# Patient Record
Sex: MALE | Race: WHITE | Employment: OTHER | URBAN - METROPOLITAN AREA
[De-identification: names, ages, dates, MRNs, and addresses within clinical notes are randomized per-mention and may not be internally consistent; named-entity substitution may affect disease eponyms.]

---

## 2017-03-09 ENCOUNTER — TELEPHONE (OUTPATIENT)
Dept: FAMILY MEDICINE | Facility: CLINIC | Age: 52
End: 2017-03-09

## 2017-03-09 ENCOUNTER — OFFICE VISIT (OUTPATIENT)
Dept: URGENT CARE | Facility: URGENT CARE | Age: 52
End: 2017-03-09
Payer: COMMERCIAL

## 2017-03-09 VITALS
TEMPERATURE: 99.7 F | WEIGHT: 194 LBS | HEART RATE: 120 BPM | BODY MASS INDEX: 26.87 KG/M2 | OXYGEN SATURATION: 97 % | SYSTOLIC BLOOD PRESSURE: 115 MMHG | DIASTOLIC BLOOD PRESSURE: 78 MMHG

## 2017-03-09 DIAGNOSIS — J11.1 INFLUENZA: Primary | ICD-10-CM

## 2017-03-09 PROCEDURE — 99213 OFFICE O/P EST LOW 20 MIN: CPT | Performed by: FAMILY MEDICINE

## 2017-03-09 NOTE — LETTER
[facility]       [address]  [place] [address]      March 9, 2017      Marlon Mackey  [address]  [facility] [place] [address]          Dear Marlon Mackey,      At [facility] we care about your health and are committed to providing quality patient care, which includes staying current on preventative cancer screenings.  You can increase your chances of finding and treating cancers through regular screenings.      Our records show that you are due for the following screening(s):    Colonoscopy for colon cancer  [facility] [phone]  Recommended every ten years for everyone age 50 and older  We strongly urge our patient's to consider having a colonoscopy done, which is the best screening test available and only needs to be done every 10 years if normal.      Other option is that you can do a FIT and this is once a year.  Any questions or concern, please contact us at [phone].    You may contact the closest location to schedule the screening test(s) at your earliest convenience.    If you have already had one or all of the above screening tests at another facility, please call us so that we may update your chart.      Sincerely,         Frank Childs MD/ ROS  [facility]
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
## 2017-03-09 NOTE — NURSING NOTE
"Chief Complaint   Patient presents with     Cough     Pt c/o Sore throat, cough (productive), headaches, low-grade fever, sweats/chills, loss of appetite, vomiting, abdominal pain, diarrhea.        Initial /78 (BP Location: Left arm, Patient Position: Chair, Cuff Size: Adult Large)  Pulse 120  Temp 99.7  F (37.6  C) (Oral)  Wt 194 lb (88 kg)  SpO2 97%  BMI 26.87 kg/m2 Estimated body mass index is 26.87 kg/(m^2) as calculated from the following:    Height as of 12/20/16: 5' 11.25\" (1.81 m).    Weight as of this encounter: 194 lb (88 kg).  Medication Reconciliation: complete     Cristina Gaming CMA (AAMA)      "

## 2017-03-09 NOTE — MR AVS SNAPSHOT
"              After Visit Summary   3/9/2017    Marlon Mackey    MRN: [id number]           Patient Information     Date Of Birth          1965        Visit Information        Provider Department      3/9/2017 11:15 AM Steve Larsen MD [facility]        Today's Diagnoses     Influenza    -  1       Follow-ups after your visit        Who to contact     If you have questions or need follow up information about today's clinic visit or your schedule please contact [facility] directly at [phone].  Normal or non-critical lab and imaging results will be communicated to you by flux - neutrinityhart, letter or phone within 4 business days after the clinic has received the results. If you do not hear from us within 7 days, please contact the clinic through Upsidet or phone. If you have a critical or abnormal lab result, we will notify you by phone as soon as possible.  Submit refill requests through Imagiin. or call your pharmacy and they will forward the refill request to us. Please allow 3 business days for your refill to be completed.          Additional Information About Your Visit        MyChart Information     [employer] lets you send messages to your doctor, view your test results, renew your prescriptions, schedule appointments and more. To sign up, go to [email or web address] . Click on \"Log in\" on the left side of the screen, which will take you to the Welcome page. Then click on \"Sign up Now\" on the right side of the page.     You will be asked to enter the access code listed below, as well as some personal information. Please follow the directions to create your username and password.     Your access code is: [email or web address]  Expires: 3/20/2017  2:48 PM     Your access code will  in 90 days. If you need help or a new code, please call your [facility] or [phone].        Care EveryWhere ID     This is your Care EveryWhere ID. This could be used by other " organizations to access your [facility] medical records  [id number]        Your Vitals Were     Pulse Temperature Pulse Oximetry BMI (Body Mass Index)          120 99.7  F (37.6  C) (Oral) 97% 26.87 kg/m2         Blood Pressure from Last 3 Encounters:   03/09/17 115/78   12/20/16 135/80   03/30/16 133/87    Weight from Last 3 Encounters:   03/09/17 194 lb (88 kg)   12/20/16 196 lb 2 oz (89 kg)   03/30/16 196 lb (88.9 kg)              Today, you had the following     No orders found for display       Primary Care Provider Office Phone # Fax #    Frank Childs [phone]       [facility] [address]  [facility] [address]        Thank you!     Thank you for choosing [facility]  for your care. Our goal is always to provide you with excellent care. Hearing back from our patients is one way we can continue to improve our services. Please take a few minutes to complete the written survey that you may receive in the mail after your visit with us. Thank you!             Your Updated Medication List - Protect others around you: Learn how to safely use, store and throw away your medicines at www.disposemymeds.org.          This list is accurate as of: 3/9/17  8:53 PM.  Always use your most recent med list.                   Brand Name Dispense Instructions for use    cetirizine 10 MG tablet    zyrTEC    30 tablet    Take 1 tablet (10 mg) by mouth At Bedtime       gabapentin 100 MG capsule    NEURONTIN     Take by mouth daily       methocarbamol 750 MG tablet    methocarbamol    30 tablet    Take 1 tablet (750 mg) by mouth 3 times daily as needed for muscle spasms       prazosin 1 MG capsule    MINIPRESS     Take by mouth daily       QUEtiapine 50 MG tablet    SEROquel     Take by mouth daily       venlafaxine 37.5 MG 24 hr capsule    EFFEXOR-XR     Take by mouth daily

## 2017-03-16 ENCOUNTER — OFFICE VISIT (OUTPATIENT)
Dept: FAMILY MEDICINE | Facility: CLINIC | Age: 52
End: 2017-03-16
Payer: COMMERCIAL

## 2017-03-16 VITALS
BODY MASS INDEX: 27.16 KG/M2 | HEIGHT: 71 IN | SYSTOLIC BLOOD PRESSURE: 115 MMHG | DIASTOLIC BLOOD PRESSURE: 76 MMHG | TEMPERATURE: 98.3 F | HEART RATE: 82 BPM | WEIGHT: 194 LBS | OXYGEN SATURATION: 96 %

## 2017-03-16 DIAGNOSIS — H61.23 BILATERAL IMPACTED CERUMEN: Primary | ICD-10-CM

## 2017-03-16 DIAGNOSIS — R68.89 FLU-LIKE SYMPTOMS: ICD-10-CM

## 2017-03-16 PROCEDURE — 99213 OFFICE O/P EST LOW 20 MIN: CPT | Performed by: PHYSICIAN ASSISTANT

## 2017-03-16 RX ORDER — GUAIFENESIN AND DEXTROMETHORPHAN HYDROBROMIDE 1200; 60 MG/1; MG/1
1 TABLET, EXTENDED RELEASE ORAL 2 TIMES DAILY
Qty: 28 TABLET | Refills: 1 | Status: SHIPPED | OUTPATIENT
Start: 2017-03-16 | End: 2018-03-06

## 2017-03-16 RX ORDER — CODEINE PHOSPHATE AND GUAIFENESIN 10; 100 MG/5ML; MG/5ML
2 SOLUTION ORAL AT BEDTIME
Qty: 236 ML | Refills: 0 | Status: SHIPPED | OUTPATIENT
Start: 2017-03-16 | End: 2018-03-06

## 2017-03-16 RX ORDER — DOXAZOSIN 1 MG/1
TABLET ORAL
COMMUNITY
Start: 2017-03-10 | End: 2018-03-22

## 2017-03-16 NOTE — PATIENT INSTRUCTIONS
Mucinex DM 1 tablet twice a day   Also may take Dayquil for cough and congestion during the day  Prescription cough syrup 10 ml at bedtime, may repeat every 4 hours throughout the night as needed     See ENT to remove ear wax  Influenza  Influenza ( the flu ) is an infection that affects your respiratory tract (the mouth, nose, and lungs, and the passages between them). Unlike a cold, the flu can make you very ill. And it can lead to pneumonia, a serious lung infection. For some people, especially older adults, young children, and people with certain chronic conditions, the flu can have serious complications and even be fatal.  What Are the Risk Factors for the Flu?     Viruses that cause influenza spread through the air in droplets when someone who has the flu coughs, sneezes, laughs, or talks.   Anyone can get the flu. But you re more likely to become infected if you:    Have a weakened immune system.    Work in a health care setting where you may be exposed to flu germs.    Live or work with someone who has the flu.    Haven t received an annual flu shot.  How Does the Flu Spread?  The flu is caused by viruses. The viruses spread through the air in droplets when someone who has the flu coughs, sneezes, laughs, or talks. You can become infected when you inhale these viruses directly. You can also become infected when you touch a surface on which the droplets have landed and then transfer the germs to your eyes, nose, or mouth. Touching used tissues, or sharing utensils, drinking glasses, or a toothbrush with an infected person can expose you to flu viruses, too.  What Are the Symptoms of the Flu?  Flu symptoms tend to come on quickly and may last a few days to a few weeks. They include:    Fever usually higher than 101 F  (38.3 C) and chills    Sore throat and headache    Dry cough    Runny nose    Tiredness and weakness    Muscle aches  Factors That Can Make Flu Worse  For some people, the flu can be very  serious. The risk of complications is greater for:    Children under age 5.    Adults 65 years of age and older.    People with a chronic illness, such as diabetes or heart, kidney, or lung disease.    People who live in a nursing home or long-term care facility.   How Is the Flu Treated?  Influenza usually improves after 7 days or so. In some cases, your health care provider may prescribe an antiviral medication. This may help you get well sooner. For the medication to help, you need to take it as soon as possible (ideally within 48 hours) after your symptoms start. If you develop pneumonia or other serious illness, hospital care may be needed.  Easing Flu Symptoms    Drink lots of fluids such as water, juice, and warm soup. A good rule is to drink enough so that you urinate your normal amount.    Get plenty of rest.    Ask your health care provider what to take for fever and pain.    Call your provider if your fever rises over 101 F (38.3 C) or you become dizzy, lightheaded, or short of breath.  Taking Steps to Protect Others    Wash your hands often, especially after coughing or sneezing. Or, clean your hands with an alcohol-based hand  containing at least 60 percent alcohol.    Cough or sneeze into a tissue. Then throw the tissue away and wash your hands. If you don t have a tissue, cough and sneeze into the crook of your elbow.    Stay home until at least 24 hours after you no longer have a fever or chills. Be sure the fever isn t being hidden by fever-reducing medication.    Don t share food, utensils, drinking glasses, or a toothbrush with others.    Ask your health care provider if others in your household should receive antiviral medication to help them avoid infection.  How Can the Flu Be Prevented?    One of the best ways to avoid the flu is to get a flu vaccination each year. Viruses that cause the flu change from year to year. For that reason, doctors recommend getting the flu vaccine each year,  as soon as it's available in your area. The vaccine may be given as a shot or as a nasal spray. Your health care provider can tell you which vaccine is right for you.    Wash your hands often. Frequent handwashing is a proven way to help prevent infection.    Carry an alcohol-based hand gel containing at least 60 percent alcohol. Use it when you don t have access to soap and water. Then wash your hands as soon as you can.    Avoid touching your eyes, nose, and mouth.    At home and work, clean phones, computer keyboards, and toys often with disinfectant wipes.    If possible, avoid close contact with others who have the flu or symptoms of the flu.  Handwashing Tips  Handwashing is one of the best ways to prevent many common infections. If you re caring for or visiting someone with the flu, wash your hands each time you enter and leave the room. Follow these steps:    Use warm water and plenty of soap. Rub your hands together well.    Clean the whole hand, under your nails, between your fingers, and up the wrists.    Wash for at least 15 seconds.    Rinse, letting the water run down your fingers, not up your wrists.    Dry your hands well. Use a paper towel to turn off the faucet and open the door.  Using Alcohol-Based Hand   Alcohol-based hand  are also a good choice. Use them when you don t have access to soap and water. Follow these steps:    Squeeze about a tablespoon of gel into the palm of one hand.    Rub your hands together briskly, cleaning the backs of your hands, the palms, between your fingers, and up the wrists.    Rub until the gel is gone and your hands are completely dry.  Preventing Influenza in Healthcare Settings  The flu is a special concern for people in hospitals and long-term care facilities. To help prevent the spread of flu, many hospitals and nursing homes take these steps:    Health care providers wash their hands or use an alcohol-based hand  before and after  treating each patient.    People with the flu have private rooms and bathrooms or share a room with someone with the same infection.    High-risk patients who don t have the flu are encouraged to get the flu and pneumonia vaccines.    All health care workers are encouraged or required to get flu shots.        8504-7572 [employer]. [address] [place] [address]. All rights reserved. This information is not intended as a substitute for professional medical care. Always follow your healthcare professional's instructions.

## 2017-03-16 NOTE — NURSING NOTE
"Chief Complaint   Patient presents with     URI       Initial /76  Pulse 82  Temp 98.3  F (36.8  C) (Oral)  Ht 5' 11.25\" (1.81 m)  Wt 194 lb (88 kg)  SpO2 96%  BMI 26.87 kg/m2 Estimated body mass index is 26.87 kg/(m^2) as calculated from the following:    Height as of this encounter: 5' 11.25\" (1.81 m).    Weight as of this encounter: 194 lb (88 kg).  Medication Reconciliation: complete     Lisbeth Lynch CMA      "

## 2017-03-16 NOTE — MR AVS SNAPSHOT
After Visit Summary   3/16/2017    Marlon Mackey    MRN: [id number]           Patient Information     Date Of Birth          1965        Visit Information        Provider Department      3/16/2017 11:20 AM Elvira Dugan PA-C [facility]        Today's Diagnoses     Bilateral impacted cerumen    -  1    Flu-like symptoms          Care Instructions    Mucinex DM 1 tablet twice a day   Also may take Dayquil for cough and congestion during the day  Prescription cough syrup 10 ml at bedtime, may repeat every 4 hours throughout the night as needed     See ENT to remove ear wax  Influenza  Influenza ( the flu ) is an infection that affects your respiratory tract (the mouth, nose, and lungs, and the passages between them). Unlike a cold, the flu can make you very ill. And it can lead to pneumonia, a serious lung infection. For some people, especially older adults, young children, and people with certain chronic conditions, the flu can have serious complications and even be fatal.  What Are the Risk Factors for the Flu?     Viruses that cause influenza spread through the air in droplets when someone who has the flu coughs, sneezes, laughs, or talks.   Anyone can get the flu. But you re more likely to become infected if you:    Have a weakened immune system.    Work in a health care setting where you may be exposed to flu germs.    Live or work with someone who has the flu.    Haven t received an annual flu shot.  How Does the Flu Spread?  The flu is caused by viruses. The viruses spread through the air in droplets when someone who has the flu coughs, sneezes, laughs, or talks. You can become infected when you inhale these viruses directly. You can also become infected when you touch a surface on which the droplets have landed and then transfer the germs to your eyes, nose, or mouth. Touching used tissues, or sharing utensils, drinking glasses, or a toothbrush with an  infected person can expose you to flu viruses, too.  What Are the Symptoms of the Flu?  Flu symptoms tend to come on quickly and may last a few days to a few weeks. They include:    Fever usually higher than 101 F  (38.3 C) and chills    Sore throat and headache    Dry cough    Runny nose    Tiredness and weakness    Muscle aches  Factors That Can Make Flu Worse  For some people, the flu can be very serious. The risk of complications is greater for:    Children under age 5.    Adults 65 years of age and older.    People with a chronic illness, such as diabetes or heart, kidney, or lung disease.    People who live in a nursing home or long-term care facility.   How Is the Flu Treated?  Influenza usually improves after 7 days or so. In some cases, your health care provider may prescribe an antiviral medication. This may help you get well sooner. For the medication to help, you need to take it as soon as possible (ideally within 48 hours) after your symptoms start. If you develop pneumonia or other serious illness, hospital care may be needed.  Easing Flu Symptoms    Drink lots of fluids such as water, juice, and warm soup. A good rule is to drink enough so that you urinate your normal amount.    Get plenty of rest.    Ask your health care provider what to take for fever and pain.    Call your provider if your fever rises over 101 F (38.3 C) or you become dizzy, lightheaded, or short of breath.  Taking Steps to Protect Others    Wash your hands often, especially after coughing or sneezing. Or, clean your hands with an alcohol-based hand  containing at least 60 percent alcohol.    Cough or sneeze into a tissue. Then throw the tissue away and wash your hands. If you don t have a tissue, cough and sneeze into the crook of your elbow.    Stay home until at least 24 hours after you no longer have a fever or chills. Be sure the fever isn t being hidden by fever-reducing medication.    Don t share food, utensils,  drinking glasses, or a toothbrush with others.    Ask your health care provider if others in your household should receive antiviral medication to help them avoid infection.  How Can the Flu Be Prevented?    One of the best ways to avoid the flu is to get a flu vaccination each year. Viruses that cause the flu change from year to year. For that reason, doctors recommend getting the flu vaccine each year, as soon as it's available in your area. The vaccine may be given as a shot or as a nasal spray. Your health care provider can tell you which vaccine is right for you.    Wash your hands often. Frequent handwashing is a proven way to help prevent infection.    Carry an alcohol-based hand gel containing at least 60 percent alcohol. Use it when you don t have access to soap and water. Then wash your hands as soon as you can.    Avoid touching your eyes, nose, and mouth.    At home and work, clean phones, computer keyboards, and toys often with disinfectant wipes.    If possible, avoid close contact with others who have the flu or symptoms of the flu.  Handwashing Tips  Handwashing is one of the best ways to prevent many common infections. If you re caring for or visiting someone with the flu, wash your hands each time you enter and leave the room. Follow these steps:    Use warm water and plenty of soap. Rub your hands together well.    Clean the whole hand, under your nails, between your fingers, and up the wrists.    Wash for at least 15 seconds.    Rinse, letting the water run down your fingers, not up your wrists.    Dry your hands well. Use a paper towel to turn off the faucet and open the door.  Using Alcohol-Based Hand   Alcohol-based hand  are also a good choice. Use them when you don t have access to soap and water. Follow these steps:    Squeeze about a tablespoon of gel into the palm of one hand.    Rub your hands together briskly, cleaning the backs of your hands, the palms, between your  fingers, and up the wrists.    Rub until the gel is gone and your hands are completely dry.  Preventing Influenza in Healthcare Settings  The flu is a special concern for people in hospitals and long-term care facilities. To help prevent the spread of flu, many hospitals and nursing homes take these steps:    Health care providers wash their hands or use an alcohol-based hand  before and after treating each patient.    People with the flu have private rooms and bathrooms or share a room with someone with the same infection.    High-risk patients who don t have the flu are encouraged to get the flu and pneumonia vaccines.    All health care workers are encouraged or required to get flu shots.        7406-7138 [employer]. [address] [place] [address], [place] [address]. All rights reserved. This information is not intended as a substitute for professional medical care. Always follow your healthcare professional's instructions.              Follow-ups after your visit        Additional Services     OTOLARYNGOLOGY REFERRAL       Your provider has referred you to: [facility]: [facility] [phone]   [email or web address]    Please be aware that coverage of these services is subject to the terms and limitations of your health insurance plan.  Call member services at your health plan with any benefit or coverage questions.      Please bring the following with you to your appointment:    (1) Any X-Rays, CTs or MRIs which have been performed.  Contact the facility where they were done to arrange for  prior to your scheduled appointment.   (2) List of current medications  (3) This referral request   (4) Any documents/labs given to you for this referral                  Who to contact     If you have questions or need follow up information about today's clinic visit or your schedule please contact [facility] directly at  "[phone].  Normal or non-critical lab and imaging results will be communicated to you by MyChart, letter or phone within 4 business days after the clinic has received the results. If you do not hear from us within 7 days, please contact the clinic through [facility] [employer] or phone. If you have a critical or abnormal lab result, we will notify you by phone as soon as possible.  Submit refill requests through [employer] or call your pharmacy and they will forward the refill request to us. Please allow 3 business days for your refill to be completed.          Additional Information About Your Visit        [employer] Information     [employer] lets you send messages to your doctor, view your test results, renew your prescriptions, schedule appointments and more. To sign up, go to [email or web address] . Click on \"Log in\" on the left side of the screen, which will take you to the Welcome page. Then click on \"Sign up Now\" on the right side of the page.     You will be asked to enter the access code listed below, as well as some personal information. Please follow the directions to create your username and password.     Your access code is: [email or web address]  Expires: 3/20/2017  3:48 PM     Your access code will  in 90 days. If you need help or a new code, please call your [facility] clinic or [phone].        Care EveryWhere ID     This is your Care EveryWhere ID. This could be used by other organizations to access your [facility] medical records  [id number]        Your Vitals Were     Pulse Temperature Height Pulse Oximetry BMI (Body Mass Index)       82 98.3  F (36.8  C) (Oral) 5' 11.25\" (1.81 m) 96% 26.87 kg/m2        Blood Pressure from Last 3 Encounters:   17 115/76   17 115/78   16 135/80    Weight from Last 3 Encounters:   17 194 lb (88 kg)   17 194 lb (88 kg)   16 196 lb 2 oz (89 kg)              We Performed the Following     OTOLARYNGOLOGY REFERRAL          Today's Medication Changes "          These changes are accurate as of: 3/16/17 12:32 PM.  If you have any questions, ask your nurse or doctor.               Start taking these medicines.        Dose/Directions    Dextromethorphan-Guaifenesin  MG Tb12   Used for:  Flu-like symptoms   Started by:  Elvira Dugan PA-C        Dose:  1 tablet   Take 1 tablet by mouth 2 times daily   Quantity:  28 tablet   Refills:  1       guaiFENesin-codeine 100-10 MG/5ML Soln solution   Commonly known as:  ROBITUSSIN AC   Used for:  Flu-like symptoms   Started by:  Elvira Dugan PA-C        Dose:  2 tsp.   Take 10 mLs by mouth At Bedtime   Quantity:  236 mL   Refills:  0            Where to get your medicines      These medications were sent to [facility] - [place], [place] - [address]  [address], [facility] [address]     Phone:  [phone]     Dextromethorphan-Guaifenesin  MG Tb12         Some of these will need a paper prescription and others can be bought over the counter.  Ask your nurse if you have questions.     Bring a paper prescription for each of these medications     guaiFENesin-codeine 100-10 MG/5ML Soln solution                Primary Care Provider Office Phone # Fax #    Frank Childs [phone]       [facility] [address]  [facility] [address]        Thank you!     Thank you for choosing [facility]  for your care. Our goal is always to provide you with excellent care. Hearing back from our patients is one way we can continue to improve our services. Please take a few minutes to complete the written survey that you may receive in the mail after your visit with us. Thank you!             Your Updated Medication List - Protect others around you: Learn how to safely use, store and throw away your medicines at www.disposemymeds.org.          This list is accurate as of: 3/16/17 12:32 PM.  Always  use your most recent med list.                   Brand Name Dispense Instructions for use    Dextromethorphan-Guaifenesin  MG Tb12     28 tablet    Take 1 tablet by mouth 2 times daily       doxazosin 1 MG tablet    CARDURA     Reported on 3/16/2017       gabapentin 100 MG capsule    NEURONTIN     Take by mouth daily       guaiFENesin-codeine 100-10 MG/5ML Soln solution    ROBITUSSIN AC    236 mL    Take 10 mLs by mouth At Bedtime       prazosin 1 MG capsule    MINIPRESS     Take by mouth daily       QUEtiapine 50 MG tablet    SEROquel     Take by mouth daily
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
## 2017-06-01 ENCOUNTER — TELEPHONE (OUTPATIENT)
Dept: FAMILY MEDICINE | Facility: CLINIC | Age: 52
End: 2017-06-01

## 2017-06-01 NOTE — TELEPHONE ENCOUNTER
Panel Management Review      BP Readings from Last 1 Encounters:   03/16/17 115/76    , No results found for: A1C, 3/16/2017    Fail List measure: Colonoscopy      Patient is due/failing the following:   COLONOSCOPY    Action needed:   Schedule colonoscopy    Type of outreach:    Patient comments documents that patient declines all healthcare maintanence outreaches via phone. Letter will be mailed.    Questions for provider review:    None                                                                                                                                    Gerald King

## 2017-06-01 NOTE — LETTER
[address] [address]  [phone]  Dept: [phone]      June 1, 2017      Marlon Mackey  [address]  [place] [address]        Dear Marlon,     At [facility] we care about your health and are committed to providing quality patient care. Which includes staying current on preventive cancer screenings.  You can increase your chances of finding and treating cancers through regular screenings.      Our records indicate you may be due for the following preventive screening(s):    Colonoscopy  Colonoscopy is recommended every ten years for everyone age 50 and older. Please take a moment to read over the enclosed information packet about colon cancer screening. We strongly urge our patient's to consider having a colonoscopy done, which is the best screening test available and only needs to be done every 10 years if normal. If you are unwilling or unable to have a colonoscopy then we recommend the annual stool testing for blood. This test is called a FIT test and it looks for blood in the stool. You can request a kit from our clinic lab department and return back at your earliest convenience.    To schedule an appointment or discuss this screening further, you may contact us by phone at the [facility] at [phone] or online through the patient portal/[email or web address] @ [email or web address]    If you have had any of the screenings listed above at another facility, please call us so that we may update your chart.      Your partners in health,        Quality Committee at [facility]

## 2017-12-11 ENCOUNTER — TRANSFERRED RECORDS (OUTPATIENT)
Dept: HEALTH INFORMATION MANAGEMENT | Facility: CLINIC | Age: 52
End: 2017-12-11

## 2017-12-11 LAB — EJECTION FRACTION: <10

## 2017-12-28 ENCOUNTER — TELEPHONE (OUTPATIENT)
Dept: FAMILY MEDICINE | Facility: CLINIC | Age: 52
End: 2017-12-28

## 2017-12-28 NOTE — LETTER
December 28, 2017      Marlon Mackey  [address]  [facility] [address]    Dear Marlon Mackey,     At [facility] we care about your health and are committed to providing quality patient care.    Which includes staying current on preventive cancer screenings.  You can increase your chances of finding and treating cancers through regular screenings.      Our records indicate you may be due for the following preventive screening(s):    Colonoscopy    Colonoscopy is recommended every ten years for everyone age 50 and older. Please take a moment to read over the enclosed information packet about colon cancer screening. We strongly urge our patient's to consider having a colonoscopy done, which is the best screening test available and only needs to be done every 10 years if normal. If you are unwilling or unable to have a colonoscopy then we recommend the annual stool testing for blood. This test is called a FIT test and it looks for blood in the stool.     To schedule an appointment for your colonoscopy, please see the attached referral.     To schedule an appointment or discuss this screening further, you may contact us by phone at the [facility] at [phone] or online through the patient portal/[email or web address] @ [email or web address]    If you have had any of the screenings listed above at another facility, please call us so that we may update your chart.      Your partners in health,      Quality Committee at [facility]

## 2017-12-28 NOTE — TELEPHONE ENCOUNTER
Panel Management Review        Last Office Visit with this department:     Fail List measure:       Patient is due/failing the following:   COLONOSCOPY    Action needed:   none    Type of outreach:    Sent letter.    Questions for provider review:    None                                                                                                                                    Regulo Turner MA

## 2018-03-06 ENCOUNTER — RADIANT APPOINTMENT (OUTPATIENT)
Dept: GENERAL RADIOLOGY | Facility: CLINIC | Age: 53
End: 2018-03-06
Attending: NURSE PRACTITIONER
Payer: COMMERCIAL

## 2018-03-06 ENCOUNTER — OFFICE VISIT (OUTPATIENT)
Dept: FAMILY MEDICINE | Facility: CLINIC | Age: 53
End: 2018-03-06
Payer: COMMERCIAL

## 2018-03-06 VITALS
BODY MASS INDEX: 26.22 KG/M2 | TEMPERATURE: 97.7 F | WEIGHT: 189.3 LBS | SYSTOLIC BLOOD PRESSURE: 116 MMHG | HEART RATE: 84 BPM | DIASTOLIC BLOOD PRESSURE: 70 MMHG | OXYGEN SATURATION: 98 %

## 2018-03-06 DIAGNOSIS — H93.8X1 EAR FULLNESS, RIGHT: ICD-10-CM

## 2018-03-06 DIAGNOSIS — M54.12 CERVICAL RADICULOPATHY: ICD-10-CM

## 2018-03-06 DIAGNOSIS — M54.12 CERVICAL RADICULOPATHY: Primary | ICD-10-CM

## 2018-03-06 PROCEDURE — 72040 X-RAY EXAM NECK SPINE 2-3 VW: CPT | Mod: FY

## 2018-03-06 PROCEDURE — 99214 OFFICE O/P EST MOD 30 MIN: CPT | Performed by: NURSE PRACTITIONER

## 2018-03-06 RX ORDER — METHYLPREDNISOLONE 4 MG
TABLET, DOSE PACK ORAL
Qty: 21 TABLET | Refills: 0 | Status: SHIPPED | OUTPATIENT
Start: 2018-03-06 | End: 2018-03-22

## 2018-03-06 RX ORDER — HYDROCODONE BITARTRATE AND ACETAMINOPHEN 5; 325 MG/1; MG/1
1 TABLET ORAL EVERY 6 HOURS PRN
Qty: 10 TABLET | Refills: 0 | Status: SHIPPED | OUTPATIENT
Start: 2018-03-06 | End: 2018-03-20

## 2018-03-06 NOTE — PATIENT INSTRUCTIONS
Start the steroid isabella today to help with inflammation  Continue the gabapentin (400 mg 3 times daily)  Take Acetaminophen/hydrocodone (Norco) 1 tab every 6-8 hours as needed for severe pain. No driving or operating machinery while taking.  No drinking alcohol while taking Acetaminophen/hydrocodone (Norco)      Understanding Cervical Radiculopathy    Cervical radiculopathy is irritation or inflammation of a nerve root in the neck. It causes neck pain and other symptoms that may spread into the chest or down the arm. To understand this condition, it helps to understand the parts of the spine:    Vertebrae. These are bones that stack to form the spine. The cervical spine contains the 7 vertebrae in the neck.    Disks. These are soft pads of tissue between the vertebrae. They act as shock absorbers for the spine.    The spinal canal. This is a tunnel formed within the stacked vertebrae. The spinal cord runs through this canal.    Nerves. These branch off the spinal cord. As they leave the spinal canal, nerves pass through openings between the vertebrae. The nerve root is the part of the nerve that is closest to the spinal cord.   With cervical radiculopathy, nerve roots in the neck become irritated. This leads to pain and symptoms that can travel to the nerves that go from the spinal cord down the arms and into the torso.  What causes cervical radiculopathy?  Aging, injury, poor posture, and other issues can lead to problems in the neck. These problems may then irritate nerve roots. These include:    Damage to a disk in the cervical spine. The damaged disk may then press on nearby nerve roots.    Degeneration from wear and tear, and aging. This can lead to narrowing (stenosis) of the openings between the vertebrae. The narrowed openings press on nerve roots as they leave the spinal canal.    An unstable spine. This is when a vertebra slips forward. It can then press on a nerve root.  There are other, less common causes  of pressure on nerves in the neck. These include infection, cysts, and tumors.  Symptoms of cervical radiculopathy  These include:    Neck pain    Pain, numbness, tingling, or weakness that travels down the arm    Loss of neck movement    Muscle spasms  Treatment for cervical radiculopathy  In most cases, your healthcare provider will first try treatments that help relieve symptoms. These may include:    Prescription or over-the-counter pain medicines. These help relieve pain and swelling.    Cold packs. These help reduce pain.    Resting. This involves avoiding positions and activities that increase pain.    Neck brace (cervical collar). This can help relieve inflammation and pain.    Physical therapy, including exercises and stretches. This can help decrease pain and increase movement and function.    Shots of medicinesaround the nerve roots. This is done to help relieve symptoms for a time.  In some cases, your healthcare provider may advise surgery to fix the underlying problem. This depends on the cause, the symptoms, and how long the pain has lasted.  Possible complications  Over time, an irritated and inflamed nerve may become damaged. This may lead to long-lasting (permanent) numbness or weakness. If symptoms change suddenly or get worse, be sure to let your healthcare provider know.     When to call your healthcare provider  Call your healthcare provider right away if you have any of these:    New pain or pain that gets worse    New or increasing weakness, numbness, or tingling in your arm or hand    Bowel or bladder changes   Date Last Reviewed: 3/10/2016    5359-1247 [employer]. [address] [place] [address], [place] [address]. All rights reserved. This information is not intended as a substitute for professional medical care. Always follow your healthcare professional's instructions.
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
## 2018-03-06 NOTE — MR AVS SNAPSHOT
After Visit Summary   3/6/2018    Marlon Mackey    MRN: [id number]           Patient Information     Date Of Birth          1965        Visit Information        Provider Department      3/6/2018 4:20 PM Allegra Arnold APRN [facility]        Today's Diagnoses     Cervical radiculopathy    -  1    Ear fullness, right          Care Instructions    Start the steroid isabella today to help with inflammation  Continue the gabapentin (400 mg 3 times daily)  Take Acetaminophen/hydrocodone (Norco) 1 tab every 6-8 hours as needed for severe pain. No driving or operating machinery while taking.      Understanding Cervical Radiculopathy    Cervical radiculopathy is irritation or inflammation of a nerve root in the neck. It causes neck pain and other symptoms that may spread into the chest or down the arm. To understand this condition, it helps to understand the parts of the spine:    Vertebrae. These are bones that stack to form the spine. The cervical spine contains the 7 vertebrae in the neck.    Disks. These are soft pads of tissue between the vertebrae. They act as shock absorbers for the spine.    The spinal canal. This is a tunnel formed within the stacked vertebrae. The spinal cord runs through this canal.    Nerves. These branch off the spinal cord. As they leave the spinal canal, nerves pass through openings between the vertebrae. The nerve root is the part of the nerve that is closest to the spinal cord.   With cervical radiculopathy, nerve roots in the neck become irritated. This leads to pain and symptoms that can travel to the nerves that go from the spinal cord down the arms and into the torso.  What causes cervical radiculopathy?  Aging, injury, poor posture, and other issues can lead to problems in the neck. These problems may then irritate nerve roots. These include:    Damage to a disk in the cervical spine. The damaged disk may then press on nearby nerve  roots.    Degeneration from wear and tear, and aging. This can lead to narrowing (stenosis) of the openings between the vertebrae. The narrowed openings press on nerve roots as they leave the spinal canal.    An unstable spine. This is when a vertebra slips forward. It can then press on a nerve root.  There are other, less common causes of pressure on nerves in the neck. These include infection, cysts, and tumors.  Symptoms of cervical radiculopathy  These include:    Neck pain    Pain, numbness, tingling, or weakness that travels down the arm    Loss of neck movement    Muscle spasms  Treatment for cervical radiculopathy  In most cases, your healthcare provider will first try treatments that help relieve symptoms. These may include:    Prescription or over-the-counter pain medicines. These help relieve pain and swelling.    Cold packs. These help reduce pain.    Resting. This involves avoiding positions and activities that increase pain.    Neck brace (cervical collar). This can help relieve inflammation and pain.    Physical therapy, including exercises and stretches. This can help decrease pain and increase movement and function.    Shots of medicinesaround the nerve roots. This is done to help relieve symptoms for a time.  In some cases, your healthcare provider may advise surgery to fix the underlying problem. This depends on the cause, the symptoms, and how long the pain has lasted.  Possible complications  Over time, an irritated and inflamed nerve may become damaged. This may lead to long-lasting (permanent) numbness or weakness. If symptoms change suddenly or get worse, be sure to let your healthcare provider know.     When to call your healthcare provider  Call your healthcare provider right away if you have any of these:    New pain or pain that gets worse    New or increasing weakness, numbness, or tingling in your arm or hand    Bowel or bladder changes   Date Last Reviewed: 3/10/2016    5329-1228 The  [employer]. [address] [place] [address]. All rights reserved. This information is not intended as a substitute for professional medical care. Always follow your healthcare professional's instructions.                Follow-ups after your visit        Who to contact     If you have questions or need follow up information about today's clinic visit or your schedule please contact [facility] directly at [phone].  Normal or non-critical lab and imaging results will be communicated to you by CarbonFlowhart, letter or phone within 4 business days after the clinic has received the results. If you do not hear from us within 7 days, please contact the clinic through ColdWattt or phone. If you have a critical or abnormal lab result, we will notify you by phone as soon as possible.  Submit refill requests through [employer] (Service[employer]) or call your pharmacy and they will forward the refill request to us. Please allow 3 business days for your refill to be completed.          Additional Information About Your Visit        CarbonFlowharWeebly Information     [employer] ([employer]) lets you send messages to your doctor, view your test results, renew your prescriptions, schedule appointments and more. To sign up, go to [email or web address] ([employer]) . Click on \"Log in\" on the left side of the screen, which will take you to the Welcome page. Then click on \"Sign up Now\" on the right side of the page.     You will be asked to enter the access code listed below, as well as some personal information. Please follow the directions to create your username and password.     Your access code is: [email or web address]  Expires: 2018  5:18 PM     Your access code will  in 90 days. If you need help or a new code, please call your [facility] or [phone].        Care EveryWhere ID     This is your Care EveryWhere ID. This could be used by other organizations to access your [place] medical records  [id number]        Your Vitals Were     Pulse " Temperature Pulse Oximetry BMI (Body Mass Index)          84 97.7  F (36.5  C) (Oral) 98% 26.22 kg/m2         Blood Pressure from Last 3 Encounters:   03/06/18 116/70   03/16/17 115/76   03/09/17 115/78    Weight from Last 3 Encounters:   03/06/18 189 lb 4.8 oz (85.9 kg)   03/16/17 194 lb (88 kg)   03/09/17 194 lb (88 kg)              We Performed the Following     REMOVE IMPACTED CERFLAQUITAN          Today's Medication Changes          These changes are accurate as of 3/6/18  5:18 PM.  If you have any questions, ask your nurse or doctor.               Start taking these medicines.        Dose/Directions    HYDROcodone-acetaminophen 5-325 MG per tablet   Commonly known as:  NORCO   Used for:  Cervical radiculopathy   Started by:  Allegra Arnold APRN CNP        Dose:  1 tablet   Take 1 tablet by mouth every 6 hours as needed for pain maximum 4 tablet(s) per day   Quantity:  10 tablet   Refills:  0       methylPREDNISolone 4 MG tablet   Commonly known as:  MEDROL DOSEPAK   Used for:  Cervical radiculopathy   Started by:  Allegra Arnold APRN CNP        Follow package instructions   Quantity:  21 tablet   Refills:  0         Stop taking these medicines if you haven't already. Please contact your care team if you have questions.     Dextromethorphan-Guaifenesin  MG Tb12   Stopped by:  Allegra Arnold APRN CNP           guaiFENesin-codeine 100-10 MG/5ML Soln solution   Commonly known as:  ROBITUSSIN AC   Stopped by:  Allegra Arnold APRN CNP                Where to get your medicines      These medications were sent to [facility] [place], [place] - [address]  [address], [facility] [address]     Phone:  [phone]     methylPREDNISolone 4 MG tablet         Some of these will need a paper prescription and others can be bought over the counter.  Ask your nurse if you have questions.     Bring a paper prescription for each of these medications      HYDROcodone-acetaminophen 5-325 MG per tablet                Primary Care Provider Office Phone # Fax #    Frank Childs [phone]       [address] [place]  [facility] [address]        Equal Access to Services     MARIBEL PARKER : Hadii chepe ku hadlauryno Soomaali, waaxda luqadaha, qaybta kaalmada adeegyada, waxulysses julian haylaynen kingsleyant alvarez toy . So [facility] [phone].    ATENCIÓN: Si habla español, tiene a anderson disposición servicios gratuitos de asistencia lingüística. Llame al [phone].    We comply with applicable federal civil rights laws and [place] laws. We do not discriminate on the basis of race, color, national origin, age, disability, sex, sexual orientation, or gender identity.            Thank you!     Thank you for choosing [facility]  for your care. Our goal is always to provide you with excellent care. Hearing back from our patients is one way we can continue to improve our services. Please take a few minutes to complete the written survey that you may receive in the mail after your visit with us. Thank you!             Your Updated Medication List - Protect others around you: Learn how to safely use, store and throw away your medicines at www.disposemymeds.org.          This list is accurate as of 3/6/18  5:18 PM.  Always use your most recent med list.                   Brand Name Dispense Instructions for use Diagnosis    doxazosin 1 MG tablet    CARDURA     Reported on 3/16/2017        gabapentin 100 MG capsule    NEURONTIN     Take 400 mg by mouth 3 times daily        HYDROcodone-acetaminophen 5-325 MG per tablet    NORCO    10 tablet    Take 1 tablet by mouth every 6 hours as needed for pain maximum 4 tablet(s) per day    Cervical radiculopathy       methylPREDNISolone 4 MG tablet    MEDROL DOSEPAK    21 tablet    Follow package instructions    Cervical radiculopathy       prazosin 1 MG capsule    MINIPRESS     Take by mouth daily        QUEtiapine  50 MG tablet    SEROquel     Take by mouth daily

## 2018-03-13 ENCOUNTER — TELEPHONE (OUTPATIENT)
Dept: FAMILY MEDICINE | Facility: CLINIC | Age: 53
End: 2018-03-13

## 2018-03-13 NOTE — TELEPHONE ENCOUNTER
Reason for Call:  Patient was in 3/6 for neck pain and x-rays.  He is still having neck pain.    Detailed comments: Please call to advise.    Phone Number Patient can be reached at: Home number on file [phone] (home)    Best Time: anytime    Can we leave a detailed message on this number? No    Thank you,    Call taken on 3/13/2018 at 9:12 AM by Venessa Spring

## 2018-03-13 NOTE — TELEPHONE ENCOUNTER
It is day 7 from appointment. Sent to the provider to advise.    Lesly Hayes RN, [facility]

## 2018-03-13 NOTE — TELEPHONE ENCOUNTER
This writer attempted to contact Marlon on 03/13/18      Reason for call inform needs to be seen and unable to leave message. Mailbox full.      If patient calls back:   Patient contacted by 1st floor [facility] Team (MA/TC). Inform patient that someone from the team will contact them, document that pt called and route to care team.         Regulo Turner MA

## 2018-03-14 NOTE — TELEPHONE ENCOUNTER
Marlon returned call    Best number to reach caller: Home number on file [phone] (home)    Is it ok to leave a detailed message: YES

## 2018-03-14 NOTE — TELEPHONE ENCOUNTER
This writer attempted to contact patient on 03/14/18      Reason for call needs to be seen, need to make appointment and unable to leave message.  Mailbox is full.      If patient calls back:   Patient contacted by 1st floor [facility] Team (MA/TC). Inform patient that someone from the team will contact them, document that pt called and route to care team.         Zaki Duarte, CMA

## 2018-03-15 NOTE — TELEPHONE ENCOUNTER
Patient contacted and informed recommends office visit for reevaluation. Patient verbalizes understanding and appointment sched with Dr Childs for next Tuesday, next appointment available.    Gerald King CMA

## 2018-03-20 ENCOUNTER — TELEPHONE (OUTPATIENT)
Dept: FAMILY MEDICINE | Facility: CLINIC | Age: 53
End: 2018-03-20

## 2018-03-20 DIAGNOSIS — M54.12 CERVICAL RADICULOPATHY: ICD-10-CM

## 2018-03-20 RX ORDER — HYDROCODONE BITARTRATE AND ACETAMINOPHEN 5; 325 MG/1; MG/1
1 TABLET ORAL EVERY 6 HOURS PRN
Qty: 10 TABLET | Refills: 0 | Status: SHIPPED | OUTPATIENT
Start: 2018-03-20 | End: 2018-03-22

## 2018-03-20 NOTE — TELEPHONE ENCOUNTER
Requested Prescriptions   Pending Prescriptions Disp Refills     HYDROcodone-acetaminophen (NORCO) 5-325 MG per tablet 10 tablet 0     Sig: Take 1 tablet by mouth every 6 hours as needed for pain maximum 4 tablet(s) per day    There is no refill protocol information for this order        Routing refill request to provider for review/approval because:  Drug not on the [facility] refill protocol     Sridhar Yun RN, BSN

## 2018-03-20 NOTE — TELEPHONE ENCOUNTER
Notify patient that I will approve refill for one time, but he requires a follow-up appointment for additional refills and to discuss definitive treatment for hs condition (recent clinic notes reviewed).

## 2018-03-20 NOTE — TELEPHONE ENCOUNTER
Reason for Call:  prescription refill for HYDROcodone-acetaminophen (NORCO) 5-325 MG per tablet    Detailed comments: Please send to  Pharmacy.    Phone Number Patient can be reached at: Home number on file [phone] (home)    Best Time: anytime    Can we leave a detailed message on this number? YES     Thank you,    Call taken on 3/20/2018 at 11:34 AM by Venessa Spring

## 2018-03-22 ENCOUNTER — RADIANT APPOINTMENT (OUTPATIENT)
Dept: GENERAL RADIOLOGY | Facility: CLINIC | Age: 53
End: 2018-03-22
Attending: INTERNAL MEDICINE
Payer: COMMERCIAL

## 2018-03-22 ENCOUNTER — OFFICE VISIT (OUTPATIENT)
Dept: FAMILY MEDICINE | Facility: CLINIC | Age: 53
End: 2018-03-22
Payer: COMMERCIAL

## 2018-03-22 VITALS
BODY MASS INDEX: 26.46 KG/M2 | TEMPERATURE: 98.1 F | SYSTOLIC BLOOD PRESSURE: 107 MMHG | OXYGEN SATURATION: 97 % | DIASTOLIC BLOOD PRESSURE: 63 MMHG | WEIGHT: 189 LBS | HEIGHT: 71 IN | HEART RATE: 100 BPM

## 2018-03-22 DIAGNOSIS — M47.812 FACET ARTHROPATHY, CERVICAL: ICD-10-CM

## 2018-03-22 DIAGNOSIS — F33.1 MODERATE RECURRENT MAJOR DEPRESSION (H): ICD-10-CM

## 2018-03-22 DIAGNOSIS — G47.01 INSOMNIA DUE TO MEDICAL CONDITION: ICD-10-CM

## 2018-03-22 DIAGNOSIS — F41.1 GAD (GENERALIZED ANXIETY DISORDER): ICD-10-CM

## 2018-03-22 DIAGNOSIS — M50.123 CERVICAL DISC DISORDER AT C6-C7 LEVEL WITH RADICULOPATHY: Primary | ICD-10-CM

## 2018-03-22 DIAGNOSIS — M54.9 ACUTE UPPER BACK PAIN: ICD-10-CM

## 2018-03-22 PROCEDURE — 72072 X-RAY EXAM THORAC SPINE 3VWS: CPT | Mod: FY

## 2018-03-22 PROCEDURE — 96372 THER/PROPH/DIAG INJ SC/IM: CPT | Performed by: INTERNAL MEDICINE

## 2018-03-22 PROCEDURE — 99214 OFFICE O/P EST MOD 30 MIN: CPT | Mod: 25 | Performed by: INTERNAL MEDICINE

## 2018-03-22 RX ORDER — HYDROCODONE BITARTRATE AND IBUPROFEN 7.5; 2 MG/1; MG/1
1 TABLET, FILM COATED ORAL 2 TIMES DAILY PRN
Qty: 30 TABLET | Refills: 0 | Status: SHIPPED | OUTPATIENT
Start: 2018-03-22 | End: 2018-07-30

## 2018-03-22 RX ORDER — METHYLPREDNISOLONE 4 MG
TABLET, DOSE PACK ORAL
Qty: 21 TABLET | Refills: 3 | Status: SHIPPED | OUTPATIENT
Start: 2018-03-22 | End: 2018-07-30

## 2018-03-22 RX ORDER — AMITRIPTYLINE HYDROCHLORIDE 50 MG/1
50-100 TABLET ORAL AT BEDTIME
Qty: 60 TABLET | Refills: 5 | Status: SHIPPED | OUTPATIENT
Start: 2018-03-22 | End: 2018-07-30

## 2018-03-22 RX ORDER — FUROSEMIDE 10 MG/ML
40 SOLUTION ORAL DAILY
COMMUNITY
End: 2018-04-24

## 2018-03-22 ASSESSMENT — ANXIETY QUESTIONNAIRES
2. NOT BEING ABLE TO STOP OR CONTROL WORRYING: MORE THAN HALF THE DAYS
GAD7 TOTAL SCORE: 16
3. WORRYING TOO MUCH ABOUT DIFFERENT THINGS: MORE THAN HALF THE DAYS
1. FEELING NERVOUS, ANXIOUS, OR ON EDGE: NEARLY EVERY DAY
IF YOU CHECKED OFF ANY PROBLEMS ON THIS QUESTIONNAIRE, HOW DIFFICULT HAVE THESE PROBLEMS MADE IT FOR YOU TO DO YOUR WORK, TAKE CARE OF THINGS AT HOME, OR GET ALONG WITH OTHER PEOPLE: EXTREMELY DIFFICULT
7. FEELING AFRAID AS IF SOMETHING AWFUL MIGHT HAPPEN: SEVERAL DAYS
6. BECOMING EASILY ANNOYED OR IRRITABLE: NEARLY EVERY DAY
5. BEING SO RESTLESS THAT IT IS HARD TO SIT STILL: MORE THAN HALF THE DAYS

## 2018-03-22 ASSESSMENT — PAIN SCALES - GENERAL: PAINLEVEL: NO PAIN (0)

## 2018-03-22 ASSESSMENT — PATIENT HEALTH QUESTIONNAIRE - PHQ9: 5. POOR APPETITE OR OVEREATING: NEARLY EVERY DAY

## 2018-03-22 NOTE — MR AVS SNAPSHOT
After Visit Summary   3/22/2018    Marlon Mackey    MRN: [id number]           Patient Information     Date Of Birth          1965        Visit Information        Provider Department      3/22/2018 2:40 PM Frank Childs MD [facility]        Today's Diagnoses     Cervical disc disorder at C6-C7 level with radiculopathy    -  1    Facet arthropathy, cervical        Acute upper back pain        Insomnia due to medical condition          Care Instructions    At [facility], we strive to deliver an exceptional experience to you, every time we see you.  If you receive a survey in the mail, please send us back your thoughts. We really do value your feedback.    Based on your medical history, these are the current health maintenance/preventive care services that you are due for (some may have been done at this visit.)  Health Maintenance Due   Topic Date Due     HEPATITIS C SCREENING  09/05/1982     COLON CANCER SCREEN (SYSTEM ASSIGNED)  09/05/2014     TETANUS IMMUNIZATION (SYSTEM ASSIGNED)  02/14/2016         Suggested websites for health information:  Www.[facility].org : Up to date and easily searchable information on multiple topics.  Www.medlineplus.gov : medication info, interactive tutorials, watch real surgeries online  Www.familydoctor.org : good info from the Academy of Family Physicians  Www.cdc.gov : public health info, travel advisories, epidemics (H1N1)  Www.aap.org : children's health info, normal development, vaccinations  Www.health[place] : [place] dept of health, public health issues in [place], N1N1    Your care team:                            Family Medicine Internal Medicine   MD Frank Barkley MD Shantel Branch-Fleming, MD Katya Georgiev PA-C Nam Ho, MD Pediatrics   CHRISTY Aranda, JEFF Combs APRN CNP   MD Hyacinth Pepper MD Deborah Mielke, MD Kim Thein, APRN CNP       Clinic hours: Monday - Thursday 7 am-7 pm; Fridays 7 am-5 pm.   Urgent care: Monday - Friday 11 am-9 pm; Saturday and Sunday 9 am-5 pm.  Pharmacy : Monday -Thursday 8 am-8 pm; Friday 8 am-6 pm; Saturday and Sunday 9 am-5 pm.     Clinic: [phone]   Pharmacy: [phone]            Follow-ups after your visit        Additional Services     ORTHOPEDICS ADULT REFERRAL       Your provider has referred you to: [facility]: [facility] [phone]   [email or web address]    Please be aware that coverage of these services is subject to the terms and limitations of your health insurance plan.  Call member services at your health plan with any benefit or coverage questions.      Please bring the following to your appointment:    >>   Any x-rays, CTs or MRIs which have been performed.  Contact the facility where they were done to arrange for  prior to your scheduled appointment.    >>   List of current medications   >>   This referral request   >>   Any documents/labs given to you for this referral            PHYSICAL THERAPY REFERRAL       *This therapy referral will be filtered to a centralized scheduling office at [facility] and the patient will receive a call to schedule an appointment at a [facility] [place] location most convenient for them. *     [facility] provides Physical Therapy evaluation and treatment and many specialty services across the [place] system.  If requesting a specialty program, please choose from the list below.    If you have not heard from the scheduling office within 2 business days, please call [phone] for all locations, with the exception of [place], please call [phone] and [facility], please call [phone]  Treatment: Evaluation & Treatment  Special Instructions/Modalities: Alleviate cervical disc  "disorder with radiculopathy and myofascial pain of both trapezius muscles.  Special Programs: None    Please be aware that coverage of these services is subject to the terms and limitations of your health insurance plan.  Call member services at your health plan with any benefit or coverage questions.      **Note to Provider:  If you are referring outside of [place] for the therapy appointment, please list the name of the location in the \"special instructions\" above, print the referral and give to the patient to schedule the appointment.                  Future tests that were ordered for you today     Open Future Orders        Priority Expected Expires Ordered    MR Cervical Spine w/o Contrast Routine  3/22/2019 3/22/2018            Who to contact     If you have questions or need follow up information about today's clinic visit or your schedule please contact [facility] directly at [phone].  Normal or non-critical lab and imaging results will be communicated to you by MyChart, letter or phone within 4 business days after the clinic has received the results. If you do not hear from us within 7 days, please contact the clinic through [employer] or phone. If you have a critical or abnormal lab result, we will notify you by phone as soon as possible.  Submit refill requests through [employer] NOW or call your pharmacy and they will forward the refill request to us. Please allow 3 business days for your refill to be completed.          Additional Information About Your Visit        S[employer] My [employer] NOW Information     [employer] lets you send messages to your doctor, view your test results, renew your prescriptions, schedule appointments and more. To sign up, go to [email or web address] . Click on \"Log in\" on the left side of the screen, which will take you to the Welcome page. Then click on \"Sign up Now\" on the right side of the page.     You will be asked to enter the access code listed below, as well as some " "personal information. Please follow the directions to create your username and password.     Your access code is: [email or web address]  Expires: 2018  6:18 PM     Your access code will  in 90 days. If you need help or a new code, please call your [place] clinic or [phone].        Care EveryWhere ID     This is your Care EveryWhere ID. This could be used by other organizations to access your [place] medical records  [id number]        Your Vitals Were     Pulse Temperature Height Pulse Oximetry BMI (Body Mass Index)       100 98.1  F (36.7  C) (Oral) 5' 10.5\" (1.791 m) 97% 26.74 kg/m2        Blood Pressure from Last 3 Encounters:   18 107/63   18 116/70   17 115/76    Weight from Last 3 Encounters:   18 189 lb (85.7 kg)   18 189 lb 4.8 oz (85.9 kg)   17 194 lb (88 kg)              We Performed the Following     METHYLPREDNISOLONE 80 MG INJ     ORTHOPEDICS ADULT REFERRAL     PHYSICAL THERAPY REFERRAL     XR Thoracic Spine 3 Views          Today's Medication Changes          These changes are accurate as of 3/22/18  3:35 PM.  If you have any questions, ask your nurse or doctor.               Start taking these medicines.        Dose/Directions    amitriptyline 50 MG tablet   Commonly known as:  ELAVIL   Used for:  Cervical disc disorder at C6-C7 level with radiculopathy, Insomnia due to medical condition   Started by:  Frank Childs MD        Dose:   mg   Take 1-2 tablets ( mg) by mouth At Bedtime   Quantity:  60 tablet   Refills:  5       HYDROcodone-ibuprofen 7.5-200 MG per tablet   Commonly known as:  VICOPROFEN   Used for:  Cervical disc disorder at C6-C7 level with radiculopathy, Facet arthropathy, cervical   Started by:  Frank Childs MD        Dose:  1 tablet   Take 1 tablet by mouth 2 times daily as needed for moderate to severe pain   Quantity:  30 tablet   Refills:  0       methylPREDNISolone 4 MG tablet   Commonly known as:  MEDROL " DOSEPAK   Used for:  Cervical disc disorder at C6-C7 level with radiculopathy, Facet arthropathy, cervical   Started by:  Frank Childs MD        Follow package instructions   Quantity:  21 tablet   Refills:  3         Stop taking these medicines if you haven't already. Please contact your care team if you have questions.     gabapentin 100 MG capsule   Commonly known as:  NEURONTIN   Stopped by:  Frank Childs MD                Where to get your medicines      These medications were sent to [facility] - [place], [place] - [address]  [address], [facility] [address]     Phone:  [phone]     amitriptyline 50 MG tablet    methylPREDNISolone 4 MG tablet         Some of these will need a paper prescription and others can be bought over the counter.  Ask your nurse if you have questions.     Bring a paper prescription for each of these medications     HYDROcodone-ibuprofen 7.5-200 MG per tablet                Primary Care Provider Office Phone # Fax #    Frank Childs [phone]       [address]  [facility] [address]        Equal Access to Services     [facility]: Hadii aad ku hadasho Soomaali, waaxda luqadaha, qaybta kaalmada adeegyada, moshe yeager . So [place] -[phone].    ATENCIÓN: Si habla español, tiene a anderson disposición servicios gratuitos de asistencia lingüística. Mary al [phone].    We comply with applicable federal civil rights laws and [place] laws. We do not discriminate on the basis of race, color, national origin, age, disability, sex, sexual orientation, or gender identity.            Thank you!     Thank you for choosing [facility]  for your care. Our goal is always to provide you with excellent care. Hearing back from our patients is one way we can continue to improve our services. Please take a few minutes to complete the written survey that you may  receive in the mail after your visit with us. Thank you!             Your Updated Medication List - Protect others around you: Learn how to safely use, store and throw away your medicines at www.disposemymeds.org.          This list is accurate as of 3/22/18  3:35 PM.  Always use your most recent med list.                   Brand Name Dispense Instructions for use Diagnosis    amitriptyline 50 MG tablet    ELAVIL    60 tablet    Take 1-2 tablets ( mg) by mouth At Bedtime    Cervical disc disorder at C6-C7 level with radiculopathy, Insomnia due to medical condition       ASPIRIN EC PO      Take 81 mg by mouth daily        ATORVASTATIN CALCIUM PO      Take 40 mg by mouth daily        furosemide 10 MG/ML solution    LASIX     Take 40 mg by mouth daily        HYDROcodone-ibuprofen 7.5-200 MG per tablet    VICOPROFEN    30 tablet    Take 1 tablet by mouth 2 times daily as needed for moderate to severe pain    Cervical disc disorder at C6-C7 level with radiculopathy, Facet arthropathy, cervical       methylPREDNISolone 4 MG tablet    MEDROL DOSEPAK    21 tablet    Follow package instructions    Cervical disc disorder at C6-C7 level with radiculopathy, Facet arthropathy, cervical       PANTOPRAZOLE SODIUM PO      Take 40 mg by mouth every morning (before breakfast)        QUEtiapine 50 MG tablet    SEROquel     Take by mouth daily        SPIRONOLACTONE PO      Take 25 mg by mouth daily        TOPROL XL PO      Take 25 mg by mouth daily

## 2018-03-22 NOTE — NURSING NOTE
Patient informed due for colonoscopy.  Patient refused colonoscopy but will like to try FIT.  Order pended.    Zaki Duarte MA
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
## 2018-03-22 NOTE — PATIENT INSTRUCTIONS
At [facility], we strive to deliver an exceptional experience to you, every time we see you.  If you receive a survey in the mail, please send us back your thoughts. We really do value your feedback.    Based on your medical history, these are the current health maintenance/preventive care services that you are due for (some may have been done at this visit.)  Health Maintenance Due   Topic Date Due     HEPATITIS C SCREENING  09/05/1982     COLON CANCER SCREEN (SYSTEM ASSIGNED)  09/05/2014     TETANUS IMMUNIZATION (SYSTEM ASSIGNED)  02/14/2016         Suggested websites for health information:  Www.CIBDO.Prelert : Up to date and easily searchable information on multiple topics.  Www.[employer]gov : medication info, interactive tutorials, watch real surgeries online  Www.familydoctor.org : good info from the Academy of Family Physicians  Www.cdc.gov : public health info, travel advisories, epidemics (H1N1)  Www.aap.org : children's health info, normal development, vaccinations  Www.health.[facility].[place].us : [place] dept of health, public health issues in [place], N1N1    Your care team:                            Family Medicine Internal Medicine   MD Frank Barkley MD Shantel Branch-Fleming, MD Katya Georgiev PA-C Nam Ho, MD Pediatrics   CHRISTY Aranda, JEFF Combs APRN CNP   MD Hyacinth Pepper MD Deborah Mielke, MD Kim Thein, APRN [facility]      Clinic hours: Monday - Thursday 7 am-7 pm; Fridays 7 am-5 pm.   Urgent care: Monday - Friday 11 am-9 pm; Saturday and Sunday 9 am-5 pm.  Pharmacy : Monday -Thursday 8 am-8 pm; Friday 8 am-6 pm; Saturday and Sunday 9 am-5 pm.     Clinic: [phone]   Pharmacy: [phone]

## 2018-03-22 NOTE — NURSING NOTE
The following medication was given:     MEDICATION: Depo Medrol 80mg  ROUTE: IM  SITE: Deltoid - Left  DOSE: 1ML  LOT #: [id number]  :  Fibersparjhon  EXPIRATION DATE:  06/2020  NDC#: 0142-6281-73    Zaki Duarte MA

## 2018-03-22 NOTE — TELEPHONE ENCOUNTER
Called, patient mailbox is full and unable to leave a msg, the care team will try again later.  Papito Stahl,  For Teams Comfort and [facility]    Our pharmacy has the script already and will start the refill process for patient. Patient is to schedule an appointment for a follow up for any further refills per Dr. Childs's note below.  Papito Stahl,  For Teams [facility]

## 2018-03-23 ASSESSMENT — ANXIETY QUESTIONNAIRES: GAD7 TOTAL SCORE: 16

## 2018-03-23 ASSESSMENT — PATIENT HEALTH QUESTIONNAIRE - PHQ9: SUM OF ALL RESPONSES TO PHQ QUESTIONS 1-9: 15

## 2018-03-23 NOTE — TELEPHONE ENCOUNTER
Called, again the mailbox is full and cannot leave a VM. Our pharmacy will contact patient when his medication is ready for .  Papito Stahl,  For Teams Comfort and Heart

## 2018-04-17 NOTE — TELEPHONE ENCOUNTER
Reason for Call:  Medication or medication refill:    Do you use a [facility]?  Name of the pharmacy and phone number for the current request:  [facility] - [phone]    Name of the medication requested: Pt calling to request to have all of his medication be sent to the [facility] in [place]    Can we leave a detailed message on this number? YES    Phone number patient can be reached at: Home number on file [phone] (home)    Best Time: anytime    Call taken on 4/17/2018 at 10:27 AM by Jose Rafael Browne

## 2018-04-17 NOTE — TELEPHONE ENCOUNTER
"Requested Prescriptions   Pending Prescriptions Disp Refills     spironolactone (ALDACTONE) 25 MG tablet    Last Written Prescription Date:  n/a  Last Fill Quantity: 0,  # refills: 0   Last Office Visit with [facility] or [facility] prescribing provider:  3/22/18   Future Office Visit:      30 tablet      Sig: Take 1 tablet (25 mg) by mouth daily    Diuretics (Including Combos) Protocol Failed    4/17/2018 10:28 AM       Failed - Normal serum creatinine on file in past 12 months    No lab results found.          Failed - Normal serum potassium on file in past 12 months    No lab results found.                Failed - Normal serum sodium on file in past 12 months    No lab results found.          Passed - Blood pressure under 140/90 in past 12 months    BP Readings from Last 3 Encounters:   03/22/18 107/63   03/06/18 116/70   03/16/17 115/76                Passed - Recent (12 mo) or future (30 days) visit within the authorizing provider's specialty    Patient had office visit in the last 12 months or has a visit in the next 30 days with authorizing provider or within the authorizing provider's specialty.  See \"Patient Info\" tab in inbasket, or \"Choose Columns\" in Meds & Orders section of the refill encounter.           Passed - Patient is age 18 or older        QUEtiapine (SEROQUEL) 50 MG tablet    Last Written Prescription Date:  n/a  Last Fill Quantity: 0,  # refills: 0   Last Office Visit with [facility], [facility] or [facility] prescribing provider:  3/22/18   Future Office Visit:      120 tablet 5     Sig: Take by mouth daily    Antipsychotic Medications Failed    4/17/2018 10:28 AM       Failed - Lipid panel on file within the past 12 months    No lab results found.           Failed - CBC on file in past 12 months    No lab results found.         Failed - A1c or Glucose on file in past 12 months    No lab results found.    Please review patients last 3 weights. If a weight gain of >10 lbs exists, you may refill the prescription " "once after instructing the patient to schedule an appointment within the next 30 days.    Wt Readings from Last 3 Encounters:   03/22/18 189 lb (85.7 kg)   03/06/18 189 lb 4.8 oz (85.9 kg)   03/16/17 194 lb (88 kg)            Passed - Blood pressure under 140/90 in past 12 months    BP Readings from Last 3 Encounters:   03/22/18 107/63   03/06/18 116/70   03/16/17 115/76                Passed - Patient is 12 years of age or older       Passed - Heart Rate on file within past 12 months    Pulse Readings from Last 3 Encounters:   03/22/18 100   03/06/18 84   03/16/17 82              Passed - Recent (6 mo) or future (30 days) visit within the authorizing provider's specialty    Patient had office visit in the last 6 months or has a visit in the next 30 days with authorizing provider or within the authorizing provider's specialty.  See \"Patient Info\" tab in inbasket, or \"Choose Columns\" in Meds & Orders section of the refill encounter.            pantoprazole (PROTONIX) 20 MG EC tablet    Last Written Prescription Date:  n/a  Last Fill Quantity: 0,  # refills: 0   Last Office Visit with [facility], [facility] or [facility] prescribing provider:  3/22/18   Future Office Visit:      30 tablet      Sig: Take 2 tablets (40 mg) by mouth every morning (before breakfast)    PPI Protocol Passed    4/17/2018 10:28 AM       Passed - Not on Clopidogrel (unless Pantoprazole ordered)       Passed - No diagnosis of osteoporosis on record       Passed - Recent (12 mo) or future (30 days) visit within the authorizing provider's specialty    Patient had office visit in the last 12 months or has a visit in the next 30 days with authorizing provider or within the authorizing provider's specialty.  See \"Patient Info\" tab in inbasket, or \"Choose Columns\" in Meds & Orders section of the refill encounter.           Passed - Patient is age 18 or older        metoprolol succinate (TOPROL XL) 25 MG 24 hr tablet    Last Written Prescription Date:  n/a  Last Fill " "Quantity: 0,  # refills: 0   Last Office Visit with [facility], [facility] or [facility] prescribing provider:  3/22/18   Future Office Visit:      30 tablet      Sig: Take 1 tablet (25 mg) by mouth daily    Beta-Blockers Protocol Passed    4/17/2018 10:28 AM       Passed - Blood pressure under 140/90 in past 12 months    BP Readings from Last 3 Encounters:   03/22/18 107/63   03/06/18 116/70   03/16/17 115/76                Passed - Patient is age 6 or older       Passed - Recent (12 mo) or future (30 days) visit within the authorizing provider's specialty    Patient had office visit in the last 12 months or has a visit in the next 30 days with authorizing provider or within the authorizing provider's specialty.  See \"Patient Info\" tab in inbasket, or \"Choose Columns\" in Meds & Orders section of the refill encounter.            methylPREDNISolone (MEDROL DOSEPAK) 4 MG tablet    Last Written Prescription Date:  3/22/18  Last Fill Quantity: 21,  # refills: 3   Last Office Visit with [facility], [facility] or [facility] prescribing provider:  3/22/18   Future Office Visit:      21 tablet 3     Sig: Follow package instructions    There is no refill protocol information for this order        furosemide (LASIX) 10 MG/ML solution 60 mL      Sig: Take 4 mLs (40 mg) by mouth daily    Diuretics (Including Combos) Protocol Failed    4/17/2018 10:28 AM       Failed - Normal serum creatinine on file in past 12 months    No lab results found.          Failed - Normal serum potassium on file in past 12 months    No lab results found.                Failed - Normal serum sodium on file in past 12 months    No lab results found.          Passed - Blood pressure under 140/90 in past 12 months    BP Readings from Last 3 Encounters:   03/22/18 107/63   03/06/18 116/70   03/16/17 115/76                Passed - Recent (12 mo) or future (30 days) visit within the authorizing provider's specialty    Patient had office visit in the last 12 months or has a visit in the " "next 30 days with authorizing provider or within the authorizing provider's specialty.  See \"Patient Info\" tab in [employer], or \"Choose Columns\" in Meds & Orders section of the refill encounter.           Passed - Patient is age 18 or older        atorvastatin (LIPITOR) 10 MG tablet    Last Written Prescription Date:  n/a  Last Fill Quantity: 0,  # refills: 0   Last Office Visit with [facility], [facility] or [facility] prescribing provider:  3/22/18   Future Office Visit:      30 tablet      Sig: Take 4 tablets (40 mg) by mouth daily    Statins Protocol Failed    4/17/2018 10:28 AM       Failed - LDL on file in past 12 months    No lab results found.         Passed - No abnormal creatine kinase in past 12 months    No lab results found.            Passed - Recent (12 mo) or future (30 days) visit within the authorizing provider's specialty    Patient had office visit in the last 12 months or has a visit in the next 30 days with authorizing provider or within the authorizing provider's specialty.  See \"Patient Info\" tab in [employer], or \"Choose Columns\" in Meds & Orders section of the refill encounter.           Passed - Patient is age 18 or older        aspirin EC 81 MG EC tablet    Last Written Prescription Date:  n/a  Last Fill Quantity: 0,  # refills: 0   Last Office Visit with [facility] or [facility] prescribing provider:  3/22/18   Future Office Visit:            Sig: Take 1 tablet (81 mg) by mouth daily    Analgesics (Non-Narcotic Tylenol and ASA Only) Passed    4/17/2018 10:28 AM       Passed - Recent (12 mo) or future (30 days) visit within the authorizing provider's specialty    Patient had office visit in the last 12 months or has a visit in the next 30 days with authorizing provider or within the authorizing provider's specialty.  See \"Patient Info\" tab in [employer], or \"Choose Columns\" in Meds & Orders section of the refill encounter.           Passed - Patient is age 20 years or older    If ASA is flagged for ages under 20 " "years old. Forward to provider for confirmation Ryes Syndrome is not a concern.              amitriptyline (ELAVIL) 50 MG tablet    Last Written Prescription Date:  3/22/18  Last Fill Quantity: 60,  # refills: 5   Last Office Visit with [facility] or [facility] prescribing provider:  3/22/18   Future Office Visit:    60 tablet 5     Sig: Take 1-2 tablets ( mg) by mouth At Bedtime    Tricyclic Agents ( Annual appt and no PHQ9) Passed    4/17/2018 10:28 AM       Passed - Blood Pressure under 140/90 in past 12 mos    BP Readings from Last 3 Encounters:   03/22/18 107/63   03/06/18 116/70   03/16/17 115/76                Passed - Recent (12 mo) or future (30 days) visit within authorizing provider's specialty    Patient had office visit in the last 12 months or has a visit in the next 30 days with authorizing provider or within the authorizing provider's specialty.  See \"Patient Info\" tab in inbasket, or \"Choose Columns\" in Meds & Orders section of the refill encounter.           Passed - Patient is age 18 or older              Satish Faarax  [facility]  "

## 2018-04-18 RX ORDER — SPIRONOLACTONE 25 MG/1
25 TABLET ORAL DAILY
Qty: 30 TABLET | Status: CANCELLED | OUTPATIENT
Start: 2018-04-18

## 2018-04-18 RX ORDER — METOPROLOL SUCCINATE 25 MG/1
25 TABLET, EXTENDED RELEASE ORAL DAILY
Qty: 30 TABLET | Status: CANCELLED | OUTPATIENT
Start: 2018-04-18

## 2018-04-18 RX ORDER — METHYLPREDNISOLONE 4 MG
TABLET, DOSE PACK ORAL
Qty: 21 TABLET | Refills: 3 | Status: CANCELLED | OUTPATIENT
Start: 2018-04-18

## 2018-04-18 RX ORDER — ASPIRIN 81 MG/1
81 TABLET ORAL DAILY
Status: CANCELLED | OUTPATIENT
Start: 2018-04-18

## 2018-04-18 RX ORDER — QUETIAPINE FUMARATE 50 MG/1
TABLET, FILM COATED ORAL DAILY
Qty: 120 TABLET | Refills: 5 | Status: CANCELLED | OUTPATIENT
Start: 2018-04-18

## 2018-04-18 RX ORDER — PANTOPRAZOLE SODIUM 20 MG/1
40 TABLET, DELAYED RELEASE ORAL
Qty: 30 TABLET | Status: CANCELLED | OUTPATIENT
Start: 2018-04-18

## 2018-04-18 RX ORDER — ATORVASTATIN CALCIUM 10 MG/1
40 TABLET, FILM COATED ORAL DAILY
Qty: 30 TABLET | Status: CANCELLED | OUTPATIENT
Start: 2018-04-18

## 2018-04-18 RX ORDER — AMITRIPTYLINE HYDROCHLORIDE 50 MG/1
50-100 TABLET ORAL AT BEDTIME
Qty: 60 TABLET | Refills: 5 | Status: CANCELLED | OUTPATIENT
Start: 2018-04-18

## 2018-04-18 RX ORDER — FUROSEMIDE 10 MG/ML
40 SOLUTION ORAL DAILY
Qty: 60 ML | Status: CANCELLED | OUTPATIENT
Start: 2018-04-18

## 2018-04-19 NOTE — TELEPHONE ENCOUNTER
methylPREDNISolone (MEDROL DOSEPAK) and amitriptyline (ELAVIL) were sent to the [place] pharmacy with refills on 3/22/18 with refills. MA to please call the patient and instruct him on the process of pharmacy to pharmacy transfer of medication.    Provider to review the rest of the medications.  Routing refill request to provider for review/approval because:  Medication is reported/historical.    Lesly Hayes RN, [facility] Triage

## 2018-04-20 NOTE — TELEPHONE ENCOUNTER
Called informed patient pharmacy transfer. Appointment scheduled for patient 4/25/18 10:40 with PCP.    MARYANN Turner MA

## 2018-04-24 DIAGNOSIS — M50.123 CERVICAL DISC DISORDER AT C6-C7 LEVEL WITH RADICULOPATHY: ICD-10-CM

## 2018-04-24 DIAGNOSIS — M47.812 FACET ARTHROPATHY, CERVICAL: ICD-10-CM

## 2018-04-24 DIAGNOSIS — I10 ESSENTIAL HYPERTENSION: ICD-10-CM

## 2018-04-24 DIAGNOSIS — E78.5 HYPERLIPIDEMIA LDL GOAL <100: Primary | ICD-10-CM

## 2018-04-24 NOTE — TELEPHONE ENCOUNTER
"Requested Prescriptions   Pending Prescriptions Disp Refills     spironolactone (ALDACTONE) 25 MG tablet    Last Written Prescription Date:  n/a  Last Fill Quantity: 0,  # refills: 0   [facility] or [facility] prescribing provider:  3/22/18   Future Office Visit:    Next 5 appointments (look out 90 days)     Apr 25, 2018 10:40 AM CDT   [facility]   [facility]    [address] [facility] [facility] [address] [phone]                  30 tablet      Sig: Take 1 tablet (25 mg) by mouth daily    Diuretics (Including Combos) Protocol Failed    4/24/2018  9:26 AM       Failed - Normal serum creatinine on file in past 12 months    No lab results found.          Failed - Normal serum potassium on file in past 12 months    No lab results found.                Failed - Normal serum sodium on file in past 12 months    No lab results found.          Passed - Blood pressure under 140/90 in past 12 months    BP Readings from Last 3 Encounters:   03/22/18 107/63   03/06/18 116/70   03/16/17 115/76                Passed - Recent (12 mo) or future (30 days) visit within the authorizing provider's specialty    Patient had office visit in the last 12 months or has a visit in the next 30 days with authorizing provider or within the authorizing provider's specialty.  See \"Patient Info\" tab in inbasket, or \"Choose Columns\" in Meds & Orders section of the refill encounter.           Passed - Patient is age 18 or older        furosemide (LASIX) 10 MG/ML solution    Last Written Prescription Date:  n/a  Last Fill Quantity: 0,  # refills: 0   Last Office Visit with [facility], [facility] or [facility] prescribing provider:  3/22/18   Future Office Visit:    Next 5 appointments (look out 90 days)     Apr 25, 2018 10:40 AM CDT   Office Visit with Frank Childs MD   [facility]    [address] [address]   [phone]                  60 mL      Sig: Take 4 mLs (40 mg) by mouth daily    Diuretics (Including Combos) Protocol Failed    4/24/2018  9:26 AM       Failed - Normal serum creatinine on file in past 12 months    No lab results found.          Failed - Normal serum potassium on file in past 12 months    No lab results found.                Failed - Normal serum sodium on file in past 12 months    No lab results found.          Passed - Blood pressure under 140/90 in past 12 months    BP Readings from Last 3 Encounters:   03/22/18 107/63   03/06/18 116/70   03/16/17 115/76                Passed - Recent (12 mo) or future (30 days) visit within the authorizing provider's specialty    Patient had office visit in the last 12 months or has a visit in the next 30 days with authorizing provider or within the authorizing provider's specialty.  See \"Patient Info\" tab in inbasket, or \"Choose Columns\" in Meds & Orders section of the refill encounter.           Passed - Patient is age 18 or older        metoprolol succinate (TOPROL XL) 25 MG 24 hr tablet    Last Written Prescription Date:  n/a  Last Fill Quantity: 0,  # refills: 0   Last [facility] Visit with [facility] or [facility] prescribing provider:  3/22/18   Future Office Visit:    Next 5 appointments (look out 90 days)     Apr 25, 2018 10:40 AM CDT   Office Visit with Frank Childs MD   [facility]    [address] [facility] [address]   [phone]                  30 tablet      Sig: Take 1 tablet (25 mg) by mouth daily    Beta-Blockers Protocol Passed    4/24/2018  9:26 AM       Passed - Blood pressure under 140/90 in past 12 months    BP Readings from Last 3 Encounters:   03/22/18 107/63   03/06/18 116/70   03/16/17 115/76                Passed - Patient is age 6 or older       Passed - Recent (12 mo) or future (30 days) visit within the " "authorizing provider's specialty    Patient had office visit in the last 12 months or has a visit in the next 30 days with authorizing provider or within the authorizing provider's specialty.  See \"Patient Info\" tab in inbasket, or \"Choose Columns\" in Meds & Orders section of the refill encounter.            atorvastatin (LIPITOR) 40 MG tablet    Last Written Prescription Date:  n/a  Last Fill Quantity: 0,  # refills: 0   Last Office Visit with [facility], [facility] or  [facility] prescribing provider:  3/22/18   Future Office Visit:    Next 5 appointments (look out 90 days)     Apr 25, 2018 10:40 AM CDT   Office Visit with [facility]   [facility]    [address] [place] [address] [phone]                  30 tablet      Sig: Take 1 tablet (40 mg) by mouth daily    Statins Protocol Failed    4/24/2018  9:26 AM       Failed - LDL on file in past 12 months    No lab results found.         Passed - No abnormal creatine kinase in past 12 months    No lab results found.            Passed - Recent (12 mo) or future (30 days) visit within the authorizing provider's specialty    Patient had office visit in the last 12 months or has a visit in the next 30 days with authorizing provider or within the authorizing provider's specialty.  See \"Patient Info\" tab in inbasket, or \"Choose Columns\" in Meds & Orders section of the refill encounter.           Passed - Patient is age 18 or older        HYDROcodone-ibuprofen (VICOPROFEN) 7.5-200 MG per tablet 30 tablet 0     Sig: Take 1 tablet by mouth 2 times daily as needed for moderate to severe pain    There is no refill protocol information for this order        aspirin EC 81 MG EC tablet    Last Written Prescription Date:  n/a  Last Fill Quantity: 0,  # refills: 0   Last Office Visit with [facility], [facility] or [facility] prescribing provider:  3/22/18   Future Office Visit:    Next 5 appointments (look out 90 days)  " "   Apr 25, 2018 10:40 AM CDT   [facility]   [facility]    [address] [place] [address] [phone]                        Sig: Take 1 tablet (81 mg) by mouth daily    Analgesics (Non-Narcotic Tylenol and ASA Only) Passed    4/24/2018  9:26 AM       Passed - Recent (12 mo) or future (30 days) visit within the authorizing provider's specialty    Patient had office visit in the last 12 months or has a visit in the next 30 days with authorizing provider or within the authorizing provider's specialty.  See \"Patient Info\" tab in inbasket, or \"Choose Columns\" in Meds & Orders section of the refill encounter.           Passed - Patient is age 20 years or older    If ASA is flagged for ages under 20 years old. Forward to provider for confirmation Ryes Syndrome is not a concern.                  Satish Faarax  [facility]    "

## 2018-04-25 RX ORDER — SPIRONOLACTONE 25 MG/1
25 TABLET ORAL DAILY
Qty: 30 TABLET | Refills: 0 | Status: SHIPPED | OUTPATIENT
Start: 2018-04-25 | End: 2018-07-30

## 2018-04-25 RX ORDER — FUROSEMIDE 10 MG/ML
40 SOLUTION ORAL DAILY
Qty: 60 ML | Refills: 0 | Status: SHIPPED | OUTPATIENT
Start: 2018-04-25 | End: 2018-07-30

## 2018-04-25 RX ORDER — ASPIRIN 81 MG/1
81 TABLET ORAL DAILY
Qty: 30 TABLET | Refills: 0 | Status: SHIPPED | OUTPATIENT
Start: 2018-04-25 | End: 2018-12-26

## 2018-04-25 RX ORDER — ATORVASTATIN CALCIUM 40 MG/1
40 TABLET, FILM COATED ORAL DAILY
Qty: 30 TABLET | Refills: 0 | Status: SHIPPED | OUTPATIENT
Start: 2018-04-25 | End: 2018-12-26

## 2018-04-25 RX ORDER — HYDROCODONE BITARTRATE AND IBUPROFEN 7.5; 2 MG/1; MG/1
1 TABLET, FILM COATED ORAL 2 TIMES DAILY PRN
Qty: 30 TABLET | Refills: 0 | OUTPATIENT
Start: 2018-04-25

## 2018-04-25 RX ORDER — METOPROLOL SUCCINATE 25 MG/1
25 TABLET, EXTENDED RELEASE ORAL DAILY
Qty: 30 TABLET | Refills: 0 | Status: SHIPPED | OUTPATIENT
Start: 2018-04-25 | End: 2018-07-30

## 2018-04-25 NOTE — TELEPHONE ENCOUNTER
Routing refill request to provider for review/approval because:  Labs not current:  Creatinine, potassium, sodium, LDL    Sridhar Yun RN, BSN

## 2018-06-07 ENCOUNTER — OFFICE VISIT (OUTPATIENT)
Dept: FAMILY MEDICINE | Facility: CLINIC | Age: 53
End: 2018-06-07
Payer: COMMERCIAL

## 2018-06-07 VITALS
OXYGEN SATURATION: 99 % | TEMPERATURE: 98.2 F | DIASTOLIC BLOOD PRESSURE: 79 MMHG | BODY MASS INDEX: 24.75 KG/M2 | HEART RATE: 101 BPM | WEIGHT: 175 LBS | SYSTOLIC BLOOD PRESSURE: 113 MMHG

## 2018-06-07 DIAGNOSIS — E78.5 HYPERLIPIDEMIA LDL GOAL <70: ICD-10-CM

## 2018-06-07 DIAGNOSIS — Z01.818 PREOP GENERAL PHYSICAL EXAM: Primary | ICD-10-CM

## 2018-06-07 DIAGNOSIS — Z23 NEED FOR PROPHYLACTIC VACCINATION WITH TETANUS-DIPHTHERIA (TD): ICD-10-CM

## 2018-06-07 LAB
ANION GAP SERPL CALCULATED.3IONS-SCNC: 10 MMOL/L (ref 3–14)
BASOPHILS # BLD AUTO: 0.1 10E9/L (ref 0–0.2)
BASOPHILS NFR BLD AUTO: 1.6 %
BUN SERPL-MCNC: 10 MG/DL (ref 7–30)
CALCIUM SERPL-MCNC: 8.6 MG/DL (ref 8.5–10.1)
CHLORIDE SERPL-SCNC: 98 MMOL/L (ref 94–109)
CHOLEST SERPL-MCNC: 149 MG/DL
CHOLEST SERPL-MCNC: 151 MG/DL
CO2 SERPL-SCNC: 28 MMOL/L (ref 20–32)
CREAT SERPL-MCNC: 0.77 MG/DL (ref 0.66–1.25)
DIFFERENTIAL METHOD BLD: ABNORMAL
EOSINOPHIL # BLD AUTO: 0 10E9/L (ref 0–0.7)
EOSINOPHIL NFR BLD AUTO: 0.6 %
ERYTHROCYTE [DISTWIDTH] IN BLOOD BY AUTOMATED COUNT: 12 % (ref 10–15)
GFR SERPL CREATININE-BSD FRML MDRD: >90 ML/MIN/1.7M2
GLUCOSE SERPL-MCNC: 82 MG/DL (ref 70–99)
HCT VFR BLD AUTO: 40.1 % (ref 40–53)
HDLC SERPL-MCNC: 51 MG/DL
HDLC SERPL-MCNC: 53 MG/DL
HGB BLD-MCNC: 13.6 G/DL (ref 13.3–17.7)
INR PPP: 0.9 (ref 0.86–1.14)
LDLC SERPL CALC-MCNC: 69 MG/DL
LDLC SERPL CALC-MCNC: 73 MG/DL
LYMPHOCYTES # BLD AUTO: 1.3 10E9/L (ref 0.8–5.3)
LYMPHOCYTES NFR BLD AUTO: 21.3 %
MCH RBC QN AUTO: 36.2 PG (ref 26.5–33)
MCHC RBC AUTO-ENTMCNC: 33.9 G/DL (ref 31.5–36.5)
MCV RBC AUTO: 107 FL (ref 78–100)
MONOCYTES # BLD AUTO: 1 10E9/L (ref 0–1.3)
MONOCYTES NFR BLD AUTO: 15.1 %
NEUTROPHILS # BLD AUTO: 3.9 10E9/L (ref 1.6–8.3)
NEUTROPHILS NFR BLD AUTO: 61.4 %
NONHDLC SERPL-MCNC: 100 MG/DL
NONHDLC SERPL-MCNC: 96 MG/DL
PLATELET # BLD AUTO: 272 10E9/L (ref 150–450)
POTASSIUM SERPL-SCNC: 4.2 MMOL/L (ref 3.4–5.3)
RBC # BLD AUTO: 3.76 10E12/L (ref 4.4–5.9)
SODIUM SERPL-SCNC: 136 MMOL/L (ref 133–144)
TRIGL SERPL-MCNC: 135 MG/DL
TRIGL SERPL-MCNC: 136 MG/DL
WBC # BLD AUTO: 6.3 10E9/L (ref 4–11)

## 2018-06-07 PROCEDURE — 99215 OFFICE O/P EST HI 40 MIN: CPT | Mod: 25 | Performed by: INTERNAL MEDICINE

## 2018-06-07 PROCEDURE — 93000 ELECTROCARDIOGRAM COMPLETE: CPT | Performed by: INTERNAL MEDICINE

## 2018-06-07 PROCEDURE — 85610 PROTHROMBIN TIME: CPT | Performed by: INTERNAL MEDICINE

## 2018-06-07 PROCEDURE — 80048 BASIC METABOLIC PNL TOTAL CA: CPT | Performed by: INTERNAL MEDICINE

## 2018-06-07 PROCEDURE — 90715 TDAP VACCINE 7 YRS/> IM: CPT | Performed by: INTERNAL MEDICINE

## 2018-06-07 PROCEDURE — 80061 LIPID PANEL: CPT | Performed by: INTERNAL MEDICINE

## 2018-06-07 PROCEDURE — 90471 IMMUNIZATION ADMIN: CPT | Performed by: INTERNAL MEDICINE

## 2018-06-07 PROCEDURE — 85025 COMPLETE CBC W/AUTO DIFF WBC: CPT | Performed by: INTERNAL MEDICINE

## 2018-06-07 PROCEDURE — 36415 COLL VENOUS BLD VENIPUNCTURE: CPT | Performed by: INTERNAL MEDICINE

## 2018-06-07 ASSESSMENT — PAIN SCALES - GENERAL: PAINLEVEL: NO PAIN (0)
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
## 2018-06-07 NOTE — PATIENT INSTRUCTIONS
At [facility], we strive to deliver an exceptional experience to you, every time we see you.  If you receive a survey in the mail, please send us back your thoughts. We really do value your feedback.    Based on your medical history, these are the current health maintenance/preventive care services that you are due for (some may have been done at this visit.)  Health Maintenance Due   Topic Date Due     BMP Q1 YR  09/05/1965     LIPID MONITORING Q1 YEAR  09/05/1965     DEPRESSION ACTION PLAN Q1 YR  09/05/1982     HIV SCREEN (SYSTEM ASSIGNED)  09/05/1982     HEPATITIS C SCREENING  09/05/1982     COLON CANCER SCREEN (SYSTEM ASSIGNED)  09/05/2014     TETANUS IMMUNIZATION (SYSTEM ASSIGNED)  02/14/2016       Suggested websites for health information:  Www.[employer] : Up to date and easily searchable information on multiple topics.  Www.[employer].gov : medication info, interactive tutorials, watch real surgeries online  Www.familydoctor.org : good info from the Academy of Family Physicians  Www.cdc.gov : public health info, travel advisories, epidemics (H1N1)  Www.aap.org : children's health info, normal development, vaccinations  Www.health.[facility].us : [place] dept of health, public health issues in [place], N1N1    Your care team:                            Family Medicine Internal Medicine   MD Frank Barkley MD Shantel Branch-Fleming, MD Katya Georgiev PA-C Megan Hill, APRN JEFF Quintanilla MD Pediatrics   Trino Coy, PANESSA Barnes, MD Gypsy Ulloa APRN CNP   MD Hyacinth Pepper MD Deborah Mielke, MD Kim Thein, APRN [facility]      Clinic hours: Monday - Thursday 7 am-7 pm; Fridays 7 am-5 pm.   Urgent care: Monday - Friday 11 am-9 pm; Saturday and Sunday 9 am-5 pm.  Pharmacy : Monday -Thursday 8 am-8 pm; Friday 8 am-6 pm; Saturday and Sunday 9 am-5 pm.     Clinic: [phone]   Pharmacy: [phone]        Before Your  Surgery      Call your surgeon if there is any change in your health. This includes signs of a cold or flu (such as a sore throat, runny nose, cough, rash or fever).    Do not smoke, drink alcohol or take over the counter medicine (unless your surgeon or primary care doctor tells you to) for the 24 hours before and after surgery.    If you take prescribed drugs: Follow your doctor s orders about which medicines to take and which to stop until after surgery.    Eating and drinking prior to surgery: follow the instructions from your surgeon    Take a shower or bath the night before surgery. Use the soap your surgeon gave you to gently clean your skin. If you do not have soap from your surgeon, use your regular soap. Do not shave or scrub the surgery site.  Wear clean pajamas and have clean sheets on your bed.
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
## 2018-07-10 ENCOUNTER — TRANSFERRED RECORDS (OUTPATIENT)
Dept: HEALTH INFORMATION MANAGEMENT | Facility: CLINIC | Age: 53
End: 2018-07-10

## 2018-07-16 ENCOUNTER — TELEPHONE (OUTPATIENT)
Dept: FAMILY MEDICINE | Facility: CLINIC | Age: 53
End: 2018-07-16

## 2018-07-16 NOTE — TELEPHONE ENCOUNTER
...Reason for Call:   prescription    Detailed comments: Patient said he would like a script for ringing in his head.    Phone Number Patient can be reached at: Home number on file [phone] (home)    Best Time: anytime    Can we leave a detailed message on this number? YES    Call taken on 7/16/2018 at 9:34 AM by Dane Perez

## 2018-07-16 NOTE — TELEPHONE ENCOUNTER
Spoke to the patient and scheduled a office visit with Dr Childs on 7/24/18.  Samira Johnson MA/  For [employer] [place]

## 2018-07-16 NOTE — TELEPHONE ENCOUNTER
Patient was only seen for preoperative evaluation prior to coronary angiography last 6/7/2018.    He requires a clinic appointment for this request.

## 2018-07-30 ENCOUNTER — OFFICE VISIT (OUTPATIENT)
Dept: FAMILY MEDICINE | Facility: CLINIC | Age: 53
End: 2018-07-30
Payer: COMMERCIAL

## 2018-07-30 VITALS
RESPIRATION RATE: 16 BRPM | TEMPERATURE: 97.6 F | DIASTOLIC BLOOD PRESSURE: 77 MMHG | BODY MASS INDEX: 26.74 KG/M2 | SYSTOLIC BLOOD PRESSURE: 106 MMHG | WEIGHT: 189 LBS | OXYGEN SATURATION: 97 % | HEART RATE: 111 BPM

## 2018-07-30 DIAGNOSIS — K64.9 HEMORRHOIDS, UNSPECIFIED HEMORRHOID TYPE: ICD-10-CM

## 2018-07-30 DIAGNOSIS — H93.13 TINNITUS, BILATERAL: ICD-10-CM

## 2018-07-30 DIAGNOSIS — H61.23 BILATERAL IMPACTED CERUMEN: ICD-10-CM

## 2018-07-30 DIAGNOSIS — I50.22 CHRONIC SYSTOLIC HEART FAILURE (H): ICD-10-CM

## 2018-07-30 DIAGNOSIS — Z78.9 IMPAIRED MOBILITY AND ADLS: ICD-10-CM

## 2018-07-30 DIAGNOSIS — I42.6 CARDIOMYOPATHY, ALCOHOLIC (H): Primary | ICD-10-CM

## 2018-07-30 DIAGNOSIS — Z74.09 IMPAIRED MOBILITY AND ADLS: ICD-10-CM

## 2018-07-30 PROCEDURE — 99214 OFFICE O/P EST MOD 30 MIN: CPT | Performed by: INTERNAL MEDICINE

## 2018-07-30 RX ORDER — BUMETANIDE 1 MG/1
1 TABLET ORAL 2 TIMES DAILY
Qty: 60 TABLET | Refills: 5 | Status: SHIPPED | OUTPATIENT
Start: 2018-07-30 | End: 2018-12-04

## 2018-07-30 RX ORDER — CARVEDILOL 3.12 MG/1
3.12 TABLET ORAL 2 TIMES DAILY WITH MEALS
Qty: 60 TABLET | Refills: 5 | Status: SHIPPED | OUTPATIENT
Start: 2018-07-30 | End: 2018-07-30

## 2018-07-30 RX ORDER — RAMIPRIL 2.5 MG/1
2.5 CAPSULE ORAL
Qty: 60 CAPSULE | Refills: 5 | Status: SHIPPED | OUTPATIENT
Start: 2018-07-30 | End: 2018-12-04

## 2018-07-30 RX ORDER — CARVEDILOL 6.25 MG/1
6.25 TABLET ORAL 2 TIMES DAILY WITH MEALS
Qty: 60 TABLET | Refills: 5 | Status: SHIPPED | OUTPATIENT
Start: 2018-07-30 | End: 2018-07-30

## 2018-07-30 RX ORDER — CARVEDILOL 3.12 MG/1
3.12 TABLET ORAL 2 TIMES DAILY WITH MEALS
Qty: 60 TABLET | Refills: 5 | Status: SHIPPED | OUTPATIENT
Start: 2018-07-30 | End: 2018-12-04

## 2018-07-30 NOTE — MR AVS SNAPSHOT
After Visit Summary   7/30/2018    Marlon Mackey    MRN: [id number]           Patient Information     Date Of Birth          1965        Visit Information        Provider Department      7/30/2018 12:40 PM Frank Childs MD [facility]        Today's Diagnoses     Cardiomyopathy, alcoholic (H)    -  1    Bilateral impacted cerumen        Tinnitus, bilateral        Impaired mobility and ADLs        Hemorrhoids, unspecified hemorrhoid type           Follow-ups after your visit        Additional Services     GENERAL SURG ADULT REFERRAL       Your provider has referred you to: [facility]: [facility] [phone]   [email or web address]    Please be aware that coverage of these services is subject to the terms and limitations of your health insurance plan.  Call member services at your health plan with any benefit or coverage questions.      Please bring the following with you to your appointment:    (1) Any X-Rays, CTs or MRIs which have been performed.  Contact the facility where they were done to arrange for  prior to your scheduled appointment.   (2) List of current medications   (3) This referral request   (4) Any documents/labs given to you for this referral                  Follow-up notes from your care team     Return in about 7 days (around 8/6/2018).      Who to contact     If you have questions or need follow up information about today's clinic visit or your schedule please contact [facility] directly at [phone].  Normal or non-critical lab and imaging results will be communicated to you by MyChart, letter or phone within 4 business days after the clinic has received the results. If you do not hear from us within 7 days, please contact the clinic through MyChart or phone. If you have a critical or abnormal lab result, we will notify you by phone as soon as possible.  Submit  refill requests through [facility] [employer] or call your pharmacy and they will forward the refill request to us. Please allow 3 business days for your refill to be completed.          Additional Information About Your Visit        Care EveryWhere ID     This is your Care EveryWhere ID. This could be used by other organizations to access your [facility] medical records  [id number]        Your Vitals Were     Pulse Temperature Respirations Pulse Oximetry BMI (Body Mass Index)       111 97.6  F (36.4  C) (Oral) 16 97% 26.74 kg/m2        Blood Pressure from Last 3 Encounters:   07/30/18 106/77   06/07/18 113/79   03/22/18 107/63    Weight from Last 3 Encounters:   07/30/18 189 lb (85.7 kg)   06/07/18 175 lb (79.4 kg)   03/22/18 189 lb (85.7 kg)              We Performed the Following     GENERAL SURG ADULT REFERRAL          Today's Medication Changes          These changes are accurate as of 7/30/18  1:36 PM.  If you have any questions, ask your nurse or doctor.               Start taking these medicines.        Dose/Directions    bumetanide 1 MG tablet   Commonly known as:  BUMEX   Used for:  Cardiomyopathy, alcoholic (H)   Started by:  Frank Childs MD        Dose:  1 mg   Take 1 tablet (1 mg) by mouth 2 times daily Take with Carvedilol and Ramipril.   Quantity:  60 tablet   Refills:  5       carvedilol 3.125 MG tablet   Commonly known as:  COREG   Used for:  Cardiomyopathy, alcoholic (H)   Started by:  Frank Childs MD        Dose:  3.125 mg   Take 1 tablet (3.125 mg) by mouth 2 times daily (with meals)   Quantity:  60 tablet   Refills:  5       order for DME   Used for:  Cardiomyopathy, alcoholic (H), Impaired mobility and ADLs   Started by:  Frank Childs MD        Equipment being ordered: Scooter   Quantity:  1 each   Refills:  0       ramipril 2.5 MG capsule   Commonly known as:  ALTACE   Used for:  Cardiomyopathy, alcoholic (H)   Started by:  Frank Childs MD        Dose:  2.5 mg   Take 1  capsule (2.5 mg) by mouth 2 times daily Take with Carvedilol and Bumex.   Quantity:  60 capsule   Refills:  5         Stop taking these medicines if you haven't already. Please contact your care team if you have questions.     LASIX PO   Stopped by:  Frank Childs MD           metoprolol succinate 25 MG 24 hr tablet   Commonly known as:  TOPROL XL   Stopped by:  Frank Childs MD                Where to get your medicines      These medications were sent to [id number] [address] AT [facility]  [address] [address]    Hours:  24-hours Phone:  [phone]     bumetanide 1 MG tablet    carvedilol 3.125 MG tablet    ramipril 2.5 MG capsule         Some of these will need a paper prescription and others can be bought over the counter.  Ask your nurse if you have questions.     Bring a paper prescription for each of these medications     order for DME                Primary Care Provider Office Phone # Fax #    Frank Childs [phone]       [address] [facility] [place] [facility] [address]        Equal Access to Services     MARIBEL PARKER : Hadii chepe belcher hadasho Soomaali, waaxda luqadaha, qaybta kaalmada adeegyada, moshe stephens. So [facility] [phone].    ATENCIÓN: Si habla español, tiene a anderson disposición servicios gratuitos de asistencia lingüística. Llame al [phone].    We comply with applicable federal civil rights laws and [place] laws. We do not discriminate on the basis of race, color, national origin, age, disability, sex, sexual orientation, or gender identity.            Thank you!     Thank you for choosing [facility]  for your care. Our goal is always to provide you with excellent care. Hearing back from our patients is one way we can continue to improve our services. Please take a few minutes to complete the written survey that you may  receive in the mail after your visit with us. Thank you!             Your Updated Medication List - Protect others around you: Learn how to safely use, store and throw away your medicines at www.disposemymeds.org.          This list is accurate as of 7/30/18  1:36 PM.  Always use your most recent med list.                   Brand Name Dispense Instructions for use Diagnosis    aspirin 81 MG EC tablet     30 tablet    Take 1 tablet (81 mg) by mouth daily    Hyperlipidemia LDL goal <100       atorvastatin 40 MG tablet    LIPITOR    30 tablet    Take 1 tablet (40 mg) by mouth daily    Hyperlipidemia LDL goal <100       bumetanide 1 MG tablet    BUMEX    60 tablet    Take 1 tablet (1 mg) by mouth 2 times daily Take with Carvedilol and Ramipril.    Cardiomyopathy, alcoholic (H)       carvedilol 3.125 MG tablet    COREG    60 tablet    Take 1 tablet (3.125 mg) by mouth 2 times daily (with meals)    Cardiomyopathy, alcoholic (H)       order for DME     1 each    Equipment being ordered: Scooter    Cardiomyopathy, alcoholic (H), Impaired mobility and ADLs       PANTOPRAZOLE SODIUM PO      Take 40 mg by mouth every morning (before breakfast)        QUEtiapine 50 MG tablet    SEROquel     Take by mouth daily        ramipril 2.5 MG capsule    ALTACE    60 capsule    Take 1 capsule (2.5 mg) by mouth 2 times daily Take with Carvedilol and Bumex.    Cardiomyopathy, alcoholic (H)
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
## 2018-07-31 NOTE — NURSING NOTE
Marlon Mackey is a 52 year old male who presents today for Ear Wash. with the complaint of tinnitus.    Ear exam showing wax occlusion in the bilateral ear.  hydrogen peroxide/warm water mixture were used to wash both ear(s).  The patients ear(s) were irrigated using the elephant spray with copious amount of wax extracted.  Patient tolerated procedure with mild difficulty.    Patient instructed to irrigate ears with warm water daily.    Outcome:Complete removal of ear wax in both ears.  DARÍO Saeed

## 2018-08-03 ENCOUNTER — OFFICE VISIT (OUTPATIENT)
Dept: FAMILY MEDICINE | Facility: CLINIC | Age: 53
End: 2018-08-03
Payer: COMMERCIAL

## 2018-08-03 VITALS
HEIGHT: 71 IN | DIASTOLIC BLOOD PRESSURE: 76 MMHG | HEART RATE: 103 BPM | RESPIRATION RATE: 20 BRPM | TEMPERATURE: 97.8 F | BODY MASS INDEX: 26.23 KG/M2 | WEIGHT: 187.4 LBS | OXYGEN SATURATION: 98 % | SYSTOLIC BLOOD PRESSURE: 98 MMHG

## 2018-08-03 DIAGNOSIS — I50.22 CHRONIC SYSTOLIC CONGESTIVE HEART FAILURE (H): Primary | ICD-10-CM

## 2018-08-03 DIAGNOSIS — I42.8 NONISCHEMIC CARDIOMYOPATHY (H): ICD-10-CM

## 2018-08-03 PROCEDURE — 99214 OFFICE O/P EST MOD 30 MIN: CPT | Performed by: INTERNAL MEDICINE

## 2018-08-03 ASSESSMENT — PAIN SCALES - GENERAL: PAINLEVEL: NO PAIN (0)

## 2018-08-03 NOTE — LETTER
"My Heart Failure Action Plan   Name: Marlon Mackey    YOB: 1965   Date: 8/3/2018    My doctor: Frank Childs     [address] [address] [phone]  My Diagnosis: {HF TYPE:362126::\"Systolic Heart Failure\"}   My Ejection Fraction: {EF%:446426}    My Exercise Goal: 30 minutes daily  .     My Weight Goal: ***  Wt Readings from Last 2 Encounters:   08/03/18 187 lb 6.4 oz (85 kg)   07/30/18 189 lb (85.7 kg)     Weigh yourself daily using the same scale. If you gain more than 2 pounds in 24 hours or 5 pounds in a week {HF WEIGHT GOAL:629530}    My Diet Goal: {HF DIET GOAL:106025::\"No added salt\"}    Emergency Room Visits:    Our goal is to improve your quality of life and help you avoid a visit to the emergency room or hospital.  If we work together, we can achieve this goal. But, if you feel you need to call 911 or go to the emergency room, please do so.  If you go to the emergency room, please bring your list of medicines and your daily weight chart with you.       GREEN ZONE     Doing well today    Weight gained is no more than 2 pounds a day or 5 pounds a week.    No swelling in feet, ankles, legs or stomach.    No more swelling than usual.    No more trouble breathing than usual.    No change in my sleep.    No other problems. Actions:    I am doing fine.  I will take my medicine, follow my diet, see my doctor, exercise, and watch for symptoms.           YELLOW ZONE         Having a bad day or flare up    Weight gain of more than 2 pounds in one day or 5 pounds in one week.    New swelling in ankle, leg, knee or thigh.    Bloating in belly, pants feel tighter.    Swelling in hands or face.    Coughing or trouble breathing while walking or talking.    Harder to breathe last night.    Have trouble sleeping, wake up short of breath.    Much more tired than usual.    Not eating.    Pain in my chest or bad leg cramps.    Feel " weak or dizzy. Actions:    I need to take action and call my doctor or nurse today.                 RED ZONE         Need medical care now    Weight gain of 5 pounds overnight.    Chest pain or pressure that does not go away.    Feel less alert.    Wheezing or have trouble breathing when at rest.    Cannot sleep lying down.    Cannot take my water pill.    Pass out or faint. Actions:    I need to call my doctor or nurse now!    Call 911 if I have chest pain or cannot breathe.

## 2018-08-03 NOTE — MR AVS SNAPSHOT
After Visit Summary   8/3/2018    Marlon Mackey    MRN: [id number]           Patient Information     Date Of Birth          1965        Visit Information        Provider Department      8/3/2018 11:20 AM Frank Childs MD [facility]        Today's Diagnoses     Chronic systolic congestive heart failure (H)    -  1    Nonischemic cardiomyopathy (H)          Care Instructions    At [facility], we strive to deliver an exceptional experience to you, every time we see you.  If you receive a survey in the mail, please send us back your thoughts. We really do value your feedback.    Based on your medical history, these are the current health maintenance/preventive care services that you are due for (some may have been done at this visit.)  Health Maintenance Due   Topic Date Due     HF ACTION PLAN Q3 YR  1965     ALT Q1 YR  09/05/1965     PNEUMOVAX 1X HI RISK PATIENT < 65 (NO IB MSG)  09/05/1966     ADVANCE DIRECTIVE PLANNING Q5 YRS  09/05/1982     DEPRESSION ACTION PLAN Q1 YR  09/05/1982     HIV SCREEN (SYSTEM ASSIGNED)  09/05/1982     HEPATITIS C SCREENING  09/05/1982     COLON CANCER SCREEN (SYSTEM ASSIGNED)  09/05/2014       Suggested websites for health information:  Www.[employer] : Up to date and easily searchable information on multiple topics.  Www.medlineplus.gov : medication info, interactive tutorials, watch real surgeries online  Www.familydoctor.org : good info from the Academy of Family Physicians  Www.cdc.gov : public health info, travel advisories, epidemics (H1N1)  Www.aap.org : children's health info, normal development, vaccinations  Www.health.state.[place].us : [place] dept of health, public health issues in [place], N1N1    Your care team:                            Family Medicine Internal Medicine   MD Frank Barkley MD Shantel Branch-Fleming, MD Katya Georgiev PA-C Megan Hill, APRN CNP Nam Ho, MD Pediatrics  "  CHRISTY Aranda CNP Paula Brito, MD Amelia Massimini APRN CNP Shaista Malik, MD Bethany Templen, MD Deborah Mielke, MD Kim Thein, APRN CNP      Clinic hours: Monday - Thursday 7 am-7 pm; Fridays 7 am-5 pm.   Urgent care: Monday - Friday 11 am-9 pm; Saturday and Sunday 9 am-5 pm.  Pharmacy : Monday -Thursday 8 am-8 pm; Friday 8 am-6 pm; Saturday and Sunday 9 am-5 pm.     Clinic: [phone]   Pharmacy: [phone]              Follow-ups after your visit        Additional Services     CARDIAC REHAB REFERRAL       *This therapy referral will be filtered to a centralized scheduling office at [facility] and the patient will receive a call to schedule an appointment at a [place] location most convenient for them.*     If you have not heard from the scheduling office within 2 business days, please call [phone] for all locations, with the exception of [place], please call [phone].    Please be aware that coverage of these services is subject to the terms and limitations of your health insurance plan.  Call member services at your health plan with any benefit or coverage questions.      **Note to Provider:  If you are referring outside of [place] for the therapy appointment, please list the name of the location in the \"special instructions\" above, print the referral and give to the patient to schedule the appointment.                   CARDIOLOGY EVAL ADULT REFERRAL       Preferred location:  [facility] [phone]   [email or web address]    Please be aware that coverage of these services is subject to the terms and limitations of your health insurance plan.  Call member services at your health plan with any benefit or coverage questions.      Please bring the following to your appointment:  Any x-rays, CTs or MRIs which have been performed. Contact the " facility where they were done to arrange for  prior to your scheduled appointment.    List of current medications  This referral request   Any documents/labs given to you for this referral            NUTRITION REFERRAL       Your provider has referred you to: [facility]: [facility] [phone]   [email or web address]    Please be aware that coverage of these services is subject to the terms and limitations of your health insurance plan.  Call member services at your health plan with any benefit or coverage questions.      Please bring the following with you to your appointment:    (1) This referral request  (2) Any documents given to you regarding this referral  (3) Any specific questions you have about diet and/or food choices            OCCUPATIONAL THERAPY REFERRAL       Treatment: Evaluation & Treatment: Scooter evaluation.  Special Instructions/Modalities: None  Special Programs:  Wheeled Mobility   Send orders to [facility].  Fax: [phone]                  Who to contact     If you have questions or need follow up information about today's clinic visit or your schedule please contact [facility] directly at [phone].  Normal or non-critical lab and imaging results will be communicated to you by MyChart, letter or phone within 4 business days after the clinic has received the results. If you do not hear from us within 7 days, please contact the clinic through MyChart or phone. If you have a critical or abnormal lab result, we will notify you by phone as soon as possible.  Submit refill requests through [employer] or call your pharmacy and they will forward the refill request to us. Please allow 3 business days for your refill to be completed.          Additional Information About Your Visit        Care EveryWhere ID     This is your Care EveryWhere ID. This could be used by other organizations to access your  "[facility] medical records  [id number]        Your Vitals Were     Pulse Temperature Respirations Height Pulse Oximetry BMI (Body Mass Index)    103 97.8  F (36.6  C) (Oral) 20 5' 10.5\" (1.791 m) 98% 26.51 kg/m2       Blood Pressure from Last 3 Encounters:   08/03/18 98/76   07/30/18 106/77   06/07/18 113/79    Weight from Last 3 Encounters:   08/03/18 187 lb 6.4 oz (85 kg)   07/30/18 189 lb (85.7 kg)   06/07/18 175 lb (79.4 kg)              We Performed the Following     CARDIAC REHAB REFERRAL     CARDIOLOGY EVAL ADULT REFERRAL     NUTRITION REFERRAL     OCCUPATIONAL THERAPY REFERRAL        Primary Care Provider Office Phone # Fax #    Frank Childs [phone]       [address]  [facility] [address]        Equal Access to Services     [facility]: Hadii aad ku hadasho Soomaali, waaxda luqadaha, qaybta kaalmada adeegyada, waxay idiin hayaan adeeg kharash la'aan . So [facility] [phone].    ATENCIÓN: Si habla español, tiene a anderson disposición servicios gratuitos de asistencia lingüística. Mary al [phone].    We comply with applicable federal civil rights laws and [place] laws. We do not discriminate on the basis of race, color, national origin, age, disability, sex, sexual orientation, or gender identity.            Thank you!     Thank you for choosing [facility]  for your care. Our goal is always to provide you with excellent care. Hearing back from our patients is one way we can continue to improve our services. Please take a few minutes to complete the written survey that you may receive in the mail after your visit with us. Thank you!             Your Updated Medication List - Protect others around you: Learn how to safely use, store and throw away your medicines at www.disposemymeds.org.          This list is accurate as of 8/3/18 11:45 AM.  Always use your most recent med list.                   Brand Name Dispense Instructions for use Diagnosis    " aspirin 81 MG EC tablet     30 tablet    Take 1 tablet (81 mg) by mouth daily    Hyperlipidemia LDL goal <100       atorvastatin 40 MG tablet    LIPITOR    30 tablet    Take 1 tablet (40 mg) by mouth daily    Hyperlipidemia LDL goal <100       bumetanide 1 MG tablet    BUMEX    60 tablet    Take 1 tablet (1 mg) by mouth 2 times daily Take with Carvedilol and Ramipril.    Cardiomyopathy, alcoholic (H)       carvedilol 3.125 MG tablet    COREG    60 tablet    Take 1 tablet (3.125 mg) by mouth 2 times daily (with meals)    Cardiomyopathy, alcoholic (H)       order for DME     1 each    Equipment being ordered: Scooter    Cardiomyopathy, alcoholic (H), Impaired mobility and ADLs       PANTOPRAZOLE SODIUM PO      Take 40 mg by mouth every morning (before breakfast)        QUEtiapine 50 MG tablet    SEROquel     Take by mouth daily        ramipril 2.5 MG capsule    ALTACE    60 capsule    Take 1 capsule (2.5 mg) by mouth 2 times daily Take with Carvedilol and Bumex.    Cardiomyopathy, alcoholic (H)
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
## 2018-08-03 NOTE — LETTER
My Depression Action Plan  Name: Marlon Mackey   Date of Birth 1965  Date: 8/3/2018    My doctor: Frakn Childs   My clinic: [address] [id number] [phone]          GREEN    ZONE   Good Control    What it looks like:     Things are going generally well. You have normal up s and down s. You may even feel depressed from time to time, but bad moods usually last less than a day.   What you need to do:  1. Continue to care for yourself (see self care plan)  2. Check your depression survival kit and update it as needed  3. Follow your physician s recommendations including any medication.  4. Do not stop taking medication unless you consult with your physician first.           YELLOW         ZONE Getting Worse    What it looks like:     Depression is starting to interfere with your life.     It may be hard to get out of bed; you may be starting to isolate yourself from others.    Symptoms of depression are starting to last most all day and this has happened for several days.     You may have suicidal thoughts but they are not constant.   What you need to do:     1. Call your care team, your response to treatment will improve if you keep your care team informed of your progress. Yellow periods are signs an adjustment may need to be made.     2. Continue your self-care, even if you have to fake it!    3. Talk to someone in your support network    4. Open up your depression survival kit           RED    ZONE Medical Alert - Get Help    What it looks like:     Depression is seriously interfering with your life.     You may experience these or other symptoms: You can t get out of bed most days, can t work or engage in other necessary activities, you have trouble taking care of basic hygiene, or basic responsibilities, thoughts of suicide or death that will not go away, self-injurious behavior.     What you need to do:  1. Call your  care team and request a same-day appointment. If they are not available (weekends or after hours) call your local crisis line, emergency room or 911.            Depression Self Care Plan / Survival Kit    Self-Care for Depression  Here s the deal. Your body and mind are really not as separate as most people think.  What you do and think affects how you feel and how you feel influences what you do and think. This means if you do things that people who feel good do, it will help you feel better.  Sometimes this is all it takes.  There is also a place for medication and therapy depending on how severe your depression is, so be sure to consult with your medical provider and/ or Behavioral Health Consultant if your symptoms are worsening or not improving.     In order to better manage my stress, I will:    Exercise  Get some form of exercise, every day. This will help reduce pain and release endorphins, the  feel good  chemicals in your brain. This is almost as good as taking antidepressants!  This is not the same as joining a gym and then never going! (they count on that by the way ) It can be as simple as just going for a walk or doing some gardening, anything that will get you moving.      Hygiene   Maintain good hygiene (Get out of bed in the morning, Make your bed, Brush your teeth, Take a shower, and Get dressed like you were going to work, even if you are unemployed).  If your clothes don't fit try to get ones that do.    Diet  I will strive to eat foods that are good for me, drink plenty of water, and avoid excessive sugar, caffeine, alcohol, and other mood-altering substances.  Some foods that are helpful in depression are: complex carbohydrates, B vitamins, flaxseed, fish or fish oil, fresh fruits and vegetables.    Psychotherapy  I agree to participate in Individual Therapy (if recommended).    Medication  If prescribed medications, I agree to take them.  Missing doses can result in serious side effects.  I  understand that drinking alcohol, or other illicit drug use, may cause potential side effects.  I will not stop my medication abruptly without first discussing it with my provider.    Staying Connected With Others  I will stay in touch with my friends, family members, and my primary care provider/team.    Use your imagination  Be creative.  We all have a creative side; it doesn t matter if it s oil painting, sand castles, or mud pies! This will also kick up the endorphins.    Witness Beauty  (AKA stop and smell the roses) Take a look outside, even in mid-winter. Notice colors, textures. Watch the squirrels and birds.     Service to others  Be of service to others.  There is always someone else in need.  By helping others we can  get out of ourselves  and remember the really important things.  This also provides opportunities for practicing all the other parts of the program.    Humor  Laugh and be silly!  Adjust your TV habits for less news and crime-drama and more comedy.    Control your stress  Try breathing deep, massage therapy, biofeedback, and meditation. Find time to relax each day.     My support system    Clinic Contact:  Phone number:    Contact 1:  Phone number:    Contact 2:  Phone number:    Orthodox/:  Phone number:    Therapist:  Phone number:    Local crisis center:    Phone number:    Other community support:  Phone number:

## 2018-08-03 NOTE — PATIENT INSTRUCTIONS
At [facility], we strive to deliver an exceptional experience to you, every time we see you.  If you receive a survey in the mail, please send us back your thoughts. We really do value your feedback.    Based on your medical history, these are the current health maintenance/preventive care services that you are due for (some may have been done at this visit.)  Health Maintenance Due   Topic Date Due     HF ACTION PLAN Q3 YR  1965     ALT Q1 YR  09/05/1965     PNEUMOVAX 1X HI RISK PATIENT < 65 (NO IB MSG)  09/05/1966     ADVANCE DIRECTIVE PLANNING Q5 YRS  09/05/1982     DEPRESSION ACTION PLAN Q1 YR  09/05/1982     HIV SCREEN (SYSTEM ASSIGNED)  09/05/1982     HEPATITIS C SCREENING  09/05/1982     COLON CANCER SCREEN (SYSTEM ASSIGNED)  09/05/2014       Suggested websites for health information:  Www.Izzy Money : Up to date and easily searchable information on multiple topics.  Www.Spoke.gov : medication info, interactive tutorials, watch real surgeries online  Www.familydoctor.org : good info from the Academy of Family Physicians  Www.cdc.gov : public health info, travel advisories, epidemics (H1N1)  Www.aap.org : children's health info, normal development, vaccinations  Www.health.[facility].[place].us : [place] dept of health, public health issues in [place], N1N1    Your care team:                            Family Medicine Internal Medicine   MD Frank Barkley MD Shantel Branch-Fleming, MD Katya Georgiev PA-C Megan Hill, SAMY Quintanilla MD Pediatrics   CHRISTY Aranda, MD Gypsy Ulloa APRN CNP   MD Hyacinth Pepper MD Deborah Mielke, MD Kim Thein, APRN CNP      Clinic hours: Monday - Thursday 7 am-7 pm; Fridays 7 am-5 pm.   Urgent care: Monday - Friday 11 am-9 pm; Saturday and Sunday 9 am-5 pm.  Pharmacy : Monday -Thursday 8 am-8 pm; Friday 8 am-6 pm; Saturday and Sunday 9 am-5 pm.     Clinic: [phone]    Pharmacy: [phone]

## 2018-08-06 ENCOUNTER — TELEPHONE (OUTPATIENT)
Dept: FAMILY MEDICINE | Facility: CLINIC | Age: 53
End: 2018-08-06

## 2018-08-06 NOTE — TELEPHONE ENCOUNTER
Dr. Childs is patient just needing the referral for occupational therapy faxed or was there a form? Please advise.  Papito Stahl,  [facility]    Patient's referral is faxed to [facility] and rehab at fax # [phone].  Papito Stahl,  [employer] [facility]

## 2018-08-06 NOTE — TELEPHONE ENCOUNTER
There is no such forms.  Re-fax occupational therapy orders to [facility] (if not yet done).

## 2018-08-06 NOTE — TELEPHONE ENCOUNTER
Reason for Call:  Other Forms    Detailed comments: Pt was following up on three faxes that Dr. Childs was suppose to fax to [facility] and [facility] but when he went to contact [place] they said they had not received the forms.  He would like for Dr. Childs to fill out and fax forms as soon as possible and to contact Pt to confirm they were sent.    Phone Number Patient can be reached at: Home number on file [phone] (home)    Best Time: anytime    Can we leave a detailed message on this number? YES    Call taken on 8/6/2018 at 8:07 AM by Jose Rafael Browne

## 2018-08-06 NOTE — TELEPHONE ENCOUNTER
Called, unable to leave a VM, patient's mail box is full.  Papito Stahl,  [facility]    If patient calls back to check the status of this message, please let patient know Dr. Childs does not have any forms to fill out for patient, the referral for [facility] has been faxed this morning, if patient has any more question please document in this message and route the question back to the care team. If no other questions you may close this encounter.  Papito Stahl,  [facility]

## 2018-08-06 NOTE — TELEPHONE ENCOUNTER
Referral has been faxed this morning.  Papito Stahl,  [facility]    Please call patient to notify patient, if patient call back to check the status of this message please let patient know the referral has been faxed and per Dr. Childs there was no forms for Dr. Childs to fill our.  Papito Stahl,  [facility]

## 2018-08-07 ENCOUNTER — TELEPHONE (OUTPATIENT)
Dept: FAMILY MEDICINE | Facility: CLINIC | Age: 53
End: 2018-08-07

## 2018-08-07 NOTE — TELEPHONE ENCOUNTER
Called, unable to leave a VM, patient's mail box is full.  Papito Stahl,  [employer] [facility] [facility]     If patient calls back to check the status of this message, please let patient know Dr. Childs does not have any forms to fill out for patient, the referral for [facility] has been faxed this morning, if patient has any more question please document in this message and route the question back to the care team. If no other questions you may close this encounter.  Papito Stahl,  [facility]

## 2018-08-07 NOTE — TELEPHONE ENCOUNTER
Panel Management Review        Last Office Visit with this department:     Fail List measure:     Depression / Dysthymia review    Measure:  Needs PHQ-9 score of 4 or less during index window.  Administer PHQ-9 and if score is 5 or more, send encounter to provider for next steps.    5 - 7 month window range:     INDEX 3/22/18  FU START 7/22/18  FU END 11/22/18    PHQ-9 SCORE 3/22/2018   Total Score 15       If PHQ-9 recheck is 5 or more, route to provider for next steps.    Patient is due for:  PHQ9      Patient is due/failing the following:   PHQ9    Action needed:   Patient needs to do PHQ9.    Type of outreach:    Phone, left message for patient to call back.     Questions for provider review:    None                                                                                                                                    Regulo Turner MA

## 2018-08-09 NOTE — TELEPHONE ENCOUNTER
Patient has not called the care team. Nor check to see the status of patient's message. Closing encounter.  Papito Stahl,  [facility]

## 2018-08-30 PROBLEM — I50.22 CHRONIC SYSTOLIC HEART FAILURE (H): Status: ACTIVE | Noted: 2018-08-30

## 2018-10-08 ENCOUNTER — TELEPHONE (OUTPATIENT)
Dept: FAMILY MEDICINE | Facility: CLINIC | Age: 53
End: 2018-10-08

## 2018-10-08 NOTE — TELEPHONE ENCOUNTER
..Reason for Call:  power wheelchair    Detailed comments: power wheelchair; [employer] [facility] supply is waiting for your response//has been waiting 6 weeks;     Patient also mentioned he has had chest pain upon lying down, when upright seems ok; has been happening for at least three occasions. Triaged    Phone Number Patient can be reached at: Home number on file [phone] (home)    Best Time: anytime    Can we leave a detailed message on this number? NO    Call taken on 10/8/2018 at 1:28 PM by Mary Herman

## 2018-10-08 NOTE — TELEPHONE ENCOUNTER
Marlon Mackey is a 53 year old male who calls with chest pain.    NURSING ASSESSMENT:  Description:  Chest pain when laying down; ok when he is upright  Onset/duration:  2-3 days ago  Precip. factors:  Smoker, history of systolic heart failure  Associated symptoms:  Chest pain at rest and awakens patient at night. Denies dizziness, nausea or vomiting,   Improves/worsens symptoms:  Worsens with laying down  Pain scale (0-10)   6/10  LMP/preg/breast feeding:  N/A  Last exam/Treatment:  8/3/18  Allergies: No Known Allergies    MEDICATIONS:   Taking medication(s) as prescribed? Yes  Taking over the counter medication(s?) No  Any medication side effects? Not Applicable    Any barriers to taking medication(s) as prescribed?  No  Medication(s) improving/managing symptoms?  N/A  Medication reconciliation completed: No      NURSING PLAN: Nursing advice to patient go to ED ASAP    RECOMMENDED DISPOSITION:  To ED, another person to drive - Patient is going to have his son drive him to [facility]    Will comply with recommendation: Yes  If further questions/concerns or if symptoms do not improve, worsen or new symptoms develop, call your PCP or [facility] as soon as possible.      Guideline used:  Telephone Triage Protocols for Nurses, Fifth Edition, Socorro Pulido  Chest Pain  Nursing Judgement    Sridhar Yun RN, BSN

## 2018-10-12 NOTE — TELEPHONE ENCOUNTER
Reason for Call:  Other medical equipment    Detailed comments: Pt calling to follow up on request for power wheel chair for Pt has had difficulty with mobility in moving around.  He said [facility] has been trying to send forms out to Dr. Childs to send but they have not heard back from him.  Pt would like to see if another covering provider can send in order for chair .  He would like a call back for an update.    Phone Number Patient can be reached at: Home number on file [phone] (home)    Best Time: anytime    Can we leave a detailed message on this number? YES    Call taken on 10/12/2018 at 1:06 PM by Jose Rafael Browne

## 2018-10-14 NOTE — TELEPHONE ENCOUNTER
Forms completed. Placed in TC basket.  Print and attach July 30, 2018 clinic visit with this provider.

## 2018-10-15 ENCOUNTER — TELEPHONE (OUTPATIENT)
Dept: FAMILY MEDICINE | Facility: CLINIC | Age: 53
End: 2018-10-15

## 2018-10-15 NOTE — TELEPHONE ENCOUNTER
Reason for Call:  Other letter    Detailed comments: patient needs a letter to be able to get a power scooter. Needs to be sent to the [employer] supply in [place], did not have fax or etc.  The letter just needs to say he is disabled.     Phone Number Patient can be reached at: Home number on file [phone] (home)    Best Time: any    Can we leave a detailed message on this number? YES    Call taken on 10/15/2018 at 11:47 AM by Saloni Posey

## 2018-10-15 NOTE — TELEPHONE ENCOUNTER
Printed 7/30/18 office visit notes and faxed this with the signed forms to [facility], Atten: Carlton Cao, [phone], right fax confirmed at 11:33 am today and [phone], right fax confirmed at 11:34 am today. Copy of form to TC and abstracting.  Samira Johnson MA/  [facility] [employer] [facility] [place]

## 2018-10-30 ENCOUNTER — OFFICE VISIT (OUTPATIENT)
Dept: URGENT CARE | Facility: URGENT CARE | Age: 53
End: 2018-10-30
Payer: COMMERCIAL

## 2018-10-30 VITALS
TEMPERATURE: 96.3 F | BODY MASS INDEX: 24.9 KG/M2 | SYSTOLIC BLOOD PRESSURE: 86 MMHG | RESPIRATION RATE: 16 BRPM | WEIGHT: 176 LBS | HEART RATE: 79 BPM | DIASTOLIC BLOOD PRESSURE: 60 MMHG | OXYGEN SATURATION: 99 %

## 2018-10-30 DIAGNOSIS — I50.22 CHRONIC SYSTOLIC CONGESTIVE HEART FAILURE (H): ICD-10-CM

## 2018-10-30 DIAGNOSIS — R22.0 SWELLING OF FACE: Primary | ICD-10-CM

## 2018-10-30 PROCEDURE — 99214 OFFICE O/P EST MOD 30 MIN: CPT | Performed by: NURSE PRACTITIONER

## 2018-10-30 ASSESSMENT — ENCOUNTER SYMPTOMS
SHORTNESS OF BREATH: 0
RHINORRHEA: 0
SORE THROAT: 0
CHILLS: 0
VOMITING: 0
NAUSEA: 0
FEVER: 0
DIARRHEA: 0
DIAPHORESIS: 0
COUGH: 0
FATIGUE: 1

## 2018-10-30 ASSESSMENT — PAIN SCALES - GENERAL: PAINLEVEL: NO PAIN (0)

## 2018-10-30 NOTE — MR AVS SNAPSHOT
After Visit Summary   10/30/2018    Marlon Mackey    MRN: [id number]           Patient Information     Date Of Birth          1965        Visit Information        Provider Department      10/30/2018 11:35 AM Marybeth Scott NP [facility]        Today's Diagnoses     Swelling of face    -  1    Chronic systolic congestive heart failure (H)           Follow-ups after your visit        Your next 10 appointments already scheduled     Oct 31, 2018 12:20 PM CDT   Office Visit with Demetra Weinstein MD   [facility] ([facility])    [address] [place] [id number] [phone]           Bring a current list of meds and any records pertaining to this visit. For Physicals, please bring immunization records and any forms needing to be filled out. Please arrive 10 minutes early to complete paperwork.              Who to contact     If you have questions or need follow up information about today's clinic visit or your schedule please contact [facility] directly at [phone].  Normal or non-critical lab and imaging results will be communicated to you by MyChart, letter or phone within 4 business days after the clinic has received the results. If you do not hear from us within 7 days, please contact the clinic through MyChart or phone. If you have a critical or abnormal lab result, we will notify you by phone as soon as possible.  Submit refill requests through [employer] or call your pharmacy and they will forward the refill request to us. Please allow 3 business days for your refill to be completed.          Additional Information About Your Visit        Care EveryWhere ID     This is your Care EveryWhere ID. This could be used by other organizations to access your [place] medical records  [id number]        Your Vitals Were     Pulse Temperature Respirations Pulse Oximetry BMI (Body Mass Index)       79 96.3   F (35.7  C) (Tympanic) 16 99% 24.9 kg/m2        Blood Pressure from Last 3 Encounters:   10/30/18 (!) 86/60   08/03/18 98/76   07/30/18 106/77    Weight from Last 3 Encounters:   10/30/18 176 lb (79.8 kg)   08/03/18 187 lb 6.4 oz (85 kg)   07/30/18 189 lb (85.7 kg)              Today, you had the following     No orders found for display       Primary Care Provider Office Phone # Fax #    Frank Childs [phone]       [address]  [facility] [address]        Equal Access to Services     MARIBEL PARKER : Jani choudharyo Somari, waaxda luqadaha, qaybta kaalmada adeegyada, moshe yeager . So [facility] [phone].    ATENCIÓN: Si habla español, tiene a anderson disposición servicios gratuitos de asistencia lingüística. Llame al [phone].    We comply with applicable federal civil rights laws and [place] laws. We do not discriminate on the basis of race, color, national origin, age, disability, sex, sexual orientation, or gender identity.            Thank you!     Thank you for choosing [facility]  for your care. Our goal is always to provide you with excellent care. Hearing back from our patients is one way we can continue to improve our services. Please take a few minutes to complete the written survey that you may receive in the mail after your visit with us. Thank you!             Your Updated Medication List - Protect others around you: Learn how to safely use, store and throw away your medicines at www.disposemymeds.org.          This list is accurate as of 10/30/18 12:32 PM.  Always use your most recent med list.                   Brand Name Dispense Instructions for use Diagnosis    aspirin 81 MG EC tablet     30 tablet    Take 1 tablet (81 mg) by mouth daily    Hyperlipidemia LDL goal <100       atorvastatin 40 MG tablet    LIPITOR    30 tablet    Take 1 tablet (40 mg) by mouth daily    Hyperlipidemia LDL goal <100        bumetanide 1 MG tablet    BUMEX    60 tablet    Take 1 tablet (1 mg) by mouth 2 times daily Take with Carvedilol and Ramipril.    Cardiomyopathy, alcoholic (H)       carvedilol 3.125 MG tablet    COREG    60 tablet    Take 1 tablet (3.125 mg) by mouth 2 times daily (with meals)    Cardiomyopathy, alcoholic (H)       order for DME     1 each    Equipment being ordered: Scooter    Cardiomyopathy, alcoholic (H), Impaired mobility and ADLs       PANTOPRAZOLE SODIUM PO      Take 40 mg by mouth every morning (before breakfast)        QUEtiapine 50 MG tablet    SEROquel     Take by mouth daily        ramipril 2.5 MG capsule    ALTACE    60 capsule    Take 1 capsule (2.5 mg) by mouth 2 times daily Take with Carvedilol and Bumex.    Cardiomyopathy, alcoholic (H)
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
## 2018-10-31 ENCOUNTER — OFFICE VISIT (OUTPATIENT)
Dept: FAMILY MEDICINE | Facility: CLINIC | Age: 53
End: 2018-10-31
Payer: COMMERCIAL

## 2018-10-31 VITALS
SYSTOLIC BLOOD PRESSURE: 78 MMHG | WEIGHT: 187 LBS | OXYGEN SATURATION: 99 % | BODY MASS INDEX: 26.18 KG/M2 | HEIGHT: 71 IN | DIASTOLIC BLOOD PRESSURE: 58 MMHG | TEMPERATURE: 97.5 F | HEART RATE: 91 BPM

## 2018-10-31 DIAGNOSIS — K04.7 DENTAL INFECTION: ICD-10-CM

## 2018-10-31 DIAGNOSIS — I42.6 CARDIOMYOPATHY, ALCOHOLIC (H): Chronic | ICD-10-CM

## 2018-10-31 DIAGNOSIS — I50.22 CHRONIC SYSTOLIC CONGESTIVE HEART FAILURE (H): Primary | ICD-10-CM

## 2018-10-31 DIAGNOSIS — R22.0 FACIAL SWELLING: ICD-10-CM

## 2018-10-31 PROCEDURE — 99214 OFFICE O/P EST MOD 30 MIN: CPT | Performed by: PREVENTIVE MEDICINE

## 2018-10-31 RX ORDER — AMOXICILLIN AND CLAVULANATE POTASSIUM 500; 125 MG/1; MG/1
1 TABLET, FILM COATED ORAL 3 TIMES DAILY
Qty: 21 TABLET | Refills: 0 | Status: SHIPPED | OUTPATIENT
Start: 2018-10-31 | End: 2019-01-31

## 2018-10-31 ASSESSMENT — PAIN SCALES - GENERAL: PAINLEVEL: NO PAIN (0)

## 2018-10-31 NOTE — MR AVS SNAPSHOT
After Visit Summary   10/31/2018    Marlon Mackey    MRN: [id number]           Patient Information     Date Of Birth          1965        Visit Information        Provider Department      10/31/2018 12:20 PM Demetra Weinstein MD [facility]        Today's Diagnoses     Chronic systolic congestive heart failure (H)    -  1    Cardiomyopathy, alcoholic (H)        Dental infection        Facial swelling           Follow-ups after your visit        Additional Services     CARE COORDINATION REFERRAL       Services are provided by a Care Coordinator for people with complex needs such as: medical, social, or financial troubles.  The Care Coordinator works with the patient and their Primary Care Provider to determine health goals, obtain resources, achieve outcomes, and develop care plans that help coordinate the patient's care.     Reason for Referral: Complex Medical Concerns/Education: Compliance with medical treatment plan and Mental Wellness (Health) (Mental Illness/Chemical Depedency): Chemical Health Assessments    Additional pertinent details:  Patient continues to drink, history of likely alcoholic cardiomyopathy.   Severe CHF, does not monitor his weight at home, and has not followed up with Cardiology per their recommendations.     Clinical Staff have discussed the Care Coordination Referral with the patient and/or caregiver: yes                  Follow-up notes from your care team     Return in about 1 week (around 11/7/2018) for Routine Visit.      Your next 10 appointments already scheduled     Nov 08, 2018 11:20 AM CST   Office Visit with Frank Childs MD   [facility] ([facility])    [address] [place] [address] [phone]           Bring a current list of meds and any records pertaining to this visit. For Physicals, please bring immunization records and any forms needing to be filled out. Please  "arrive 10 minutes early to complete paperwork.              Who to contact     If you have questions or need follow up information about today's clinic visit or your schedule please contact [facility] LULY SOLANO directly at [phone].  Normal or non-critical lab and imaging results will be communicated to you by MyChart, letter or phone within 4 business days after the clinic has received the results. If you do not hear from us within 7 days, please contact the clinic through MyChart or phone. If you have a critical or abnormal lab result, we will notify you by phone as soon as possible.  Submit refill requests through [facility] or call your pharmacy and they will forward the refill request to us. Please allow 3 business days for your refill to be completed.          Additional Information About Your Visit        Care EveryWhere ID     This is your Care EveryWhere ID. This could be used by other organizations to access your [facility] medical records  [id number]        Your Vitals Were     Pulse Temperature Height Pulse Oximetry BMI (Body Mass Index)       91 97.5  F (36.4  C) (Tympanic) 5' 10.5\" (1.791 m) 99% 26.45 kg/m2        Blood Pressure from Last 3 Encounters:   10/31/18 (!) 78/58   10/30/18 (!) 86/60   08/03/18 98/76    Weight from Last 3 Encounters:   10/31/18 187 lb (84.8 kg)   10/30/18 176 lb (79.8 kg)   08/03/18 187 lb 6.4 oz (85 kg)              We Performed the Following     CARE COORDINATION REFERRAL          Today's Medication Changes          These changes are accurate as of 10/31/18  1:23 PM.  If you have any questions, ask your nurse or doctor.               Start taking these medicines.        Dose/Directions    amoxicillin-clavulanate 500-125 MG per tablet   Commonly known as:  AUGMENTIN   Used for:  Dental infection, Facial swelling   Started by:  Demetra Weinstein MD        Dose:  1 tablet   Take 1 tablet by mouth 3 times daily for 7 days   Quantity:  21 tablet   Refills:  0          "   Where to get your medicines      These medications were [place] [facility] [place] [address]  [address], [facility] [address]     Phone:  [phone]     amoxicillin-clavulanate 500-125 MG per tablet                Primary Care Provider Office Phone # Fax #    Frank Childs [phone]       [address]  [facility] [address]        Equal Access to Services     [facility]in[facility]ELASQUEZ : Hadii aad ku hadasho Soomaali, waaxda luqadaha, qaybta kaalmada adeegyada, waxay idiin hayaan adeeg kharash laty . So [facility] [phone].    ATENCIÓN: Si habla español, tiene a anderson disposición servicios gratuitos de asistencia lingüística. [facility] [phone].    We comply with applicable federal civil rights laws and [place] laws. We do not discriminate on the basis of race, color, national origin, age, disability, sex, sexual orientation, or gender identity.            Thank you!     Thank you for choosing [facility]  for your care. Our goal is always to provide you with excellent care. Hearing back from our patients is one way we can continue to improve our services. Please take a few minutes to complete the written survey that you may receive in the mail after your visit with us. Thank you!             Your Updated Medication List - Protect others around you: Learn how to safely use, store and throw away your medicines at www.disposemymeds.org.          This list is accurate as of 10/31/18  1:23 PM.  Always use your most recent med list.                   Brand Name Dispense Instructions for use Diagnosis    amoxicillin-clavulanate 500-125 MG per tablet    AUGMENTIN    21 tablet    Take 1 tablet by mouth 3 times daily for 7 days    Dental infection, Facial swelling       aspirin 81 MG EC tablet     30 tablet    Take 1 tablet (81 mg) by mouth daily    Hyperlipidemia LDL goal <100       atorvastatin 40 MG tablet    LIPITOR    30 tablet     Take 1 tablet (40 mg) by mouth daily    Hyperlipidemia LDL goal <100       bumetanide 1 MG tablet    BUMEX    60 tablet    Take 1 tablet (1 mg) by mouth 2 times daily Take with Carvedilol and Ramipril.    Cardiomyopathy, alcoholic (H)       carvedilol 3.125 MG tablet    COREG    60 tablet    Take 1 tablet (3.125 mg) by mouth 2 times daily (with meals)    Cardiomyopathy, alcoholic (H)       order for DME     1 each    Equipment being ordered: Scooter    Cardiomyopathy, alcoholic (H), Impaired mobility and ADLs       PANTOPRAZOLE SODIUM PO      Take 40 mg by mouth every morning (before breakfast)        QUEtiapine 50 MG tablet    SEROquel     Take by mouth daily        ramipril 2.5 MG capsule    ALTACE    60 capsule    Take 1 capsule (2.5 mg) by mouth 2 times daily Take with Carvedilol and Bumex.    Cardiomyopathy, alcoholic (H)
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
## 2018-11-01 ENCOUNTER — PATIENT OUTREACH (OUTPATIENT)
Dept: CARE COORDINATION | Facility: CLINIC | Age: 53
End: 2018-11-01

## 2018-11-01 NOTE — PROGRESS NOTES
Name: ELSY FOSTER Date: 10/31/2018       Home: [phone]  Work: [phone]          Payor:              CRICKET     Plan:               CRICKET CHANEL     Sponsor Code:       [id number]     Subscriber ID:      [id number]     Subscriber Name:    ELSY FOSTER     Subscriber Address: [address] [place], [place] [address]                         [place] [address]          Effective From:     02/01/18     Effective To:            Group Number:       [id number]     Group Name  : Not Available               Date       Provider                   Department   Center            10/31/2018 [id number] RUST       BK[facility]         [facility]          Order Date:10/31/2018     Ordering User:DEMETRA RUST [[email or web address]]     Encounter Provider:Demetra Rust MD [[id number]]     Authorizing Provider: Demetra Rust MD [[id number]]     Department:BIBI FP/IM/PEDS[[id number]]          Ordering Provider NPI: [id number]  Demetra Rust     [facility] [facility]      [address]     Phone: [phone]                    Procedure Requested       9022.110 CARE COORDINATION REFERRAL            [#[id number]]         Priority: Routine  Class: Local Print         Comment:Services are provided by a Care Coordinator for people with complex                  needs such as: medical, social, or financial troubles.  The Care                  Coordinator works with the patient and their Primary Care Provider                  to determine health goals, obtain resources, achieve outcomes, and                  develop care plans that help coordinate the patient's care.                                     Reason for Referral: Complex Medical Concerns/Education:                   Compliance with medical treatment plan and Mental Wellness                   (Health) (Mental Illness/Chemical Depedency): Chemical Health                   Assessments                                    Additional pertinent details:  Patient  continues to drink, history                   of likely alcoholic cardiomyopathy.                   Severe CHF, does not monitor his weight at home, and has not                   followed up with Cardiology per their recommendations.                                     Clinical Staff have discussed the Care Coordination Referral with                   the patient and/or caregiver: yes       Associated Diagnoses         I50.22 Chronic systolic congestive heart failure (H)         I42.6 Cardiomyopathy, alcoholic (H)

## 2018-11-01 NOTE — LETTER
[place] CARE COORDINATION  [address] [address]    November 2, 2018    Marlon Mackey  [address]  [facility] [address]      Dear Marlon,    I am a clinic care coordinator who works with Frank Childs MD at [facility].  I wanted to introduce myself and provide you with my contact information so that you can call me with questions or concerns about your health care. Below is a description of clinic care coordination and how I can further assist you.     The clinic care coordinator is a registered nurse and/or  who understand the health care system. The goal of clinic care coordination is to help you manage your health and improve access to the [facility] in the most efficient manner. The registered nurse can assist you in meeting your health care goals by providing education, coordinating services, and strengthening the communication among your providers. The  can assist you with financial, behavioral, psychosocial, chemical dependency, counseling, and/or psychiatric resources.    Please feel free to contact me at [phone], with any questions or concerns. We at [facility] are focused on providing you with the highest-quality healthcare experience possible and that all starts with you.     Sincerely,     Melissa Behl BSN, RN, PHN  [facility] Care Coordinator  [phone]     Enclosed: I have enclosed a copy of the Complex Care Plan. This has helpful information and goals that we have talked about. Please keep this in an easy to access place to use as needed.

## 2018-11-01 NOTE — PROGRESS NOTES
Clinic Care Coordination Contact  [facility]/Voicemail    Referral Source: PCP  Clinical Data: Care Coordinator Outreach  Outreach attempted x 1.  No answer and unable to leave message as the voicemail is full and unable to take a message.  Plan: Care Coordinator will hold on sending the letter until the subsequent calls are made. Care Coordinator will try to reach patient again in 1-2 business days.      Clark Jacob, MSN, RN, PHN I Clinic Care Coordinator  [facility]  Phone: [phone]  [email or web address]
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
## 2018-11-24 ENCOUNTER — NURSE TRIAGE (OUTPATIENT)
Dept: NURSING | Facility: CLINIC | Age: 53
End: 2018-11-24
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
## 2018-12-03 ENCOUNTER — PATIENT OUTREACH (OUTPATIENT)
Dept: CARE COORDINATION | Facility: CLINIC | Age: 53
End: 2018-12-03

## 2018-12-03 ASSESSMENT — ACTIVITIES OF DAILY LIVING (ADL): DEPENDENT_IADLS:: TRANSPORTATION;CLEANING;LAUNDRY
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
## 2018-12-04 ENCOUNTER — OFFICE VISIT (OUTPATIENT)
Dept: FAMILY MEDICINE | Facility: CLINIC | Age: 53
End: 2018-12-04
Payer: COMMERCIAL

## 2018-12-04 ENCOUNTER — RADIANT APPOINTMENT (OUTPATIENT)
Dept: GENERAL RADIOLOGY | Facility: CLINIC | Age: 53
End: 2018-12-04
Attending: INTERNAL MEDICINE
Payer: COMMERCIAL

## 2018-12-04 VITALS
HEIGHT: 71 IN | HEART RATE: 79 BPM | WEIGHT: 180 LBS | RESPIRATION RATE: 16 BRPM | BODY MASS INDEX: 25.2 KG/M2 | OXYGEN SATURATION: 97 % | DIASTOLIC BLOOD PRESSURE: 40 MMHG | TEMPERATURE: 94.9 F | SYSTOLIC BLOOD PRESSURE: 60 MMHG

## 2018-12-04 DIAGNOSIS — I42.6 ALCOHOLIC CARDIOMYOPATHY (H): ICD-10-CM

## 2018-12-04 DIAGNOSIS — R07.89 OTHER CHEST PAIN: Primary | ICD-10-CM

## 2018-12-04 DIAGNOSIS — I87.1 SUPERIOR VENA CAVA SYNDROME: ICD-10-CM

## 2018-12-04 DIAGNOSIS — F10.10 ALCOHOL ABUSE: ICD-10-CM

## 2018-12-04 LAB
ALBUMIN SERPL-MCNC: 2.7 G/DL (ref 3.4–5)
ALP SERPL-CCNC: 207 U/L (ref 40–150)
ALT SERPL W P-5'-P-CCNC: 67 U/L (ref 0–70)
ANION GAP SERPL CALCULATED.3IONS-SCNC: 9 MMOL/L (ref 3–14)
ANISOCYTOSIS BLD QL SMEAR: SLIGHT
AST SERPL W P-5'-P-CCNC: 103 U/L (ref 0–45)
BASOPHILS NFR BLD AUTO: 1 %
BILIRUB SERPL-MCNC: 2.4 MG/DL (ref 0.2–1.3)
BUN SERPL-MCNC: 24 MG/DL (ref 7–30)
CALCIUM SERPL-MCNC: 7.5 MG/DL (ref 8.5–10.1)
CHLORIDE SERPL-SCNC: 94 MMOL/L (ref 94–109)
CO2 SERPL-SCNC: 27 MMOL/L (ref 20–32)
CREAT SERPL-MCNC: 1.43 MG/DL (ref 0.66–1.25)
DIFFERENTIAL METHOD BLD: ABNORMAL
EOSINOPHIL NFR BLD AUTO: 1 %
ERYTHROCYTE [DISTWIDTH] IN BLOOD BY AUTOMATED COUNT: 15.4 % (ref 10–15)
ETHANOL SERPL-MCNC: 0.29 G/DL
GFR SERPL CREATININE-BSD FRML MDRD: 51 ML/MIN/1.7M2
GLUCOSE SERPL-MCNC: 95 MG/DL (ref 70–99)
HCT VFR BLD AUTO: 32.2 % (ref 40–53)
HGB BLD-MCNC: 12.1 G/DL (ref 13.3–17.7)
HYPOCHROMIA BLD QL: PRESENT
LYMPHOCYTES NFR BLD AUTO: 23 %
MCH RBC QN AUTO: 37.7 PG (ref 26.5–33)
MCHC RBC AUTO-ENTMCNC: 37.6 G/DL (ref 31.5–36.5)
MCV RBC AUTO: 100 FL (ref 78–100)
MONOCYTES NFR BLD AUTO: 11 %
NEUTROPHILS NFR BLD AUTO: 64 %
NT-PROBNP SERPL-MCNC: 2443 PG/ML (ref 0–125)
PLATELET # BLD AUTO: 136 10E9/L (ref 150–450)
PLATELET # BLD EST: ABNORMAL 10*3/UL
POIKILOCYTOSIS BLD QL SMEAR: SLIGHT
POTASSIUM SERPL-SCNC: 4.1 MMOL/L (ref 3.4–5.3)
PROT SERPL-MCNC: 6.2 G/DL (ref 6.8–8.8)
RBC # BLD AUTO: 3.21 10E12/L (ref 4.4–5.9)
SODIUM SERPL-SCNC: 130 MMOL/L (ref 133–144)
SPHEROCYTES BLD QL SMEAR: SLIGHT
TARGETS BLD QL SMEAR: ABNORMAL
TROPONIN I SERPL-MCNC: 0.02 UG/L (ref 0–0.04)
WBC # BLD AUTO: 5.8 10E9/L (ref 4–11)

## 2018-12-04 PROCEDURE — 71046 X-RAY EXAM CHEST 2 VIEWS: CPT | Mod: FY

## 2018-12-04 PROCEDURE — 99214 OFFICE O/P EST MOD 30 MIN: CPT | Performed by: INTERNAL MEDICINE

## 2018-12-04 PROCEDURE — 80053 COMPREHEN METABOLIC PANEL: CPT | Performed by: INTERNAL MEDICINE

## 2018-12-04 PROCEDURE — 80320 DRUG SCREEN QUANTALCOHOLS: CPT | Performed by: INTERNAL MEDICINE

## 2018-12-04 PROCEDURE — 36415 COLL VENOUS BLD VENIPUNCTURE: CPT | Performed by: INTERNAL MEDICINE

## 2018-12-04 PROCEDURE — 85025 COMPLETE CBC W/AUTO DIFF WBC: CPT | Performed by: INTERNAL MEDICINE

## 2018-12-04 PROCEDURE — 84484 ASSAY OF TROPONIN QUANT: CPT | Performed by: INTERNAL MEDICINE

## 2018-12-04 PROCEDURE — 83880 ASSAY OF NATRIURETIC PEPTIDE: CPT | Performed by: INTERNAL MEDICINE

## 2018-12-04 PROCEDURE — 93000 ELECTROCARDIOGRAM COMPLETE: CPT | Performed by: INTERNAL MEDICINE

## 2018-12-04 ASSESSMENT — PAIN SCALES - GENERAL: PAINLEVEL: MODERATE PAIN (5)

## 2018-12-04 ASSESSMENT — PATIENT HEALTH QUESTIONNAIRE - PHQ9: SUM OF ALL RESPONSES TO PHQ QUESTIONS 1-9: 10

## 2018-12-04 NOTE — MR AVS SNAPSHOT
After Visit Summary   12/4/2018    Marlon Mackey    MRN: [id number]           Patient Information     Date Of Birth          1965        Visit Information        Provider Department      12/4/2018 1:20 PM Frank Childs MD [facility]        Today's Diagnoses     Other chest pain    -  1    Superior vena cava syndrome, probable        Alcohol abuse        Alcoholic cardiomyopathy (H)        Need for prophylactic vaccination and inoculation against influenza        Need for prophylactic vaccination against Streptococcus pneumoniae (pneumococcus)           Follow-ups after your visit        Additional Services     CARDIOLOGY EVAL ADULT REFERRAL       Preferred location:  [facility] [phone]   [email or web address]    Please be aware that coverage of these services is subject to the terms and limitations of your health insurance plan.  Call member services at your health plan with any benefit or coverage questions.      Please bring the following to your appointment:  Any x-rays, CTs or MRIs which have been performed. Contact the facility where they were done to arrange for  prior to your scheduled appointment.    List of current medications  This referral request   Any documents/labs given to you for this referral                  Follow-up notes from your care team     Return in about 2 weeks (around 12/18/2018).      Future tests that were ordered for you today     Open Future Orders        Priority Expected Expires Ordered    CT Chest w Contrast Routine  12/4/2019 12/4/2018            Who to contact     If you have questions or need follow up information about today's clinic visit or your schedule please contact [facility] directly at [phone].  Normal or non-critical lab and imaging results will be communicated to you by [place]  "letter or phone within 4 business days after the clinic has received the results. If you do not hear from us within 7 days, please contact the clinic through [employer] or phone. If you have a critical or abnormal lab result, we will notify you by phone as soon as possible.  Submit refill requests through [employer] or call your pharmacy and they will forward the refill request to us. Please allow 3 business days for your refill to be completed.          Additional Information About Your Visit        Care EveryWhere ID     This is your Care EveryWhere ID. This could be used by other organizations to access your [facility] medical records  [id number]        Your Vitals Were     Pulse Temperature Respirations Height Pulse Oximetry BMI (Body Mass Index)    79 94.9  F (34.9  C) (Oral) 16 5' 10.5\" (1.791 m) 97% 25.46 kg/m2       Blood Pressure from Last 3 Encounters:   12/04/18 (!) 60/40   10/31/18 (!) 78/58   10/30/18 (!) 86/60    Weight from Last 3 Encounters:   12/04/18 180 lb (81.6 kg)   10/31/18 187 lb (84.8 kg)   10/30/18 176 lb (79.8 kg)              We Performed the Following     Alcohol ethyl     CARDIOLOGY EVAL ADULT REFERRAL     CBC with platelets and differential     Comprehensive metabolic panel (BMP + Alb, Alk Phos, ALT, AST, Total. Bili, TP)     EKG 12-lead complete w/read - Clinics     Troponin I     XR Chest 2 Views          Today's Medication Changes          These changes are accurate as of 12/4/18  2:07 PM.  If you have any questions, ask your nurse or doctor.               These medicines have changed or have updated prescriptions.        Dose/Directions    CARVEDILOL PO   This may have changed:  Another medication with the same name was removed. Continue taking this medication, and follow the directions you see here.   Changed by:  Frank Childs MD        Dose:  3.125 mg   Take 3.125 mg by mouth daily   Refills:  0       RAMIPRIL PO   This may have changed:  Another medication with the same name " was removed. Continue taking this medication, and follow the directions you see here.   Changed by:  Frank Childs MD        Dose:  2.5 mg   Take 2.5 mg by mouth daily   Refills:  0         Stop taking these medicines if you haven't already. Please contact your care team if you have questions.     bumetanide 1 MG tablet   Commonly known as:  BUMEX   Stopped by:  Frank Childs MD                    Primary Care Provider Office Phone # Fax #    Frank Childs [phone]       [id number]  [facility] [id number]        Goals        General    #1  Reducing Risks (pt-stated)     Notes - Note edited  11/1/2018  9:21 PM by Clark Guthrie RN    Goal Statement: I will decrease my smoking from 10 cigarettes to 8 cigarettes per day.  Measure of Success: patient statement   Supportive Steps to Achieve: explanation of rationale and the effects on his health.  Barriers: addiction to nicotine  Strengths: engaged in care coordination  Date to Achieve By: ongoing  Patient expressed understanding of goal: yes          Equal Access to Services     [facility] AH: Hadii chepe belcher hadasho Soomaali, waaxda luqadaha, qaybta kaalmada adeegyada, waxay julian yeager . So [facility] [phone].    ATENCIÓN: Si habla español, tiene a anderson disposición servicios gratuitos de asistencia lingüística. Llame al [phone].    We comply with applicable federal civil rights laws and [place] laws. We do not discriminate on the basis of race, color, national origin, age, disability, sex, sexual orientation, or gender identity.            Thank you!     Thank you for choosing [facility]  for your care. Our goal is always to provide you with excellent care. Hearing back from our patients is one way we can continue to improve our services. Please take a few minutes to complete the written survey that you may receive in the mail after your visit with us. Thank you!              Your Updated Medication List - Protect others around you: Learn how to safely use, store and throw away your medicines at www.disposemymeds.org.          This list is accurate as of 12/4/18  2:07 PM.  Always use your most recent med list.                   Brand Name Dispense Instructions for use Diagnosis    aspirin 81 MG EC tablet     30 tablet    Take 1 tablet (81 mg) by mouth daily    Hyperlipidemia LDL goal <100       atorvastatin 40 MG tablet    LIPITOR    30 tablet    Take 1 tablet (40 mg) by mouth daily    Hyperlipidemia LDL goal <100       CARVEDILOL PO      Take 3.125 mg by mouth daily        order for DME     1 each    Equipment being ordered: Scooter    Cardiomyopathy, alcoholic (H), Impaired mobility and ADLs       PANTOPRAZOLE SODIUM PO      Take 40 mg by mouth every morning (before breakfast)        QUEtiapine 50 MG tablet    SEROquel     Take by mouth daily        RAMIPRIL PO      Take 2.5 mg by mouth daily
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
## 2018-12-05 ENCOUNTER — TELEPHONE (OUTPATIENT)
Dept: FAMILY MEDICINE | Facility: CLINIC | Age: 53
End: 2018-12-05

## 2018-12-05 NOTE — LETTER
[address] [address]  Phone: [phone]      12/06/18        Marlon Key  [address]  [facility] [address]        Dear Bren Key,    Here are the instructions for you regarding your recent lab results and Dr. Childs's recommendations from your visit on 12/4/18:    1) Continue Carvediolol 3.125 mg tabs twice daily  2) Hold Furosemide, your blood pressure is too low  3) Hold Ramipril, again, your blood pressure is too low  4) Schedule CT scan of the chest for possible Superior Vena Cava Syndrome (suspicious because of your facial swelling). Please call [phone] to schedule this. Please complete this test within the next week.     Follow up with Dr. Childs in clinic in 2 weeks (around 12/18/18). Please call the clinic at [phone] to schedule an appointment.    If you have any questions about these instructions, please call the care team at [phone].    Sincerely,      Helen WARREN RN  [facility] Triage/ RN for Frank Childs MD

## 2018-12-05 NOTE — TELEPHONE ENCOUNTER
This writer attempted to contact Marlon on 12/05/18      Reason for call results and unable to leave message.      If patient calls back:   Patient contacted by a Registered Nurse. Inform patient that someone from the RN group will contact them, document that pt called and route to P DYAD 3 RN POOL [[id number]]    Notes Recorded by Frank Childs MD on 12/4/2018 at 8:19 PM  Despite complaints of chest pain, both Troponin I and white blood cell count are normal despite an abnormal EKG during his clinic visit.  Remind patient that his coronary angiography 6 months ago [facility] is normal.  Alcohol level is high (patient is intoxicated during his visit) due to continued alcohol abuse.  High proBNP level is due to alcoholic cardiomyopathy.    Plan:  1) Continue Carvedilol 3.125 mg BID.  2) Hold Furosemide to low BP.  3) Hold Ramipril to low BP.  4) Schedule CT of chest, as discussed during visit (patient has facial swelling that is suspicious for superior vena cava syndrome).    (Call patient)        Lesly Hayes RN

## 2018-12-05 NOTE — Clinical Note
TC, can you please print out the letter and mail it to patient at home address in chart. He requested written instructions so he wouldn't forget. Thank you!

## 2018-12-06 NOTE — TELEPHONE ENCOUNTER
Called and spoke with patient. Lab results reviewed and discussed with patient. Instructions per Dr. Childs given. Writer notes provider documentation below of patient being intoxicated during OV. Writer got impression that patient was intoxicated during phone conversation as well, didn't seem to be paying much attention to instructions. Patient asked if letter could be sent to home address with recommendations and phone number to schedule CT scan, as patient was laying down at home and did not want to get up to write instructions down, plus he thought he would forget. Letter created by  and sent to  to print out and mail. Patient instructed to call clinic with any further questions. Patient agreed.    Helen Kahn RN  [facility] Triage

## 2018-12-11 ENCOUNTER — TELEPHONE (OUTPATIENT)
Dept: INTERNAL MEDICINE | Facility: CLINIC | Age: 53
End: 2018-12-11

## 2018-12-11 DIAGNOSIS — I42.8 NONISCHEMIC CARDIOMYOPATHY (H): Primary | ICD-10-CM

## 2018-12-11 RX ORDER — RAMIPRIL 2.5 MG/1
2.5 CAPSULE ORAL DAILY
Qty: 90 CAPSULE | Refills: 3 | Status: SHIPPED | OUTPATIENT
Start: 2018-12-11 | End: 2019-11-15

## 2018-12-11 RX ORDER — CARVEDILOL 3.12 MG/1
3.12 TABLET ORAL DAILY
Qty: 90 TABLET | Refills: 3 | Status: SHIPPED | OUTPATIENT
Start: 2018-12-11 | End: 2019-01-31

## 2018-12-11 NOTE — TELEPHONE ENCOUNTER
Unsure of the patient understood anything that was discussed on phone call. Reviewed the letter that was sent to the patient multiple times. Patient notes that Selam states he has received multiple Rx's of carvedilol. Patient states that he can not find it.    He wants all medications to go through the [place] mail order pharmacy.    Provider to address the carvedilol. Unable to silke up medication.    Lesly Hayes RN, [facility] Triage

## 2018-12-11 NOTE — TELEPHONE ENCOUNTER
Reason for Call:  Other     Detailed comments: Patient received a letter dated 12/6/2018,  and he is stuck trying to figure things out and would like someone to call him to go over things.    Phone Number Patient can be reached at: Home number on file [phone] (home)    Best Time: any    Can we leave a detailed message on this number? YES    Call taken on 12/11/2018 at 2:33 PM by Saloni Posey

## 2018-12-20 ENCOUNTER — PATIENT OUTREACH (OUTPATIENT)
Dept: CARE COORDINATION | Facility: CLINIC | Age: 53
End: 2018-12-20

## 2018-12-20 ASSESSMENT — ACTIVITIES OF DAILY LIVING (ADL): DEPENDENT_IADLS:: TRANSPORTATION;CLEANING;LAUNDRY

## 2018-12-20 NOTE — PROGRESS NOTES
Clinic Care Coordination Contact  Care Team Conversations    RN CC attempted to contact patient X2 with no answer.  Noted patient had an appointment with PCP scheduled for today, but this was rescheduled on 12/19/18 for 12/24/18.  Noted triage calls to patient in 12/5/18 and 12/11/18 encounter with concern regarding alcohol intoxication and patient's ability to understand his instructions from PCP.  Noted patient's blood pressure readings during 12/4/18 office visit were 57/40 and 60/40 and alcohol level was 0.29.    RN CC attempted to contact patient's emergency contact, however, there was no answer.  RN CC contacted local law enforcement and requested a welfare check.  RN ANAHI received a call back from police officers stating patient was intoxicated, but not intoxicated enough to be brought in.  The police stated they would have the patient contact writer.    Melissa Behl BSN, RN, N  [facility] Care Coordinator  [phone]
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
## 2018-12-24 ENCOUNTER — OFFICE VISIT (OUTPATIENT)
Dept: FAMILY MEDICINE | Facility: CLINIC | Age: 53
End: 2018-12-24
Payer: COMMERCIAL

## 2018-12-24 VITALS
WEIGHT: 178 LBS | TEMPERATURE: 98.2 F | SYSTOLIC BLOOD PRESSURE: 98 MMHG | BODY MASS INDEX: 24.92 KG/M2 | HEART RATE: 156 BPM | RESPIRATION RATE: 18 BRPM | DIASTOLIC BLOOD PRESSURE: 54 MMHG | HEIGHT: 71 IN | OXYGEN SATURATION: 100 %

## 2018-12-24 DIAGNOSIS — I42.8 NONISCHEMIC CARDIOMYOPATHY (H): ICD-10-CM

## 2018-12-24 DIAGNOSIS — R94.31 ABNORMAL ELECTROCARDIOGRAM: ICD-10-CM

## 2018-12-24 DIAGNOSIS — K59.09 OTHER CONSTIPATION: ICD-10-CM

## 2018-12-24 DIAGNOSIS — K64.4 EXTERNAL HEMORRHOIDS: ICD-10-CM

## 2018-12-24 DIAGNOSIS — R07.89 ATYPICAL CHEST PAIN: Primary | ICD-10-CM

## 2018-12-24 LAB
ALBUMIN SERPL-MCNC: 2.5 G/DL (ref 3.4–5)
ALP SERPL-CCNC: 359 U/L (ref 40–150)
ALT SERPL W P-5'-P-CCNC: 124 U/L (ref 0–70)
ANION GAP SERPL CALCULATED.3IONS-SCNC: 9 MMOL/L (ref 3–14)
ANISOCYTOSIS BLD QL SMEAR: SLIGHT
AST SERPL W P-5'-P-CCNC: 335 U/L (ref 0–45)
BILIRUB SERPL-MCNC: 3.7 MG/DL (ref 0.2–1.3)
BLASTS # BLD: 0.1 10E9/L
BLASTS BLD QL SMEAR: 1 %
BUN SERPL-MCNC: 13 MG/DL (ref 7–30)
CALCIUM SERPL-MCNC: 8.1 MG/DL (ref 8.5–10.1)
CHLORIDE SERPL-SCNC: 95 MMOL/L (ref 94–109)
CO2 SERPL-SCNC: 26 MMOL/L (ref 20–32)
CREAT SERPL-MCNC: 1.06 MG/DL (ref 0.66–1.25)
DACRYOCYTES BLD QL SMEAR: SLIGHT
DIFFERENTIAL METHOD BLD: ABNORMAL
ERYTHROCYTE [DISTWIDTH] IN BLOOD BY AUTOMATED COUNT: 16.6 % (ref 10–15)
ETHANOL SERPL-MCNC: <0.01 G/DL
GFR SERPL CREATININE-BSD FRML MDRD: 79 ML/MIN/{1.73_M2}
GLUCOSE SERPL-MCNC: 126 MG/DL (ref 70–99)
HCT VFR BLD AUTO: 34.4 % (ref 40–53)
HGB BLD-MCNC: 12.7 G/DL (ref 13.3–17.7)
LYMPHOCYTES # BLD AUTO: 0.6 10E9/L (ref 0.8–5.3)
LYMPHOCYTES NFR BLD AUTO: 8 %
MCH RBC QN AUTO: 37 PG (ref 26.5–33)
MCHC RBC AUTO-ENTMCNC: 36.9 G/DL (ref 31.5–36.5)
MCV RBC AUTO: 100 FL (ref 78–100)
MONOCYTES # BLD AUTO: 0.4 10E9/L (ref 0–1.3)
MONOCYTES NFR BLD AUTO: 5 %
NEUTROPHILS # BLD AUTO: 6 10E9/L (ref 1.6–8.3)
NEUTROPHILS NFR BLD AUTO: 86 %
NEUTS HYPERSEG BLD QL SMEAR: PRESENT
NT-PROBNP SERPL-MCNC: 7917 PG/ML (ref 0–125)
PLATELET # BLD AUTO: 56 10E9/L (ref 150–450)
POIKILOCYTOSIS BLD QL SMEAR: SLIGHT
POTASSIUM SERPL-SCNC: 4 MMOL/L (ref 3.4–5.3)
PROT SERPL-MCNC: 6.6 G/DL (ref 6.8–8.8)
RBC # BLD AUTO: 3.43 10E12/L (ref 4.4–5.9)
SODIUM SERPL-SCNC: 130 MMOL/L (ref 133–144)
TARGETS BLD QL SMEAR: SLIGHT
TROPONIN I SERPL-MCNC: 0.03 UG/L (ref 0–0.04)
WBC # BLD AUTO: 7.1 10E9/L (ref 4–11)

## 2018-12-24 PROCEDURE — 84484 ASSAY OF TROPONIN QUANT: CPT | Performed by: INTERNAL MEDICINE

## 2018-12-24 PROCEDURE — 99214 OFFICE O/P EST MOD 30 MIN: CPT | Performed by: INTERNAL MEDICINE

## 2018-12-24 PROCEDURE — 83880 ASSAY OF NATRIURETIC PEPTIDE: CPT | Performed by: INTERNAL MEDICINE

## 2018-12-24 PROCEDURE — 80053 COMPREHEN METABOLIC PANEL: CPT | Performed by: INTERNAL MEDICINE

## 2018-12-24 PROCEDURE — 36415 COLL VENOUS BLD VENIPUNCTURE: CPT | Performed by: INTERNAL MEDICINE

## 2018-12-24 PROCEDURE — 80320 DRUG SCREEN QUANTALCOHOLS: CPT | Performed by: INTERNAL MEDICINE

## 2018-12-24 PROCEDURE — 93000 ELECTROCARDIOGRAM COMPLETE: CPT | Performed by: INTERNAL MEDICINE

## 2018-12-24 PROCEDURE — 85025 COMPLETE CBC W/AUTO DIFF WBC: CPT | Performed by: INTERNAL MEDICINE

## 2018-12-24 RX ORDER — SPIRONOLACTONE 25 MG/1
25 TABLET ORAL 2 TIMES DAILY
COMMUNITY
Start: 2018-12-16 | End: 2019-02-19

## 2018-12-24 RX ORDER — LACTULOSE 10 G/15ML
30 SOLUTION ORAL DAILY
Qty: 473 ML | Refills: 11 | Status: SHIPPED | OUTPATIENT
Start: 2018-12-24 | End: 2019-01-31 | Stop reason: SINTOL

## 2018-12-24 RX ORDER — ASPIRIN 81 MG
TABLET, DELAYED RELEASE (ENTERIC COATED) ORAL
COMMUNITY
Start: 2018-12-11 | End: 2019-01-31

## 2018-12-24 RX ORDER — AMITRIPTYLINE HYDROCHLORIDE 50 MG/1
TABLET ORAL
COMMUNITY
Start: 2018-12-09 | End: 2019-01-31

## 2018-12-24 RX ORDER — SERTRALINE HYDROCHLORIDE 100 MG/1
TABLET, FILM COATED ORAL
COMMUNITY
Start: 2018-12-09 | End: 2019-01-31

## 2018-12-24 RX ORDER — NICOTINE 21 MG/24HR
PATCH, TRANSDERMAL 24 HOURS TRANSDERMAL
COMMUNITY
Start: 2018-12-09 | End: 2019-01-31

## 2018-12-24 RX ORDER — BUMETANIDE 1 MG/1
1 TABLET ORAL 2 TIMES DAILY
COMMUNITY
Start: 2018-12-16 | End: 2019-01-31

## 2018-12-24 RX ORDER — GABAPENTIN 400 MG/1
CAPSULE ORAL
COMMUNITY
Start: 2018-12-09 | End: 2019-01-31

## 2018-12-24 RX ORDER — DOXAZOSIN 2 MG/1
TABLET ORAL
COMMUNITY
Start: 2018-12-09 | End: 2019-01-31

## 2018-12-24 ASSESSMENT — PAIN SCALES - GENERAL: PAINLEVEL: MILD PAIN (2)

## 2018-12-24 ASSESSMENT — MIFFLIN-ST. JEOR: SCORE: 1661.59
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
## 2018-12-24 NOTE — PATIENT INSTRUCTIONS
At [facility], we strive to deliver an exceptional experience to you, every time we see you.  If you receive a survey in the mail, please send us back your thoughts. We really do value your feedback.    Based on your medical history, these are the current health maintenance/preventive care services that you are due for (some may have been done at this visit.)  Health Maintenance Due   Topic Date Due     HF ACTION PLAN Q3 YR  1965     DEPRESSION ACTION PLAN  09/05/1982     HIV SCREEN (SYSTEM ASSIGNED)  09/05/1982     HEPATITIS C SCREENING  09/05/1982     COLON CANCER SCREEN (SYSTEM ASSIGNED)  09/05/2014     ZOSTER IMMUNIZATION (1 of 2) 09/05/2014     INFLUENZA VACCINE (1) 09/01/2018         Suggested websites for health information:  Www.[employer] : Up to date and easily searchable information on multiple topics.  Www.[employer].gov : medication info, interactive tutorials, watch real surgeries online  Www.familydoctor.org : good info from the Academy of Family Physicians  Www.cdc.gov : public health info, travel advisories, epidemics (H1N1)  Www.aap.org : children's health info, normal development, vaccinations  Www.health.[facility].[place].us : [place] dept of health, public health issues in [place], N1N1    Your care team:                            Family Medicine Internal Medicine   MD Frank Barkley MD Shantel Branch-Fleming, MD Katya Georgiev PA-C Nam Ho, MD Pediatrics   CHRISTY Aranda, MD Hyacinth Alonso CNP, MD Deborah Mielke, MD Kim Thein, APRN CNP      Clinic hours: Monday - Thursday 7 am-7 pm; Fridays 7 am-5 pm.   Urgent care: Monday - Friday 11 am-9 pm; Saturday and Sunday 9 am-5 pm.  Pharmacy : Monday -Thursday 8 am-8 pm; Friday 8 am-6 pm; Saturday and Sunday 9 am-5 pm.     Clinic: [phone]   Pharmacy: [phone]

## 2018-12-26 ENCOUNTER — PATIENT OUTREACH (OUTPATIENT)
Dept: CARE COORDINATION | Facility: CLINIC | Age: 53
End: 2018-12-26

## 2018-12-26 ENCOUNTER — TELEPHONE (OUTPATIENT)
Dept: INTERNAL MEDICINE | Facility: CLINIC | Age: 53
End: 2018-12-26

## 2018-12-26 ASSESSMENT — ACTIVITIES OF DAILY LIVING (ADL): DEPENDENT_IADLS:: TRANSPORTATION;CLEANING;LAUNDRY

## 2018-12-26 NOTE — PROGRESS NOTES
Clinic Care Coordination Contact  Outreach    Call placed per referral from CC/RN.      Attempted to talk with pt about his alcohol use. Pt denied any concerns with his use and denied any need for support, resources, CD treatment.     Pt declined any needs when asked.  He did say he had a question yet could not remember what the question was.  He does have Melissa Behl, BRUCE/CC phone number and will call her if he thinks of the question.  Encouraged pt to let Lesly know if he ever changed his mind about any CD resources. Pt again denied he had any concerns with his drinking.     Plan:  Pt to call Lesly if he remembers his question.  Pt to let RN or clinic know if he changes his mind about CD resources.  At this time no planned outreaches from .     DEBBIE Bateman, Clinic Care Coordinator 12/26/2018   10:42 AM  [phone]

## 2018-12-26 NOTE — TELEPHONE ENCOUNTER
Patient is requesting his Pantoprazole to be filled. Please send a new order to [facility]. Thank you.  VELASQUEZ Kerr.Ph.   [facility]  [phone]

## 2019-01-15 ENCOUNTER — PATIENT OUTREACH (OUTPATIENT)
Dept: CARE COORDINATION | Facility: CLINIC | Age: 54
End: 2019-01-15

## 2019-01-15 ASSESSMENT — ACTIVITIES OF DAILY LIVING (ADL): DEPENDENT_IADLS:: TRANSPORTATION;CLEANING;LAUNDRY

## 2019-01-15 NOTE — PROGRESS NOTES
Clinic Care Coordination Contact  [facility]/Voicemail    Referral Source: PCP  Clinical Data: Care Coordinator Outreach  Outreach attempted x 1.  Left message on voicemail with call back information and requested return call.  Plan: Care Coordinator mailed out care coordination introduction letter on 11/2/18. Care Coordinator will try to reach patient again in 3-5 business days.    Melissa Behl BSN, RN, N  [facility] Care Coordinator  [phone]

## 2019-01-22 DIAGNOSIS — I42.6 CARDIOMYOPATHY, ALCOHOLIC (H): ICD-10-CM

## 2019-01-22 NOTE — TELEPHONE ENCOUNTER
"Last sent to [facility], [facility]am is now requesting this:  carvedilol (COREG) 3.125 MG tablet 90 tablet 3 12/11/2018     Requested Prescriptions   Pending Prescriptions Disp Refills     carvedilol (COREG) 3.125 MG tablet [Pharmacy Med Name: CARVEDILOL 3.125MG TABLETS] 60 tablet 0     Sig: TAKE 1 TABLET BY MOUTH TWICE DAILY WITH MEALS    Beta-Blockers Protocol Passed - 1/22/2019  8:40 AM       Passed - Blood pressure under 140/90 in past 12 months    BP Readings from Last 3 Encounters:   12/24/18 98/54   12/04/18 (!) 60/40   10/31/18 (!) 78/58                Passed - Patient is age 6 or older       Passed - Recent (12 mo) or future (30 days) visit within the authorizing provider's specialty    Patient had office visit in the last 12 months or has a visit in the next 30 days with authorizing provider or within the authorizing provider's specialty.  See \"Patient Info\" tab in inbasket, or \"Choose Columns\" in Meds & Orders section of the refill encounter.             Passed - Medication is active on med list        bumetanide (BUMEX) 1 MG tablet [Pharmacy Med Name: BUMETANIDE 1MG TABLETS] 60 tablet 0            Last Written Prescription Date:  na  Last Fill Quantity: na,   # refills: na  Last Office Visit: 12/24/18-Vocal  Future Office visit:       Routing refill request to provider for review/approval because:  Medication is reported/historical Sig: TAKE 1 TABLET BY MOUTH TWICE DAILY, TAKE WITH CARVEDILOL AND RAMIPRIL    Diuretics (Including Combos) Protocol Failed - 1/22/2019  8:40 AM       Failed - Normal serum sodium on file in past 12 months    Recent Labs   Lab Test 12/24/18  1017   *             Passed - Blood pressure under 140/90 in past 12 months    BP Readings from Last 3 Encounters:   12/24/18 98/54   12/04/18 (!) 60/40   10/31/18 (!) 78/58                Passed - Recent (12 mo) or future (30 days) visit within the authorizing provider's specialty    Patient had office visit in the " "last 12 months or has a visit in the next 30 days with authorizing provider or within the authorizing provider's specialty.  See \"Patient Info\" tab in inbasket, or \"Choose Columns\" in Meds & Orders section of the refill encounter.             Passed - Medication is active on med list       Passed - Patient is age 18 or older       Passed - Normal serum creatinine on file in past 12 months    Recent Labs   Lab Test 12/24/18  1017   CR 1.06             Passed - Normal serum potassium on file in past 12 months    Recent Labs   Lab Test 12/24/18  1017   POTASSIUM 4.0                      "

## 2019-01-24 RX ORDER — BUMETANIDE 1 MG/1
TABLET ORAL
Qty: 60 TABLET | Refills: 11 | Status: SHIPPED | OUTPATIENT
Start: 2019-01-24 | End: 2019-01-31

## 2019-01-24 RX ORDER — CARVEDILOL 3.12 MG/1
TABLET ORAL
Qty: 60 TABLET | Refills: 11 | Status: SHIPPED | OUTPATIENT
Start: 2019-01-24 | End: 2019-06-10

## 2019-01-24 NOTE — TELEPHONE ENCOUNTER
Reason for Call:  Other prescription    Detailed comments: Pt calling to follow up on his medication requests and would like a call back for an update as to when they will be sent.     Phone Number Patient can be reached at: Home number on file [phone] (home)    Best Time: anytime    Can we leave a detailed message on this number? YES    Call taken on 1/24/2019 at 2:47 PM by Jose Rafael Browne

## 2019-01-25 ENCOUNTER — OFFICE VISIT (OUTPATIENT)
Dept: FAMILY MEDICINE | Facility: CLINIC | Age: 54
End: 2019-01-25
Payer: COMMERCIAL

## 2019-01-25 VITALS
BODY MASS INDEX: 25.75 KG/M2 | SYSTOLIC BLOOD PRESSURE: 119 MMHG | RESPIRATION RATE: 18 BRPM | OXYGEN SATURATION: 100 % | WEIGHT: 182 LBS | TEMPERATURE: 98 F | DIASTOLIC BLOOD PRESSURE: 73 MMHG | HEART RATE: 107 BPM

## 2019-01-25 DIAGNOSIS — R34 DECREASED URINE OUTPUT: ICD-10-CM

## 2019-01-25 DIAGNOSIS — R14.0 DISTENDED ABDOMEN: ICD-10-CM

## 2019-01-25 DIAGNOSIS — R19.7 DIARRHEA, UNSPECIFIED TYPE: Primary | ICD-10-CM

## 2019-01-25 PROCEDURE — 99213 OFFICE O/P EST LOW 20 MIN: CPT | Performed by: NURSE PRACTITIONER

## 2019-01-25 ASSESSMENT — PAIN SCALES - GENERAL: PAINLEVEL: NO PAIN (0)
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
## 2019-01-25 NOTE — PATIENT INSTRUCTIONS
At [facility], we strive to deliver an exceptional experience to you, every time we see you.  If you receive a survey in the mail, please send us back your thoughts. We really do value your feedback.    Based on your medical history, these are the current health maintenance/preventive care services that you are due for (some may have been done at this visit.)  Health Maintenance Due   Topic Date Due     TOBACCO CESSATION COUNSELING Q1 YR  1965     HF ACTION PLAN Q3 YR  1965     DEPRESSION ACTION PLAN  09/05/1982     HIV SCREEN (SYSTEM ASSIGNED)  09/05/1982     HEPATITIS C SCREENING  09/05/1982     COLON CANCER SCREEN (SYSTEM ASSIGNED)  09/05/2014     ZOSTER IMMUNIZATION (1 of 2) 09/05/2014     INFLUENZA VACCINE (1) 09/01/2018         Suggested websites for health information:  Www.GreenLight.DrFirst : Up to date and easily searchable information on multiple topics.  Www."[employer]".gov : medication info, interactive tutorials, watch real surgeries online  Www.familydoctor.org : good info from the Academy of Family Physicians  Www.cdc.gov : public health info, travel advisories, epidemics (H1N1)  Www.aap.org : children's health info, normal development, vaccinations  Www.health.[facility].[place].us : [place] dept of health, public health issues in [place], N1N1    Your care team:                            Family Medicine Internal Medicine   MD Frank Barkley MD Shantel Branch-Fleming, MD Katya Georgiev PA-C Nam Ho, MD Pediatrics   CHRISTY Aranda, MD Hyacinth Alonso CNP, MD Deborah Mielke, MD Kim Thein, APR[facility]      Clinic hours: Monday - Thursday 7 am-7 pm; Fridays 7 am-5 pm.   Urgent care: Monday - Friday 11 am-9 pm; Saturday and Sunday 9 am-5 pm.  Pharmacy : Monday -Thursday 8 am-8 pm; Friday 8 am-6 pm; Saturday and Sunday 9 am-5 pm.     Clinic: [phone]   Pharmacy: [phone]    HOW TO QUIT  SMOKING  Smoking is one of the hardest habits to break. About half of all those who have ever smoked have been able to quit, and most of those (about 70%) who still smoke want to quit. Here are some of the best ways to stop smoking.     KEEP TRYING:  It takes most smokers about 8 tries before they are finally able to fully quit. So, the more often you try and fail, the better your chance of quitting the next time! So, don't give up!    GO COLD TURKEY:  Most ex-smokers quit cold turkey. Trying to cut back gradually doesn't seem to work as well, perhaps because it continues the smoking habit. Also, it is possible to fool yourself by inhaling more while smoking fewer cigarettes. This results in the same amount of nicotine in your body!    GET SUPPORT:  Support programs can make an important difference, especially for the heavy smoker. These groups offer lectures, methods to change your behavior and peer support. Call the free national Quitline for more information. 800-QUIT-NOW (722-669-6966). Low-cost or free programs are offered by many hospitals, local chapters of the American Lung Association (998-133-9480) and the American Cancer Society (262-041-4800). Support at home is important too. Non-smokers can help by offering praise and encouragement. If the smoker fails to quit, encourage them to try again!    OVER-THE-COUNTER MEDICINES:  For those who can't quit on their own, Nicotine Replacement Therapy (NRT) may make quitting much easier. Certain aids such as the nicotine patch, gum and lozenge are available without a prescription. However, it is best to use these under the guidance of your doctor. The skin patch provides a steady supply of nicotine to the body. Nicotine gum and lozenge gives temporary bursts of low levels of nicotine. Both methods take the edge off the craving for cigarettes. WARNING: If you feel symptoms of nicotine overdose, such as nausea, vomiting, dizziness, weakness, or fast heartbeat, stop  using these and see your doctor.    PRESCRIPTION MEDICINES:  After evaluating your smoking patterns and prior attempts at quitting, your doctor may offer a prescription medicine such as bupropion (Zyban, Wellbutrin), varenicline (Chantix, Champix), a niocotine inhaler or nasal spray. Each has its unique advantage and side effects which your doctor can review with you.    HEALTH BENEFITS OF QUITTING:  The benefits of quitting start right away and keep improving the longer you go without smokin minutes: blood pressure and pulse return to normal  8 hours: oxygen levels return to normal  2 days: ability to smell and taste begins to improve as damaged nerves start to regrow  2-3 weeks: circulation and lung function improves  1-9 months: decreased cough, congestion and shortness of breath; less tired  1 year: risk of heart attack decreases by half  5 years: risk of lung cancer decreases by half; risk of stroke becomes the same as a non-smoker  For information about how to quit smoking, visit the following links:  National Cancer [place] ,   Clearing the Air, Quit Smoking Today   - an online booklet. http://www.smokefree.gov/pubs/clearing_the_air.pdf  Smokefree.gov http://smokefree.gov/  QuitNet http://www.quitnet.com/    8159-9201 [facility], [address] [place] [address], [place] [address]. All rights reserved. This information is not intended as a substitute for professional medical care. Always follow your healthcare professional's instructions.    The Benefits of Living Smoke Free  What do you want to gain from quitting? Check off some reasons to quit.  Health Benefits  ___ Reduce my risk of lung cancer, heart disease, chronic lung disease  ___ Have fewer wrinkles and softer skin  ___ Improve my sense of taste and smell  ___ For pregnant women--reduce the risk of having a miscarriage, stillbirth, premature birth, or low-birth-weight baby  Personal Benefits  ___ Feel more in control of my life  ___ Have  better-smelling hair, breath, clothes, home, and car  ___ Save time by not having to take smoke breaks, buy cigarettes, or hunt for a light  ___ Have whiter teeth  Family Benefits  ___ Reduce my children s respiratory tract infections  ___ Set a good example for my children  ___ Reduce my family s cancer risk  Financial Benefits  ___ Save hundreds of dollars each year that would be spent on cigarettes  ___ Save money on medical bills  ___ Save on life, health, and car insurance premiums    Those Dollars Add Up!  Cigarettes are expensive, and getting more expensive all the time. Do you realize how much money you are spending on cigarettes per year? What is the average amount you spend on a pack of cigarettes? What is the average number of packs that you smoke per day? Using your answers to these questions, fill in this formula to help you find out:  ($ _____ per pack) ×  ( _____ number of packs per day) × (365 days) =  $ _____ yearly cost of smoking  Besides tobacco, there are other costs, including extra cleaning bills and replacement costs for clothing and furniture; medical expenses for smoking-related illnesses; and higher health, life, and car insurance premiums.    Cigars and Pipes Count Too!  Cigars and pipes are also dangerous. So are smokeless (chewing) tobacco and snuff. All of these products contain nicotine, a highly addictive substance that has harmful effects on your body. Quitting smoking means giving up all tobacco products.      4450-5593 [facility], [address] [place] [address], [place] [address]. All rights reserved. This information is not intended as a substitute for professional medical care. Always follow your healthcare professional's instructions.

## 2019-01-31 ENCOUNTER — PATIENT OUTREACH (OUTPATIENT)
Dept: CARE COORDINATION | Facility: CLINIC | Age: 54
End: 2019-01-31

## 2019-01-31 ENCOUNTER — OFFICE VISIT (OUTPATIENT)
Dept: FAMILY MEDICINE | Facility: CLINIC | Age: 54
End: 2019-01-31
Payer: COMMERCIAL

## 2019-01-31 VITALS
DIASTOLIC BLOOD PRESSURE: 64 MMHG | SYSTOLIC BLOOD PRESSURE: 95 MMHG | BODY MASS INDEX: 25.2 KG/M2 | WEIGHT: 180 LBS | RESPIRATION RATE: 18 BRPM | HEIGHT: 71 IN | HEART RATE: 55 BPM | OXYGEN SATURATION: 98 % | TEMPERATURE: 97.4 F

## 2019-01-31 DIAGNOSIS — I42.8 NONISCHEMIC CARDIOMYOPATHY (H): ICD-10-CM

## 2019-01-31 DIAGNOSIS — R19.5 POSITIVE FIT (FECAL IMMUNOCHEMICAL TEST): ICD-10-CM

## 2019-01-31 DIAGNOSIS — F10.10 ALCOHOL ABUSE: ICD-10-CM

## 2019-01-31 DIAGNOSIS — K70.31 ALCOHOLIC CIRRHOSIS OF LIVER WITH ASCITES (H): ICD-10-CM

## 2019-01-31 DIAGNOSIS — Z12.11 SCREEN FOR COLON CANCER: ICD-10-CM

## 2019-01-31 DIAGNOSIS — R19.7 DIARRHEA, UNSPECIFIED TYPE: Primary | ICD-10-CM

## 2019-01-31 LAB
ALBUMIN SERPL-MCNC: 2.4 G/DL (ref 3.4–5)
ALP SERPL-CCNC: 149 U/L (ref 40–150)
ALT SERPL W P-5'-P-CCNC: 41 U/L (ref 0–70)
ANION GAP SERPL CALCULATED.3IONS-SCNC: 8 MMOL/L (ref 3–14)
AST SERPL W P-5'-P-CCNC: 65 U/L (ref 0–45)
BASOPHILS # BLD AUTO: 0.1 10E9/L (ref 0–0.2)
BASOPHILS NFR BLD AUTO: 1 %
BILIRUB SERPL-MCNC: 1.3 MG/DL (ref 0.2–1.3)
BUN SERPL-MCNC: 10 MG/DL (ref 7–30)
C COLI+JEJUNI+LARI FUSA STL QL NAA+PROBE: NOT DETECTED
CALCIUM SERPL-MCNC: 7.6 MG/DL (ref 8.5–10.1)
CHLORIDE SERPL-SCNC: 103 MMOL/L (ref 94–109)
CO2 SERPL-SCNC: 29 MMOL/L (ref 20–32)
CREAT SERPL-MCNC: 0.75 MG/DL (ref 0.66–1.25)
DIFFERENTIAL METHOD BLD: ABNORMAL
EC STX1 GENE STL QL NAA+PROBE: NOT DETECTED
EC STX2 GENE STL QL NAA+PROBE: NOT DETECTED
ENTERIC PATHOGEN COMMENT: NORMAL
EOSINOPHIL # BLD AUTO: 0.1 10E9/L (ref 0–0.7)
EOSINOPHIL NFR BLD AUTO: 1.6 %
ERYTHROCYTE [DISTWIDTH] IN BLOOD BY AUTOMATED COUNT: 14.2 % (ref 10–15)
ETHANOL SERPL-MCNC: 0.04 G/DL
GFR SERPL CREATININE-BSD FRML MDRD: >90 ML/MIN/{1.73_M2}
GLUCOSE SERPL-MCNC: 86 MG/DL (ref 70–99)
HCT VFR BLD AUTO: 28.6 % (ref 40–53)
HEMOCCULT STL QL IA: POSITIVE
HGB BLD-MCNC: 9.8 G/DL (ref 13.3–17.7)
LYMPHOCYTES # BLD AUTO: 1.4 10E9/L (ref 0.8–5.3)
LYMPHOCYTES NFR BLD AUTO: 21 %
MCH RBC QN AUTO: 38.9 PG (ref 26.5–33)
MCHC RBC AUTO-ENTMCNC: 34.3 G/DL (ref 31.5–36.5)
MCV RBC AUTO: 114 FL (ref 78–100)
MONOCYTES # BLD AUTO: 1.2 10E9/L (ref 0–1.3)
MONOCYTES NFR BLD AUTO: 18 %
NEUTROPHILS # BLD AUTO: 4 10E9/L (ref 1.6–8.3)
NEUTROPHILS NFR BLD AUTO: 58.4 %
NOROV GI+II ORF1-ORF2 JNC STL QL NAA+PR: NOT DETECTED
NT-PROBNP SERPL-MCNC: 887 PG/ML (ref 0–125)
PLATELET # BLD AUTO: 241 10E9/L (ref 150–450)
POTASSIUM SERPL-SCNC: 3.7 MMOL/L (ref 3.4–5.3)
PROT SERPL-MCNC: 6.5 G/DL (ref 6.8–8.8)
RBC # BLD AUTO: 2.52 10E12/L (ref 4.4–5.9)
RVA NSP5 STL QL NAA+PROBE: NOT DETECTED
SALMONELLA SP RPOD STL QL NAA+PROBE: NOT DETECTED
SHIGELLA SP+EIEC IPAH STL QL NAA+PROBE: NOT DETECTED
SODIUM SERPL-SCNC: 140 MMOL/L (ref 133–144)
V CHOL+PARA RFBL+TRKH+TNAA STL QL NAA+PR: NOT DETECTED
WBC # BLD AUTO: 6.8 10E9/L (ref 4–11)
Y ENTERO RECN STL QL NAA+PROBE: NOT DETECTED

## 2019-01-31 PROCEDURE — 85025 COMPLETE CBC W/AUTO DIFF WBC: CPT | Performed by: INTERNAL MEDICINE

## 2019-01-31 PROCEDURE — 83516 IMMUNOASSAY NONANTIBODY: CPT | Mod: 91 | Performed by: INTERNAL MEDICINE

## 2019-01-31 PROCEDURE — 87506 IADNA-DNA/RNA PROBE TQ 6-11: CPT | Performed by: INTERNAL MEDICINE

## 2019-01-31 PROCEDURE — 80320 DRUG SCREEN QUANTALCOHOLS: CPT | Performed by: INTERNAL MEDICINE

## 2019-01-31 PROCEDURE — 83880 ASSAY OF NATRIURETIC PEPTIDE: CPT | Performed by: INTERNAL MEDICINE

## 2019-01-31 PROCEDURE — 80053 COMPREHEN METABOLIC PANEL: CPT | Performed by: INTERNAL MEDICINE

## 2019-01-31 PROCEDURE — 36415 COLL VENOUS BLD VENIPUNCTURE: CPT | Performed by: INTERNAL MEDICINE

## 2019-01-31 PROCEDURE — 99214 OFFICE O/P EST MOD 30 MIN: CPT | Performed by: INTERNAL MEDICINE

## 2019-01-31 PROCEDURE — 83993 ASSAY FOR CALPROTECTIN FECAL: CPT | Performed by: INTERNAL MEDICINE

## 2019-01-31 PROCEDURE — 86709 HEPATITIS A IGM ANTIBODY: CPT | Performed by: INTERNAL MEDICINE

## 2019-01-31 PROCEDURE — 82274 ASSAY TEST FOR BLOOD FECAL: CPT | Performed by: INTERNAL MEDICINE

## 2019-01-31 RX ORDER — BISMUTH SUBSALICYLATE 262 MG/1
1 TABLET, CHEWABLE ORAL EVERY 4 HOURS PRN
Qty: 60 TABLET | Refills: 11 | Status: SHIPPED | OUTPATIENT
Start: 2019-01-31 | End: 2019-02-19

## 2019-01-31 RX ORDER — BUMETANIDE 1 MG/1
1 TABLET ORAL DAILY
COMMUNITY
End: 2020-03-13

## 2019-01-31 ASSESSMENT — MIFFLIN-ST. JEOR: SCORE: 1670.66

## 2019-01-31 ASSESSMENT — PAIN SCALES - GENERAL: PAINLEVEL: NO PAIN (0)

## 2019-01-31 ASSESSMENT — ACTIVITIES OF DAILY LIVING (ADL): DEPENDENT_IADLS:: TRANSPORTATION;CLEANING;LAUNDRY
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
## 2019-01-31 NOTE — PATIENT INSTRUCTIONS
At [facility], we strive to deliver an exceptional experience to you, every time we see you.  If you receive a survey in the mail, please send us back your thoughts. We really do value your feedback.    Based on your medical history, these are the current health maintenance/preventive care services that you are due for (some may have been done at this visit.)  Health Maintenance Due   Topic Date Due     TOBACCO CESSATION COUNSELING Q1 YR  1965     HF ACTION PLAN Q3 YR  1965     DEPRESSION ACTION PLAN  09/05/1982     HIV SCREEN (SYSTEM ASSIGNED)  09/05/1982     HEPATITIS C SCREENING  09/05/1982     COLON CANCER SCREEN (SYSTEM ASSIGNED)  09/05/2014     ZOSTER IMMUNIZATION (1 of 2) 09/05/2014     INFLUENZA VACCINE (1) 09/01/2018         Suggested websites for health information:  Www.[employer] : Up to date and easily searchable information on multiple topics.  Www.[employer].gov : medication info, interactive tutorials, watch real surgeries online  Www.familydoctor.org : good info from the Academy of Family Physicians  Www.cdc.gov : public health info, travel advisories, epidemics (H1N1)  Www.aap.org : children's health info, normal development, vaccinations  Www.health.[facility].[place].us : [place] dept of health, public health issues in [place], N1N1    Your care team:                            Family Medicine Internal Medicine   MD Frank Barkley MD Shantel Branch-Fleming, MD Katya Georgiev PA-C Nam Ho, MD Pediatrics   CHRISTY Aranda, MD Hyacinth Alonso CNP, MD Deborah Mielke, MD Kim Thein, APRN [facility]      Clinic hours: Monday - Thursday 7 am-7 pm; Fridays 7 am-5 pm.   Urgent care: Monday - Friday 11 am-9 pm; Saturday and Sunday 9 am-5 pm.  Pharmacy : Monday -Thursday 8 am-8 pm; Friday 8 am-6 pm; Saturday and Sunday 9 am-5 pm.     Clinic: [phone]   Pharmacy: [phone]

## 2019-01-31 NOTE — PROGRESS NOTES
Clinic Care Coordination Contact    Situation: Patient chart reviewed by care coordinator.    Background: RN CC reviewing chart for follow up.    Assessment: Patient is being seen by PCP today.    Plan/Recommendations: RN CC will follow up with patient in l-2 weeks.    Melissa Behl BSN, RN, [facility] Care Coordinator  [phone]

## 2019-02-01 LAB
HAV IGM SERPL QL IA: NONREACTIVE
TTG IGA SER-ACNC: 2 U/ML
TTG IGG SER-ACNC: 1 U/ML

## 2019-02-05 LAB — CALPROTECTIN STL-MCNT: 92.4 MG/KG (ref 0–49.9)

## 2019-02-13 ENCOUNTER — PATIENT OUTREACH (OUTPATIENT)
Dept: CARE COORDINATION | Facility: CLINIC | Age: 54
End: 2019-02-13

## 2019-02-13 NOTE — PROGRESS NOTES
Clinic Care Coordination Contact  Outreach    The patient left a message on the voicemail of the assigned Clinic RN Care Coordinator.  The writer is covering for the assigned RN care coordinator and call the patient to find out what the question was that he had.  Patient stumbled over his words he kept saying that he could not remember.  Once he settled down he was able to state that he wants to know if Dr. Pacheco will sign the paperwork for him to get a lift chair.  The patient gives numerous examples of how he falls off the couch or falls out of the chair and cannot get up.  Patient states once his son had to pick him up after he had fallen out of the chair.  He feels that a lift chair will eliminate this falling issue.  It was explained to him that he will need to see Dr. pacheco for a face-to-face visit for that form to be filled out and to clarify that he does need a lift chair.  The patient is on [employer] [place], and he does have a [employer] worker.  He states the [employer] worker is the person that gave him the forms to be filled out for the lift chair.  At the end of the conversation, the patient was transferred to the scheduling line to make a follow-up appointment with the provider.    Plan: The patient will make and attend an appointment with the provider to bring the form and for his lift chair.      Clark Jacob MSN, RN, PHN, [facility]   Clinical RN Care Coordinator  [facility]   [email or web address]  Office: [phone]

## 2019-02-18 ENCOUNTER — TELEPHONE (OUTPATIENT)
Dept: FAMILY MEDICINE | Facility: CLINIC | Age: 54
End: 2019-02-18

## 2019-02-18 NOTE — TELEPHONE ENCOUNTER
Reason for Call:  Other prescription    Detailed comments: Please call when back chair forms are received.     Phone Number can be reached at: [facility] [employer] [phone]    Best Time: any    Can we leave a detailed message on this number? YES    Call taken on 2/18/2019 at 12:39 PM by Britney Patel

## 2019-02-18 NOTE — TELEPHONE ENCOUNTER
Fax is received and forward to Dr. Childs to address for patient. Papito Stahl,  For Teams [employer]

## 2019-02-19 ENCOUNTER — ANCILLARY PROCEDURE (OUTPATIENT)
Dept: GENERAL RADIOLOGY | Facility: CLINIC | Age: 54
End: 2019-02-19
Attending: INTERNAL MEDICINE
Payer: COMMERCIAL

## 2019-02-19 ENCOUNTER — OFFICE VISIT (OUTPATIENT)
Dept: FAMILY MEDICINE | Facility: CLINIC | Age: 54
End: 2019-02-19
Payer: COMMERCIAL

## 2019-02-19 ENCOUNTER — MEDICAL CORRESPONDENCE (OUTPATIENT)
Dept: HEALTH INFORMATION MANAGEMENT | Facility: CLINIC | Age: 54
End: 2019-02-19

## 2019-02-19 VITALS
OXYGEN SATURATION: 97 % | DIASTOLIC BLOOD PRESSURE: 61 MMHG | WEIGHT: 176 LBS | TEMPERATURE: 97.4 F | SYSTOLIC BLOOD PRESSURE: 89 MMHG | HEART RATE: 95 BPM | HEIGHT: 71 IN | BODY MASS INDEX: 24.64 KG/M2 | RESPIRATION RATE: 16 BRPM

## 2019-02-19 DIAGNOSIS — R58 ECCHYMOSIS OF FOREARM: ICD-10-CM

## 2019-02-19 DIAGNOSIS — M16.0 PRIMARY OSTEOARTHRITIS OF BOTH HIPS: ICD-10-CM

## 2019-02-19 DIAGNOSIS — M17.0 PRIMARY OSTEOARTHRITIS OF BOTH KNEES: Primary | ICD-10-CM

## 2019-02-19 LAB
ALT SERPL W P-5'-P-CCNC: 58 U/L (ref 0–70)
AST SERPL W P-5'-P-CCNC: 165 U/L (ref 0–45)
ETHANOL SERPL-MCNC: 0.28 G/DL
INR PPP: 1.12 (ref 0.86–1.14)
PLATELET # BLD AUTO: 152 10E9/L (ref 150–450)
VIT B12 SERPL-MCNC: 1070 PG/ML (ref 193–986)

## 2019-02-19 PROCEDURE — 73521 X-RAY EXAM HIPS BI 2 VIEWS: CPT

## 2019-02-19 PROCEDURE — 84460 ALANINE AMINO (ALT) (SGPT): CPT | Performed by: INTERNAL MEDICINE

## 2019-02-19 PROCEDURE — 99000 SPECIMEN HANDLING OFFICE-LAB: CPT | Performed by: INTERNAL MEDICINE

## 2019-02-19 PROCEDURE — 73565 X-RAY EXAM OF KNEES: CPT

## 2019-02-19 PROCEDURE — 85610 PROTHROMBIN TIME: CPT | Performed by: INTERNAL MEDICINE

## 2019-02-19 PROCEDURE — 99214 OFFICE O/P EST MOD 30 MIN: CPT | Performed by: INTERNAL MEDICINE

## 2019-02-19 PROCEDURE — 36415 COLL VENOUS BLD VENIPUNCTURE: CPT | Performed by: INTERNAL MEDICINE

## 2019-02-19 PROCEDURE — 82607 VITAMIN B-12: CPT | Performed by: INTERNAL MEDICINE

## 2019-02-19 PROCEDURE — 84450 TRANSFERASE (AST) (SGOT): CPT | Performed by: INTERNAL MEDICINE

## 2019-02-19 PROCEDURE — 83921 ORGANIC ACID SINGLE QUANT: CPT | Mod: 90 | Performed by: INTERNAL MEDICINE

## 2019-02-19 PROCEDURE — 85049 AUTOMATED PLATELET COUNT: CPT | Performed by: INTERNAL MEDICINE

## 2019-02-19 PROCEDURE — 80320 DRUG SCREEN QUANTALCOHOLS: CPT | Performed by: INTERNAL MEDICINE

## 2019-02-19 PROCEDURE — 84425 ASSAY OF VITAMIN B-1: CPT | Mod: 90 | Performed by: INTERNAL MEDICINE

## 2019-02-19 ASSESSMENT — MIFFLIN-ST. JEOR: SCORE: 1652.52

## 2019-02-19 ASSESSMENT — PAIN SCALES - GENERAL: PAINLEVEL: NO PAIN (0)

## 2019-02-19 NOTE — TELEPHONE ENCOUNTER
Notify Kirsty from [facility] [employer] that I will not fill the the lift chair forms for this patient since it is NOT clinically indicated.

## 2019-02-19 NOTE — PROGRESS NOTES
SUBJECTIVE:   Marlon Mackey is a 53 year old male who presents to clinic today for a lift chair evaluation. The patient has nonischemic cardiomyopathy secondary to alcoholism. He claims that he has difficulty standing up from his chair due to arthritis of both hips and knees plus intermittent weakness of both legs. Trino claims that he will occasionally fall down when he tries to stand up. He does not have any history of stroke, but claims that both legs are sometimes wobbly, yet Trino is still able to walk without any use any assist devices. Previous use of NSAIDs are not effective in relieving arthritis of both hips and knees.    Problem list and histories reviewed & adjusted, as indicated.  Additional history: as documented    Patient Active Problem List   Diagnosis     CARDIOVASCULAR SCREENING; LDL GOAL LESS THAN 130     Overweight (BMI 25.0-29.9)     Tobacco abuse     Cervical radiculopathy     Cardiomyopathy, alcoholic (H)     Tinnitus, bilateral     Chronic systolic congestive heart failure (H)     Alcoholic cirrhosis of liver with ascites (H)     Alcohol abuse     Diarrhea, unspecified type     Past Surgical History:   Procedure Laterality Date     ORTHOPEDIC SURGERY Right     arm       Social History     Tobacco Use     Smoking status: Current Every Day Smoker     Packs/day: 1.00     Smokeless tobacco: Never Used   Substance Use Topics     Alcohol use: Yes     Comment: weekly     Family History   Problem Relation Age of Onset     Cancer Father      Family History Negative Other          Current Outpatient Medications   Medication Sig Dispense Refill     bumetanide (BUMEX) 1 MG tablet Take 1 mg by mouth daily Take with Spironolactone.       carvedilol (COREG) 3.125 MG tablet TAKE 1 TABLET BY MOUTH TWICE DAILY WITH MEALS 60 tablet 11     ramipril (ALTACE) 2.5 MG capsule Take 1 capsule (2.5 mg) by mouth daily 90 capsule 3     No Known Allergies  Recent Labs   Lab Test 01/31/19  1200 12/24/18  1017  "12/04/18  1417 06/07/18  1216 06/07/18  1207   LDL  --   --   --  73 69   HDL  --   --   --  51 53   TRIG  --   --   --  136 135   ALT 41 124* 67  --   --    CR 0.75 1.06 1.43*  --  0.77   GFRESTIMATED >90 79 51*  --  >90   GFRESTBLACK >90 >90 62  --  >90   POTASSIUM 3.7 4.0 4.1  --  4.2      BP Readings from Last 3 Encounters:   02/19/19 (!) 89/61   01/31/19 95/64   01/25/19 119/73    Wt Readings from Last 3 Encounters:   02/19/19 79.8 kg (176 lb)   01/31/19 81.6 kg (180 lb)   01/25/19 82.6 kg (182 lb)         ROS:  CONSTITUTIONAL: NEGATIVE for fever, chills, change in weight  INTEGUMENTARY/SKIN: NEGATIVE for worrisome rashes, moles or lesions  EYES: NEGATIVE for vision changes or irritation  ENT/MOUTH: NEGATIVE for ear, mouth and throat problems  RESP: NEGATIVE for significant cough or SOB  CV: NEGATIVE for chest pain, palpitations or peripheral edema  GI: NEGATIVE for nausea, abdominal pain, heartburn, or change in bowel habits  : NEGATIVE for frequency, dysuria, or hematuria  MUSCULOSKELETAL: NEGATIVE for significant arthralgias or myalgia  NEURO: NEGATIVE for weakness, dizziness or paresthesias  ENDOCRINE: NEGATIVE for temperature intolerance, skin/hair changes  HEME: NEGATIVE for bleeding problems  PSYCHIATRIC: NEGATIVE for changes in mood or affect    OBJECTIVE:     BP (!) 89/61 (BP Location: Left arm, Patient Position: Chair, Cuff Size: Adult Regular)   Pulse 95   Temp 97.4  F (36.3  C) (Oral)   Resp 16   Ht 1.791 m (5' 10.5\")   Wt 79.8 kg (176 lb)   SpO2 97%   BMI 24.90 kg/m    Body mass index is 24.9 kg/m .  GENERAL: healthy, alert and no distress  EYES: Eyes grossly normal to inspection, PERRL and conjunctivae and sclerae normal  HENT: ear canals and TM's normal, nose and mouth without ulcers or lesions  NECK: no adenopathy, no asymmetry, masses, or scars and thyroid normal to palpation  RESP: lungs clear to auscultation - no rales, rhonchi or wheezes  CV: regular rate and rhythm, normal S1 S2, " no S3 or S4, no murmur, click or rub, no peripheral edema and peripheral pulses strong  ABDOMEN: soft, nontender, no hepatosplenomegaly, no masses and bowel sounds normal  MS: no gross musculoskeletal defects noted, no edema  SKIN: no suspicious lesions or rashes  NEURO: Normal strength and tone, mentation intact and speech normal  PSYCH: mentation appears normal, affect normal/bright    Diagnostic Test Results:  Results for orders placed or performed in visit on 02/19/19   XR Knee AP Standing Bilateral    Narrative    XR KNEE AP STANDING BILATERAL 2/19/2019 12:04 PM    HISTORY: Bilateral knee pain.    COMPARISON: None.    FINDINGS: Mild symmetric narrowing of tibiofemoral compartment joint  space. No significant marginal osteophyte formation. No malalignment.      Impression    IMPRESSION: Mild joint space narrowing.    MARIA LUISA GAITAN MD   XR Hip Bilateral 1 View Each    Narrative    XR HIP BILATERAL 1 VIEW EACH 2/19/2019 12:05 PM    HISTORY: Bilateral hip pain.    COMPARISON: None.    FINDINGS: Moderate symmetric narrowing of hip joint space. Tiny  marginal osteophytes. No malalignment. No acute osseous abnormality.      Impression    IMPRESSION: Mild to moderate osteoarthritis.    MARIA LUISA GAITAN MD   Methylmalonic acid   Result Value Ref Range    Methylmalonic Acid 0.13 0.00 - 0.40 umol/L   Vitamin B12   Result Value Ref Range    Vitamin B12 1,070 (H) 193 - 986 pg/mL   Vitamin B1 whole blood   Result Value Ref Range    Vitamin B1 Whole Blood Level 94 70 - 180 nmol/L   AST   Result Value Ref Range     (H) 0 - 45 U/L   ALT   Result Value Ref Range    ALT 58 0 - 70 U/L   Alcohol ethyl   Result Value Ref Range    Ethanol g/dL 0.28 (H) <0.01 g/dL   Platelet count   Result Value Ref Range    Platelet Count 152 150 - 450 10e9/L   INR   Result Value Ref Range    INR 1.12 0.86 - 1.14     (M17.0) Primary osteoarthritis of both knees  (primary encounter diagnosis)  Comment: Etiology of difficulty standing and impaired  ADLs, increasing risk of falls. He will benefit from using a lift chair and walk-in shower. Treatment failure with oral NSAIDs.  Plan: XR Knee AP Standing Bilateral          (M16.0) Primary osteoarthritis of both hips  Comment: Another reason for difficulty standing and impaired ADLs, hence, Trino will benefit from both a walk-in shower and lift chair. Treatment failure with oral NSAIDs.  Plan: XR Hip Bilateral 1 View Each                (R58) Ecchymosis of forearm  Comment: Platelet count is normal. Rule out autoimmune causes.  Plan: Platelet count, INR          Follow-up in one month.    Frank Childs MD  [facility]

## 2019-02-19 NOTE — PATIENT INSTRUCTIONS
At [facility], we strive to deliver an exceptional experience to you, every time we see you.  If you receive a survey in the mail, please send us back your thoughts. We really do value your feedback.    Based on your medical history, these are the current health maintenance/preventive care services that you are due for (some may have been done at this visit.)  Health Maintenance Due   Topic Date Due     TOBACCO CESSATION COUNSELING Q1 YR  1965     HF ACTION PLAN Q3 YR  1965     DEPRESSION ACTION PLAN  09/05/1982     HIV SCREEN (SYSTEM ASSIGNED)  09/05/1982     HEPATITIS C SCREENING  09/05/1982     COLON CANCER SCREEN (SYSTEM ASSIGNED)  09/05/2014     ZOSTER IMMUNIZATION (1 of 2) 09/05/2014     INFLUENZA VACCINE (1) 09/01/2018         Suggested websites for health information:  Www.[employer].NUOFFER : Up to date and easily searchable information on multiple topics.  Www.[place]ino.gov : medication info, interactive tutorials, watch real surgeries online  Www.familydoctor.org : good info from the Academy of Family Physicians  Www.cdc.gov : public health info, travel advisories, epidemics (H1N1)  Www.aap.org : children's health info, normal development, vaccinations  Www.health.[facility].[place].us : [place] dept of health, public health issues in [place], N1N1    Your care team:                            Family Medicine Internal Medicine   MD Frank Barkley MD Shantel Branch-Fleming, MD Katya Georgiev PA-C Nam Ho, MD Pediatrics   CHRISTY Aranda, MD Hyacinth Alonso CNP, MD Deborah Mielke, MD Kim Thein, APRN [facility]      Clinic hours: Monday - Thursday 7 am-7 pm; Fridays 7 am-5 pm.   Urgent care: Monday - Friday 11 am-9 pm; Saturday and Sunday 9 am-5 pm.  Pharmacy : Monday -Thursday 8 am-8 pm; Friday 8 am-6 pm; Saturday and Sunday 9 am-5 pm.     Clinic: [phone]   Pharmacy: [phone]

## 2019-02-19 NOTE — TELEPHONE ENCOUNTER
Called and spoke with Kirsty regarding lift chair. She had requesting that the provider fills it out stating that you will not fill due to it not being clinically indicated. For insurance purposes.     Once done fax it back to her.     Maty Martinez MA

## 2019-02-21 LAB — METHYLMALONATE SERPL-SCNC: 0.13 UMOL/L (ref 0–0.4)

## 2019-02-22 LAB — VIT B1 BLD-MCNC: 94 NMOL/L (ref 70–180)

## 2019-02-28 ENCOUNTER — PATIENT OUTREACH (OUTPATIENT)
Dept: CARE COORDINATION | Facility: CLINIC | Age: 54
End: 2019-02-28

## 2019-02-28 ASSESSMENT — ACTIVITIES OF DAILY LIVING (ADL): DEPENDENT_IADLS:: TRANSPORTATION;CLEANING;LAUNDRY

## 2019-02-28 NOTE — TELEPHONE ENCOUNTER
During RN CC assessment patient inquired if his lift chair forms had been completed.  Noted patient was seen 2/19/19 by PCP to discuss needing a lift chair.  RN CC spoke with Kirsty from [facility] who informed writer she had not received the forms back regarding patient's lift chair.    Please advise on status of forms.    Melissa Behl BSN, RN, N  [facility] Care Coordinator  [phone]

## 2019-02-28 NOTE — PROGRESS NOTES
Clinic Care Coordination Contact    Clinic Care Coordination Contact  OUTREACH    Referral Information:  Referral Source: PCP    Primary Diagnosis: CHF    Chief Complaint   Patient presents with     Clinic Care Coordination - Follow-up     RN        [place] Utilization:   Clinic Utilization  Difficulty keeping appointments:: No  Compliance Concerns: Yes  No-Show Concerns: No  No PCP office visit in Past Year: No  Utilization    Last refreshed: 2/26/2019 11:49 PM:  Hospital Admissions 0           Last refreshed: 2/26/2019 11:49 PM:  ED Visits 0           Last refreshed: 2/26/2019 11:49 PM:  No Show Count (past year) 3              Current as of: 2/26/2019 11:49 PM              Clinical Concerns:  Current Medical Concerns:  Patient was seen by PCP 2/19/19 to obtain a lift chair.  Patient was most concerned regarding this during the phone call.  Noted 2/18/19 telephone encounter from [facility].  RN CC contacted [facility] and spoke with Kirsty to determine if the paperwork for the lift chair was received.  Kirsty informed writer no paperwork had been received.  RN CC updated 2/18/19 telephone encounter and sent to care team to address.  Patient declined to discuss other aspects of his health until he receives information regarding his lift chair.  Patient informed writer he had just gotten home and wanted to sit down and relax.    Patient Active Problem List   Diagnosis     CARDIOVASCULAR SCREENING; LDL GOAL LESS THAN 130     Overweight (BMI 25.0-29.9)     Tobacco abuse     Cervical radiculopathy     Cardiomyopathy, alcoholic (H)     Tinnitus, bilateral     Chronic systolic congestive heart failure (H)     Alcoholic cirrhosis of liver with ascites (H)     Alcohol abuse     Diarrhea, unspecified type      Current Behavioral Concerns: Patient with ongoing alcoholism, last alcohol level on 2/19/19 at office visit was 0.28.       Education Provided to patient: RN ANAHI reviewed current goals, patient's main goal at this time  is to obtain a lift chair.   Pain  Pain (GOAL):: No  Health Maintenance Reviewed: Due/Overdue   Health Maintenance Due   Topic Date Due     TOBACCO CESSATION COUNSELING Q1 YR  1965     PREVENTIVE CARE VISIT  1965     HF ACTION PLAN Q3 YR  1965     DEPRESSION ACTION PLAN  09/05/1982     HIV SCREEN (SYSTEM ASSIGNED)  09/05/1982     HEPATITIS C SCREENING  09/05/1982     COLON CANCER SCREEN (SYSTEM ASSIGNED)  09/05/2014     ZOSTER IMMUNIZATION (1 of 2) 09/05/2014     INFLUENZA VACCINE (1) 09/01/2018      Clinical Pathway: None    Medication Management:  Patient reports compliance.     Functional Status:  Dependent ADLs:: Wheelchair-independent  Dependent IADLs:: Transportation, Cleaning, Laundry  Bed or wheelchair confined:: Yes  Mobility Status: Independent w/Device    Living Situation:  Current living arrangement:: I live alone  Type of residence:: [facility]    Diet/Exercise/Sleep:  Diet:: No added salt, Low saturated fat  Inadequate nutrition (GOAL):: No  Food Insecurity: No  Tube Feeding: No  Exercise:: Unable to exercise  Inadequate activity/exercise (GOAL):: No  Significant changes in sleep pattern (GOAL): No    Transportation:  Transportation concerns (GOAL):: No  Transportation means:: Medical transport     Psychosocial:  Yazdanism or spiritual beliefs that impact treatment:: No  Mental health DX:: No  Mental health management concern (GOAL):: No  Informal Support system:: Parent, Family, Friends, Children     Financial/Insurance:   Financial/Insurance concerns (GOAL):: No       Resources and Interventions:  Current Resources:    ;   Community Resources: PCA  Supplies used at home:: None  Equipment Currently Used at Home: wheelchair, power    Advance Care Plan/Directive  Advanced Care Plans/Directives on file:: No  Advanced Care Plan/Directive Status: Not Applicable    Referrals Placed: None     Goals:   Goals        General    #1  Reducing Risks (pt-stated)     Notes - Note edited  1/31/2019  12:57 PM by Behl, Melissa K, RN    Goal Statement: I will decrease my smoking from 10 cigarettes to 8 cigarettes per day.  Measure of Success: patient statement   Supportive Steps to Achieve: explanation of rationale and the effects on his health.  Barriers: addiction to nicotine  Strengths: engaged in care coordination  Date to Achieve By: 4/1/19  Patient expressed understanding of goal: yes        #2 Functional (pt-stated)     Notes - Note created  2/28/2019  1:46 PM by Behl, Melissa K, RN    Goal Statement: I want a lift chair.  Measure of Success: Patient will obtain lift chair.  Supportive Steps to Achieve: Patient was seen by PCP 2/19/19.  Barriers: needing paperwork completed  Strengths: care coordination  Date to Achieve By: 5/1/19  Patient expressed understanding of goal: yes                 Patient/Caregiver understanding: Patient was focused on obtaining a lift chair during this encounter.    Outreach Frequency: 2 weeks      Plan:   1. RN CC sent message to PCP's care team inquiring status of lift chair forms.  2. Patient will continue to work on smoking cessation.  3. RN CC will follow up with patient in 1-3 weeks.    Melissa Behl BSN, RN, PHN  [facility] Care Coordinator  [phone]

## 2019-02-28 NOTE — LETTER
[facility] Home  Complex Care Plan  About Me  Patient Name:  Marlon Mackey    YOB: 1965  Age:     53 year old   Derek MRN:   [id number] Telephone Information:  Home Phone [phone]   Mobile [phone]       Address:    [address]  [facility] [address] Email address:  No e-mail address on record      Emergency Contact(s)  Name Relationship Lgl Grd Work Phone Home Phone Mobile Phone   1. ESVIN MACKEY Mother   [phone]    2. NO SECONDARY C*    None            Primary language:  English     needed? No   [place] [employer] [facility]:  [phone] op. 1  Other communication barriers: None  Preferred Method of Communication:  Mail  Current living arrangement: I live alone  Mobility Status/ Medical Equipment: Independent w/Device    Health Maintenance  Health Maintenance Reviewed: Due/Overdue   Health Maintenance Due   Topic Date Due     TOBACCO CESSATION COUNSELING Q1 YR  1965     PREVENTIVE CARE VISIT  1965     HF ACTION PLAN Q3 YR  1965     DEPRESSION ACTION PLAN  09/05/1982     HIV SCREEN (SYSTEM ASSIGNED)  09/05/1982     HEPATITIS C SCREENING  09/05/1982     COLON CANCER SCREEN (SYSTEM ASSIGNED)  09/05/2014     ZOSTER IMMUNIZATION (1 of 2) 09/05/2014     INFLUENZA VACCINE (1) 09/01/2018        My Access Plan  Medical Emergency 911   Primary Clinic Line [facility] - [phone]   24 Hour Appointment Line [phone] or  [phone]) (toll-free)   24 Hour Nurse Line [phone] (toll-free)   [facility]erred [facility], [phone]   [facility]  [phone]   Preferred Pharmacy [facility], [place] - [address]     Behavioral Health Crisis Line The National Suicide Prevention Lifeline at 1-411.125.8403 or 911     My Care Team Members    Patient Care Team       Relationship  Specialty Notifications Start End    Frank Childs MD PCP - General Internal Medicine  10/14/15     Phone: [phone] Fax: [phone]         [address] [place] [facility] [address]    Frank Childs MD PCP - Assigned PCP   4/1/18     Phone: [phone] Fax: [phone]         [address] [place] [facility] [address]    Behl, Melissa K, RN Lead Care Coordinator Primary Care - CC Admissions 11/2/18     Phone: [phone] Fax: [phone]                My Care Plans  Self Management and Treatment Plan  Goals and (Comments)  Goals        General    #1  Reducing Risks (pt-stated)     Notes - Note edited  1/31/2019 12:57 PM by Behl, Melissa K, RN    Goal Statement: I will decrease my smoking from 10 cigarettes to 8 cigarettes per day.  Measure of Success: patient statement   Supportive Steps to Achieve: explanation of rationale and the effects on his health.  Barriers: addiction to nicotine  Strengths: engaged in care coordination  Date to Achieve By: 4/1/19  Patient expressed understanding of goal: yes        #2 Functional (pt-stated)     Notes - Note created  2/28/2019  1:46 PM by Behl, Melissa K, RN    Goal Statement: I want a lift chair.  Measure of Success: Patient will obtain lift chair.  Supportive Steps to Achieve: Patient was seen by PCP 2/19/19.  Barriers: needing paperwork completed  Strengths: care coordination  Date to Achieve By: 5/1/19  Patient expressed understanding of goal: yes                Action Plans on File:                       Advance Care Plans/Directives Type:        My Medical and Care Information  Problem List   Patient Active Problem List   Diagnosis     CARDIOVASCULAR SCREENING; LDL GOAL LESS THAN 130     Overweight (BMI 25.0-29.9)     Tobacco abuse     Cervical radiculopathy     Cardiomyopathy, alcoholic (H)     Tinnitus, bilateral     Chronic systolic congestive heart failure (H)     Alcoholic cirrhosis of liver with ascites (H)     Alcohol abuse     Diarrhea,  unspecified type      Current Medications and Allergies:  See printed Medication Report.    Care Coordination Start Date: 11/1/2018   Frequency of Care Coordination: 2 weeks   Form Last Updated: 02/28/2019

## 2019-03-12 ENCOUNTER — PATIENT OUTREACH (OUTPATIENT)
Dept: CARE COORDINATION | Facility: CLINIC | Age: 54
End: 2019-03-12

## 2019-03-12 ASSESSMENT — ACTIVITIES OF DAILY LIVING (ADL): DEPENDENT_IADLS:: TRANSPORTATION;CLEANING;LAUNDRY

## 2019-03-12 NOTE — PROGRESS NOTES
"Clinic Care Coordination Contact    Clinic Care Coordination Contact  OUTREACH    Referral Information:  Referral Source: PCP    Primary Diagnosis: CHF    Chief Complaint   Patient presents with     Clinic Care Coordination - Follow-up        [place] Utilization:   Clinic Utilization  Difficulty keeping appointments:: No  Compliance Concerns: Yes  No-Show Concerns: No  No PCP office visit in Past Year: No  Utilization    Last refreshed: 3/5/2019  3:28 PM:  Hospital Admissions 0           Last refreshed: 3/5/2019  3:28 PM:  ED Visits 0           Last refreshed: 3/5/2019  3:28 PM:  No Show Count (past year) 3              Current as of: 3/5/2019  3:28 PM              Clinical Concerns:  Current Medical Concerns:  RN CC followed up with patient.  Patient is no longer interested in quitting smoking.  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  Patient states he will address this with Dr. Childs when he schedules a follow up visit.     Patient reports poor sleep, often times not falling asleep until 5:00 am.  RN CC reviewed sleep hygiene and patient stated he has tried Zquil.  RN CC advised patient to further discuss with his PCP.    Patient Active Problem List   Diagnosis     CARDIOVASCULAR SCREENING; LDL GOAL LESS THAN 130     Overweight (BMI 25.0-29.9)     Tobacco abuse     Cervical radiculopathy     Cardiomyopathy, alcoholic (H)     Tinnitus, bilateral     Chronic systolic congestive heart failure (H)     Alcoholic cirrhosis of liver with ascites (H)     Alcohol abuse     Diarrhea, unspecified type      Current Behavioral Concerns: Patient continues to drink alcohol and does not wish to stop.      Education Provided to patient: RN CC educated " "patient on the importance of smoking cessation, minimizing alcohol intake, [employer] and pain management.     Pain  Pain (GOAL):: Yes  Type: Chronic (>3mo)  Location of chronic pain:: back, foot, right foot  Radiating: No  Progression: Worsening  Description of pain: Aching, Throbbing  Chronic pain severity:: (\"it hurts)  Limitation of routine activities due to chronic pain:: Yes  Description: Able to do moderate activities  Alleviating Factors: Rest  Aggravating Factors: Activity  Health Maintenance Reviewed: Due/Overdue   Health Maintenance Due   Topic Date Due     TOBACCO CESSATION COUNSELING Q1 YR  1965     PREVENTIVE CARE VISIT  1965     HF ACTION PLAN Q3 YR  1965     DEPRESSION ACTION PLAN  09/05/1982     HIV SCREEN (SYSTEM ASSIGNED)  09/05/1982     HEPATITIS C SCREENING  09/05/1982     COLON CANCER SCREEN (SYSTEM ASSIGNED)  09/05/2014     ZOSTER IMMUNIZATION (1 of 2) 09/05/2014     INFLUENZA VACCINE (1) 09/01/2018      Clinical Pathway: None    Medication Management:  Patient takes medications as prescribed in the morning.     Functional Status:  Dependent ADLs:: Wheelchair-independent  Dependent IADLs:: Transportation, Cleaning, Laundry  Bed or wheelchair confined:: Yes  Mobility Status: Independent w/Device    Living Situation:  Current living arrangement:: I live alone  Type of residence:: Town home    Diet/Exercise/Sleep:  Diet:: No added salt, Low saturated fat  Inadequate nutrition (GOAL):: No  Food Insecurity: No  Tube Feeding: No  Exercise:: Unable to exercise  Inadequate activity/exercise (GOAL):: No  Significant changes in sleep pattern (GOAL): No    Transportation:  Transportation concerns (GOAL):: No  Transportation means:: Medical transport, Public transportation, Family, Friend(Pt declines to apply for [employer])     Psychosocial:  Christian or spiritual beliefs that impact treatment:: No  Mental health DX:: No  Mental health management concern (GOAL):: No  Informal " Support system:: Parent, Family, Friends, Children     Financial/Insurance:   Financial/Insurance concerns (GOAL):: No       Resources and Interventions:  Current Resources:    ;   Community Resources: PCA(Pt's daughter is his PCA)  Supplies used at home:: None  Equipment Currently Used at Home: wheelchair, power    Advance Care Plan/Directive  Advanced Care Plans/Directives on file:: No  Advanced Care Plan/Directive Status: Not Applicable    Referrals Placed: None     Goals:   Goals        General    #1 Functional (pt-stated)     Notes - Note created  2/28/2019  1:46 PM by Behl, Melissa K, RN    Goal Statement: I want a lift chair.  Measure of Success: Patient will obtain lift chair.  Supportive Steps to Achieve: Patient was seen by PCP 2/19/19.  Barriers: needing paperwork completed  Strengths: care coordination  Date to Achieve By: 5/1/19  Patient expressed understanding of goal: yes                 Patient/Caregiver understanding: Patient has poor to fair understanding of his current plan of care and disease process.    Outreach Frequency: monthly      Plan:   1. RN CC will continue to assist as needed in patient obtaining a lift chair.  2. Patient will schedule a follow up appointment with PCP within the next 2 weeks.  Patient will discuss his sleep and right foot/leg pain concerns at this appointment.  3. RN CC will follow patient on a monthly and as needed basis.    Melissa Behl BSN, RN, N  [facility] Care Coordinator  [phone]

## 2019-03-18 ENCOUNTER — MEDICAL CORRESPONDENCE (OUTPATIENT)
Dept: HEALTH INFORMATION MANAGEMENT | Facility: CLINIC | Age: 54
End: 2019-03-18

## 2019-03-18 NOTE — TELEPHONE ENCOUNTER
Forms completed and placed in TC basket for faxing.   Clinic note last 2/19/2019 attached to forms.

## 2019-03-25 ENCOUNTER — PATIENT OUTREACH (OUTPATIENT)
Dept: CARE COORDINATION | Facility: CLINIC | Age: 54
End: 2019-03-25

## 2019-03-25 ASSESSMENT — ACTIVITIES OF DAILY LIVING (ADL): DEPENDENT_IADLS:: TRANSPORTATION;CLEANING;LAUNDRY

## 2019-03-25 NOTE — PROGRESS NOTES
"Clinic Care Coordination Contact  Care Team Conversations    Patient called writer to schedule a f/u appointment with PCP for medication refill.  RN CC informed patient that PCP is out of the office on a leave for the next 2 months.  RN CC offered to connect patient to scheduling to schedule patient with another provider.  Patient declined at this time, \"I might just come into Urgent Care.\"  RN CC reviewed the multiple provider options and hours with [facility] location.  Patient will consider.    Patient requested an update on his lift chair.  RN CC reviewed chart and noted the paperwork from the provider was faxed to [facility] medical on 3/18/19.  Patient verbalized understanding and will check with [facility] on status.    Plan:  RN CC will follow up with patient around 4/12/19 as previously planned.    Melissa Behl BSN, RN, [facility] Care Coordinator  [phone]       "

## 2019-04-02 DIAGNOSIS — I42.6 CARDIOMYOPATHY, ALCOHOLIC (H): ICD-10-CM

## 2019-04-02 NOTE — TELEPHONE ENCOUNTER
"Requested Prescriptions   Pending Prescriptions Disp Refills     ramipril (ALTACE) 2.5 MG capsule [Pharmacy Med Name: RAMIPRIL 2.5MG CAPSULES] 60 capsule 0          Last Written Prescription Date:  12/11/18  Last Fill Quantity: 90,  # refills: 3   Last Office Visit with G, P or [facility] prescribing provider:  2/19/19   Future Office Visit:      Sig: TAKE ONE CAPSULE BY MOUTH TWICE DAILY. TAKE WITH CARVEDILOL AND BUMETANIDE    ACE Inhibitors (Including Combos) Protocol Passed - 4/2/2019  6:25 PM       Passed - Blood pressure under 140/90 in past 12 months    BP Readings from Last 3 Encounters:   02/19/19 (!) 89/61   01/31/19 95/64   01/25/19 119/73                Passed - Recent (12 mo) or future (30 days) visit within the authorizing provider's specialty    Patient had office visit in the last 12 months or has a visit in the next 30 days with authorizing provider or within the authorizing provider's specialty.  See \"Patient Info\" tab in inbasket, or \"Choose Columns\" in Meds & Orders section of the refill encounter.             Passed - Medication is active on med list       Passed - Patient is age 18 or older       Passed - Normal serum creatinine on file in past 12 months    Recent Labs   Lab Test 01/31/19  1200   CR 0.75            Passed - Normal serum potassium on file in past 12 months    Recent Labs   Lab Test 01/31/19  1200   POTASSIUM 3.7                 Satish Faarax  [facility]    "

## 2019-04-04 ENCOUNTER — OFFICE VISIT (OUTPATIENT)
Dept: FAMILY MEDICINE | Facility: CLINIC | Age: 54
End: 2019-04-04
Payer: COMMERCIAL

## 2019-04-04 VITALS
HEIGHT: 71 IN | TEMPERATURE: 97.5 F | DIASTOLIC BLOOD PRESSURE: 34 MMHG | SYSTOLIC BLOOD PRESSURE: 73 MMHG | HEART RATE: 74 BPM | OXYGEN SATURATION: 99 % | WEIGHT: 176 LBS | BODY MASS INDEX: 24.64 KG/M2

## 2019-04-04 DIAGNOSIS — R39.9 LOWER URINARY TRACT SYMPTOMS (LUTS): ICD-10-CM

## 2019-04-04 DIAGNOSIS — R19.7 DIARRHEA, UNSPECIFIED TYPE: Primary | ICD-10-CM

## 2019-04-04 DIAGNOSIS — Z12.11 SPECIAL SCREENING FOR MALIGNANT NEOPLASMS, COLON: ICD-10-CM

## 2019-04-04 DIAGNOSIS — K62.5 RECTAL BLEEDING: ICD-10-CM

## 2019-04-04 DIAGNOSIS — Z11.59 NEED FOR HEPATITIS C SCREENING TEST: ICD-10-CM

## 2019-04-04 DIAGNOSIS — Z11.4 SCREENING FOR HIV (HUMAN IMMUNODEFICIENCY VIRUS): ICD-10-CM

## 2019-04-04 LAB
ALBUMIN SERPL-MCNC: 2.4 G/DL (ref 3.4–5)
ALP SERPL-CCNC: 235 U/L (ref 40–150)
ALT SERPL W P-5'-P-CCNC: 41 U/L (ref 0–70)
ANION GAP SERPL CALCULATED.3IONS-SCNC: 11 MMOL/L (ref 3–14)
AST SERPL W P-5'-P-CCNC: 102 U/L (ref 0–45)
BILIRUB SERPL-MCNC: 2.7 MG/DL (ref 0.2–1.3)
BUN SERPL-MCNC: 26 MG/DL (ref 7–30)
BURR CELLS BLD QL SMEAR: SLIGHT
CALCIUM SERPL-MCNC: 7.8 MG/DL (ref 8.5–10.1)
CHLORIDE SERPL-SCNC: 100 MMOL/L (ref 94–109)
CO2 SERPL-SCNC: 21 MMOL/L (ref 20–32)
CREAT SERPL-MCNC: 1.84 MG/DL (ref 0.66–1.25)
DIFFERENTIAL METHOD BLD: ABNORMAL
EOSINOPHIL # BLD AUTO: 0.1 10E9/L (ref 0–0.7)
EOSINOPHIL NFR BLD AUTO: 1 %
ERYTHROCYTE [DISTWIDTH] IN BLOOD BY AUTOMATED COUNT: 19.8 % (ref 10–15)
GFR SERPL CREATININE-BSD FRML MDRD: 40 ML/MIN/{1.73_M2}
GLUCOSE SERPL-MCNC: 101 MG/DL (ref 70–99)
HCT VFR BLD AUTO: 26.2 % (ref 40–53)
HGB BLD-MCNC: 9.7 G/DL (ref 13.3–17.7)
LYMPHOCYTES # BLD AUTO: 0.4 10E9/L (ref 0.8–5.3)
LYMPHOCYTES NFR BLD AUTO: 5 %
MACROCYTES BLD QL SMEAR: PRESENT
MCH RBC QN AUTO: 37.9 PG (ref 26.5–33)
MCHC RBC AUTO-ENTMCNC: 37 G/DL (ref 31.5–36.5)
MCV RBC AUTO: 102 FL (ref 78–100)
MONOCYTES # BLD AUTO: 0.7 10E9/L (ref 0–1.3)
MONOCYTES NFR BLD AUTO: 8 %
NEUTROPHILS # BLD AUTO: 7.6 10E9/L (ref 1.6–8.3)
NEUTROPHILS NFR BLD AUTO: 86 %
PLATELET # BLD AUTO: 99 10E9/L (ref 150–450)
PLATELET # BLD EST: ABNORMAL 10*3/UL
POTASSIUM SERPL-SCNC: 2.8 MMOL/L (ref 3.4–5.3)
PROT SERPL-MCNC: 6.6 G/DL (ref 6.8–8.8)
PSA SERPL-ACNC: 0.5 UG/L (ref 0–4)
RBC # BLD AUTO: 2.56 10E12/L (ref 4.4–5.9)
SODIUM SERPL-SCNC: 132 MMOL/L (ref 133–144)
TARGETS BLD QL SMEAR: ABNORMAL
WBC # BLD AUTO: 8.8 10E9/L (ref 4–11)

## 2019-04-04 PROCEDURE — 87177 OVA AND PARASITES SMEARS: CPT | Performed by: PHYSICIAN ASSISTANT

## 2019-04-04 PROCEDURE — 85025 COMPLETE CBC W/AUTO DIFF WBC: CPT | Performed by: PHYSICIAN ASSISTANT

## 2019-04-04 PROCEDURE — 86803 HEPATITIS C AB TEST: CPT | Performed by: PHYSICIAN ASSISTANT

## 2019-04-04 PROCEDURE — 87493 C DIFF AMPLIFIED PROBE: CPT | Mod: 59 | Performed by: PHYSICIAN ASSISTANT

## 2019-04-04 PROCEDURE — 87506 IADNA-DNA/RNA PROBE TQ 6-11: CPT | Performed by: PHYSICIAN ASSISTANT

## 2019-04-04 PROCEDURE — 80053 COMPREHEN METABOLIC PANEL: CPT | Performed by: PHYSICIAN ASSISTANT

## 2019-04-04 PROCEDURE — 87209 SMEAR COMPLEX STAIN: CPT | Performed by: PHYSICIAN ASSISTANT

## 2019-04-04 PROCEDURE — G0103 PSA SCREENING: HCPCS | Performed by: PHYSICIAN ASSISTANT

## 2019-04-04 PROCEDURE — 36415 COLL VENOUS BLD VENIPUNCTURE: CPT | Performed by: PHYSICIAN ASSISTANT

## 2019-04-04 PROCEDURE — 82274 ASSAY TEST FOR BLOOD FECAL: CPT | Performed by: PHYSICIAN ASSISTANT

## 2019-04-04 PROCEDURE — 99214 OFFICE O/P EST MOD 30 MIN: CPT | Performed by: PHYSICIAN ASSISTANT

## 2019-04-04 PROCEDURE — 87389 HIV-1 AG W/HIV-1&-2 AB AG IA: CPT | Performed by: PHYSICIAN ASSISTANT

## 2019-04-04 RX ORDER — RAMIPRIL 2.5 MG/1
CAPSULE ORAL
Qty: 60 CAPSULE | Refills: 7 | Status: SHIPPED | OUTPATIENT
Start: 2019-04-04 | End: 2020-03-13

## 2019-04-04 RX ORDER — TAMSULOSIN HYDROCHLORIDE 0.4 MG/1
0.4 CAPSULE ORAL DAILY
Qty: 14 CAPSULE | Refills: 0 | Status: SHIPPED | OUTPATIENT
Start: 2019-04-04 | End: 2019-05-15

## 2019-04-04 ASSESSMENT — PAIN SCALES - GENERAL: PAINLEVEL: EXTREME PAIN (8)

## 2019-04-04 ASSESSMENT — MIFFLIN-ST. JEOR: SCORE: 1652.52

## 2019-04-04 NOTE — TELEPHONE ENCOUNTER
Different pharmacy being requested for refill than previously sent to.   Prescription approved per [facility] [place].        Sridhar Yun RN, BSN

## 2019-04-04 NOTE — PATIENT INSTRUCTIONS
================================================================================  Normal Values   Blood pressure  <140/90 for most adults    <130/80 for some chronic diseases (ask your care team about yours)    BMI (body mass index)  18.5-25 kg/m2 (based on height and weight)     Thank you for visiting [facility]    Normal or non-critical lab and imaging results will be communicated to you by MyChart, letter or phone within 7 days.  If you do not hear from us within 10 days, please call the clinic. If you have a critical or abnormal lab result, we will notify you by phone as soon as possible.     If you have any questions regarding your visit please contact:     Team Comfort:   Clinic Hours Telephone Number   Dr. Simone Quintanilla Dr. V[place] 7am-5pm  Monday - Friday [phone]  Nery RN  Kirti Kong RN   Pharmacy 8:00am-8pm Monday-Friday    9am-5pm Saturday-Sunday [phone]   Urgent Care 11am-9pm Monday-Friday        9am-5pm Saturday-Sunday [phone]     After hours, weekend or if you need to make an appointment with your primary provider please call [phone].   After Hours nurse advise: call [facility]: [phone]    Medication Refills:  Call your pharmacy and they will forward the refill to us. Please allow 3 business days for your refills to be completed.          Patient Education     Diarrhea with Uncertain Cause (Adult)    Diarrhea is when stools are loose and watery. This can be caused by:    Viral infections    Bacterial infections    Food poisoning    Parasites    Irritable bowel syndrome (IBS)    Inflammatory bowel diseases such as ulcerative colitis, Crohn's disease, and celiac disease    Food intolerance, such as to lactose, the sugar found in milk and milk products    Reaction to medicines like antibiotics, laxatives, cancer drugs, and antacids  Along with diarrhea, you may also have:    Abdominal pain and  cramping    Nausea and vomiting    Loss of bowel control    Fever and chills    Bloody stools  In some cases, antibiotics may help to treat diarrhea. You may have a stool sample test. This is done to see what is causing your diarrhea, and if antibiotics will help treat it. The results of a stool sample test may take up to 2 days. The healthcare provider may not give you antibiotics until he or she has the stool test results.  Diarrhea can cause dehydration. This is the loss of too much water and other fluids from the body. When this occurs, body fluid must be replaced. This can be done with oral rehydration solutions. Oral rehydration solutions are available at drugstores and grocery stores without a prescription. Sports drinks are not the best choice if you are very dehydrated. They have too much sugar and not enough electrolytes.  Home care  Follow all instructions given by your healthcare provider. Rest at home for the next 24 hours, or until you feel better. Avoid caffeine, tobacco, and alcohol. These can make diarrhea, cramping, and pain worse.  If taking medicines:    Over-the-counter nausea and diarrhea medicines are generally OK unless you experience fever or blood stool. Check with your doctor first in those circumstances.    You may use acetaminophen or NSAID medicines like ibuprofen or naproxen to reduce pain and fever. Don t use these if you have chronic liver or kidney disease, or ever had a stomach ulcer or gastrointestinal bleeding. Don't use NSAID medicines if you are already taking one for another condition (like arthritis) or are on daily aspirin therapy (such as for heart disease or after a stroke). Talk with your healthcare provider first.    If antibiotics were prescribed, be sure you take them until they are finished. Don t stop taking them even when you feel better. Antibiotics must be taken as a full course.  To prevent the spread of illness:    Remember that washing with soap and water and  using alcohol-based  is the best way to prevent the spread of infection. Dry your hands with a single use towel (like a paper towel).    Clean the toilet after each use.    Wash your hands before eating.    Wash your hands before and after preparing food. Keep in mind that people with diarrhea or vomiting should not prepare food for others.    Wash your hands after using cutting boards, countertops, and knives that have been in contact with raw foods.    Wash and then peel fruits and vegetables.    Keep uncooked meats away from cooked and ready-to-eat foods.    Use a food thermometer when cooking. Cook poultry to at least 165 F (74 C). Cook ground meat (beef, veal, pork, lamb) to at least 160 F (71 C). Cook fresh beef, veal, lamb, and pork to at least 145 F (63 C).    Don t eat raw or undercooked eggs (poached or caitlyn side up), poultry, meat, or unpasteurized milk and juices.  Food and drinks  The main goal while treating vomiting or diarrhea is to prevent dehydration. This is done by taking small amounts of liquids often.    Keep in mind that liquids are more important than food right now.    Drink only small amounts of liquids at a time.    Don t force yourself to eat, especially if you are having cramping, vomiting, or diarrhea. Don t eat large amounts at a time, even if you are hungry.    If you eat, avoid fatty, greasy, spicy, or fried foods.    Don t eat dairy foods or drink milk if you have diarrhea. These can make diarrhea worse.  During the first 24 hours you can try:    Oral rehydration solutions.  Sports drinks may be used if you are not too dehydrated and are otherwise healthy.    Soft drinks without caffeine    Ginger ale    Water (plain or flavored)    Decaf tea or coffee    Clear broth, consommé, or bouillon    Gelatin, popsicles, or frozen fruit juice bars  The second 24 hours, if you are feeling better, you can add:    Hot cereal, plain toast, bread, rolls, or crackers    Plain noodles,  rice, mashed potatoes, chicken noodle soup, or rice soup    Unsweetened canned fruit (no pineapple)    Bananas  As you recover:    Limit fat intake to less than 15 grams per day. Don t eat margarine, butter, oils, mayonnaise, sauces, gravies, fried foods, peanut butter, meat, poultry, or fish.    Limit fiber. Don t eat raw or cooked vegetables, fresh fruits except bananas, or bran cereals.    Limit caffeine and chocolate.    Limit dairy.    Don t use spices or seasonings except salt.    Go back to your normal diet over time, as you feel better and your symptoms improve.    If the symptoms come back, go back to a simple diet or clear liquids.  Follow-up care  Follow up with your healthcare provider, or as advised. If a stool sample was taken or cultures were done, call the healthcare provider for the results as instructed.  Call 911  Call 911 if you have any of these symptoms:    Trouble breathing    Confusion    Extreme drowsiness or trouble walking    Loss of consciousness    Rapid heart rate    Chest pain    Stiff neck    Seizure   When to seek medical advice  Call your healthcare provider right away if any of these occur:    Abdominal pain that gets worse    Constant lower right abdominal pain    Continued vomiting and inability to keep liquids down    Diarrhea more than 5 times a day    Blood in vomit or stool    Dark urine or no urine for 8 hours, dry mouth and tongue, tiredness, weakness, or dizziness    Drowsiness    New rash    You don t get better in 2 to 3 days    Fever of 100.4 F (38 C) or higher, or as directed by your healthcare provider  Date Last Reviewed: 6/1/2018 2000-2018 [employer]. [address] [place] [address], [place] [address]. All rights reserved. This information is not intended as a substitute for professional medical care. Always follow your healthcare professional's instructions.           Patient Education     Treating Diarrhea    Diarrhea happens when you have loose, watery,  or frequent bowel movements. It is a common problem with many causes. Most cases of diarrhea clear up on their own. But certain cases may need treatment. Be sure to see your healthcare provider if your symptoms do not improve within a few days.  Getting relief  Treatment of diarrhea depends on its cause. Diarrhea caused by bacterial or parasite infection is often treated with antibiotics. Diarrhea caused by other factors, such as a stomach virus, often improves with simple home treatment. The tips below may also help relieve your symptoms.    Drink plenty of fluids. This helps prevent too much fluid loss (dehydration). Water, clear soups, and electrolyte solutions are good choices. Avoid alcohol, coffee, tea, and milk. These can irritate your intestines and make symptoms worse.    Suck on ice chips if drinking makes you queasy.    Return to your normal diet slowly. You may want to eat bland foods at first, such as rice and toast. Also, you may need to avoid certain foods for a while, such as dairy products. These can make symptoms worse. Ask your healthcare provider if there are any other foods you should avoid.    If you were prescribed antibiotics, take them as directed.    Do not take anti-diarrhea medicines without asking your healthcare provider first.  Call your healthcare provider   Call your healthcare provider if you have any of the following:     A fever of 100.4 F (38.0 C) or higher, or as directed by your healthcare provider    Severe pain    Worsening diarrhea or diarrhea for more than 2 days    Bloody vomit or stool    Signs of dehydration (dizziness, dry mouth and tongue, rapid pulse, dark urine)  Date Last Reviewed: 7/1/2016 2000-2018 [employer]. [address] [place] [address], [place] [address]. All rights reserved. This information is not intended as a substitute for professional medical care. Always follow your healthcare professional's instructions.           Patient Education      Understanding Rectal Bleeding    Rectal bleeding is when blood passes through your rectum and anus. It can happen with or without a bowel movement. Rectal bleeding may be a sign of a serious problem in your rectum, colon, or upper GI tract. Call your healthcare provider right away if you have any rectal bleeding.  The GI Tract  The gastrointestinal (GI) tract includes the mouth, esophagus, stomach, small intestine, large intestine (colon), rectum, and anus. The food you eat is digested as it passes through the GI tract. Solid waste leaves the body through the rectum.   Rectal bleeding and GI problems  The cause of rectal bleeding may be found in any region of the GI tract. The colon or rectum may be the site of your bleeding problem. Or, bleeding may be due to problems farther up the GI tract, such as in the small intestine, duodenum, or stomach.  Causes of rectal bleeding  Rectal bleeding causes include the following:    Hemorrhoids (swollen veins in the rectum and anus)    Fissures (tears in or near the anus)    Diverticulosis (inflamed pockets in the colon wall)    Infection    Ischemia (low blood flow)    Radiation damage    Inflammatory bowel disease (Crohn's disease or ulcerative colitis)    Ulcers in the upper GI tract and inflammation of the large intestine    Abnormal tissue growths (tumors or polyps) in the GI tract    A bulging rectum (also called a rectal prolapse)    Abnormal blood vessels in the small intestine or in the colon  Common symptoms  Common symptoms include the following:    Rectal pain, itching, or soreness    Belly pain or epigastric pain    Minor occasional drops of blood that appear on the stool or toilet paper, to greater amounts of stool that appear black or tarry   Rectal bleeding can also happen without pain.  Date Last Reviewed: 7/1/2016 2000-2018 [employer]. [address] [place] [id number]. All rights reserved. This information is not intended as a  substitute for professional medical care. Always follow your healthcare professional's instructions.           Patient Education     Diarrhea with Uncertain Cause (Adult)    Diarrhea is when stools are loose and watery. This can be caused by:    Viral infections    Bacterial infections    Food poisoning    Parasites    Irritable bowel syndrome (IBS)    Inflammatory bowel diseases such as ulcerative colitis, Crohn's disease, and celiac disease    Food intolerance, such as to lactose, the sugar found in milk and milk products    Reaction to medicines like antibiotics, laxatives, cancer drugs, and antacids  Along with diarrhea, you may also have:    Abdominal pain and cramping    Nausea and vomiting    Loss of bowel control    Fever and chills    Bloody stools  In some cases, antibiotics may help to treat diarrhea. You may have a stool sample test. This is done to see what is causing your diarrhea, and if antibiotics will help treat it. The results of a stool sample test may take up to 2 days. The healthcare provider may not give you antibiotics until he or she has the stool test results.  Diarrhea can cause dehydration. This is the loss of too much water and other fluids from the body. When this occurs, body fluid must be replaced. This can be done with oral rehydration solutions. Oral rehydration solutions are available at drugstores and grocery stores without a prescription. Sports drinks are not the best choice if you are very dehydrated. They have too much sugar and not enough electrolytes.  Home care  Follow all instructions given by your healthcare provider. Rest at home for the next 24 hours, or until you feel better. Avoid caffeine, tobacco, and alcohol. These can make diarrhea, cramping, and pain worse.  If taking medicines:    Over-the-counter nausea and diarrhea medicines are generally OK unless you experience fever or blood stool. Check with your doctor first in those circumstances.    You may use  acetaminophen or NSAID medicines like ibuprofen or naproxen to reduce pain and fever. Don t use these if you have chronic liver or kidney disease, or ever had a stomach ulcer or gastrointestinal bleeding. Don't use NSAID medicines if you are already taking one for another condition (like arthritis) or are on daily aspirin therapy (such as for heart disease or after a stroke). Talk with your healthcare provider first.    If antibiotics were prescribed, be sure you take them until they are finished. Don t stop taking them even when you feel better. Antibiotics must be taken as a full course.  To prevent the spread of illness:    Remember that washing with soap and water and using alcohol-based  is the best way to prevent the spread of infection. Dry your hands with a single use towel (like a paper towel).    Clean the toilet after each use.    Wash your hands before eating.    Wash your hands before and after preparing food. Keep in mind that people with diarrhea or vomiting should not prepare food for others.    Wash your hands after using cutting boards, countertops, and knives that have been in contact with raw foods.    Wash and then peel fruits and vegetables.    Keep uncooked meats away from cooked and ready-to-eat foods.    Use a food thermometer when cooking. Cook poultry to at least 165 F (74 C). Cook ground meat (beef, veal, pork, lamb) to at least 160 F (71 C). Cook fresh beef, veal, lamb, and pork to at least 145 F (63 C).    Don t eat raw or undercooked eggs (poached or caitlyn side up), poultry, meat, or unpasteurized milk and juices.  Food and drinks  The main goal while treating vomiting or diarrhea is to prevent dehydration. This is done by taking small amounts of liquids often.    Keep in mind that liquids are more important than food right now.    Drink only small amounts of liquids at a time.    Don t force yourself to eat, especially if you are having cramping, vomiting, or diarrhea. Don t  eat large amounts at a time, even if you are hungry.    If you eat, avoid fatty, greasy, spicy, or fried foods.    Don t eat dairy foods or drink milk if you have diarrhea. These can make diarrhea worse.  During the first 24 hours you can try:    Oral rehydration solutions.  Sports drinks may be used if you are not too dehydrated and are otherwise healthy.    Soft drinks without caffeine    Ginger ale    Water (plain or flavored)    Decaf tea or coffee    Clear broth, consommé, or bouillon    Gelatin, popsicles, or frozen fruit juice bars  The second 24 hours, if you are feeling better, you can add:    Hot cereal, plain toast, bread, rolls, or crackers    Plain noodles, rice, mashed potatoes, chicken noodle soup, or rice soup    Unsweetened canned fruit (no pineapple)    Bananas  As you recover:    Limit fat intake to less than 15 grams per day. Don t eat margarine, butter, oils, mayonnaise, sauces, gravies, fried foods, peanut butter, meat, poultry, or fish.    Limit fiber. Don t eat raw or cooked vegetables, fresh fruits except bananas, or bran cereals.    Limit caffeine and chocolate.    Limit dairy.    Don t use spices or seasonings except salt.    Go back to your normal diet over time, as you feel better and your symptoms improve.    If the symptoms come back, go back to a simple diet or clear liquids.  Follow-up care  Follow up with your healthcare provider, or as advised. If a stool sample was taken or cultures were done, call the healthcare provider for the results as instructed.  Call 911  Call 911 if you have any of these symptoms:    Trouble breathing    Confusion    Extreme drowsiness or trouble walking    Loss of consciousness    Rapid heart rate    Chest pain    Stiff neck    Seizure   When to seek medical advice  Call your healthcare provider right away if any of these occur:    Abdominal pain that gets worse    Constant lower right abdominal pain    Continued vomiting and inability to keep liquids  down    Diarrhea more than 5 times a day    Blood in vomit or stool    Dark urine or no urine for 8 hours, dry mouth and tongue, tiredness, weakness, or dizziness    Drowsiness    New rash    You don t get better in 2 to 3 days    Fever of 100.4 F (38 C) or higher, or as directed by your healthcare provider  Date Last Reviewed: 6/1/2018 2000-2018 [employer]. [address] [place] [address], [place] [address]. All rights reserved. This information is not intended as a substitute for professional medical care. Always follow your healthcare professional's instructions.
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
## 2019-04-05 ENCOUNTER — TELEPHONE (OUTPATIENT)
Dept: FAMILY MEDICINE | Facility: CLINIC | Age: 54
End: 2019-04-05

## 2019-04-05 DIAGNOSIS — E87.6 HYPOKALEMIA: Primary | ICD-10-CM

## 2019-04-05 DIAGNOSIS — R19.7 DIARRHEA, UNSPECIFIED TYPE: ICD-10-CM

## 2019-04-05 LAB
C DIFF TOX B STL QL: NEGATIVE
HCV AB SERPL QL IA: NONREACTIVE
HEMOCCULT STL QL IA: NEGATIVE
HIV 1+2 AB+HIV1 P24 AG SERPL QL IA: NONREACTIVE
SPECIMEN SOURCE: NORMAL

## 2019-04-05 RX ORDER — POTASSIUM CHLORIDE 1500 MG/1
20 TABLET, EXTENDED RELEASE ORAL 2 TIMES DAILY
Qty: 20 TABLET | Refills: 0 | Status: SHIPPED | OUTPATIENT
Start: 2019-04-05 | End: 2019-06-10

## 2019-04-05 NOTE — TELEPHONE ENCOUNTER
This writer attempted to contact Marlon on 04/05/19      Reason for call abnormal results/new Rx/hold Rx and unable to leave message - phone number rang for several minutes with no answering machine.      If patient calls back:   Registered Nurse called. Follow Triage Call workflow        Natali Kc RN

## 2019-04-05 NOTE — TELEPHONE ENCOUNTER
Please call patient-His labs are basically baseline for him but his potassium is low.  This is likely from the diarrhea.  He should hold his bumex for one week and start the potassium supplement I am prescribing now.    Trino Coy PA-C

## 2019-04-05 NOTE — LETTER
April 8, 2019      Marlon Mackey  [address] [place] [place] [address]        Dear Marlon Mackey    All of your labs were normal.      Enclosed is a copy of the results.  It was a pleasure to see you at your last appointment.    If you have any questions or concerns, please call myself or my nurse at [phone].      Sincerely,      Trino Coy PA-C/N. DARÍO Tam      Results for orders placed or performed in visit on 04/05/19   Fecal colorectal cancer screen FIT   Result Value Ref Range    Occult Blood Scn FIT Negative NEG^Negative   Clostridium difficile toxin B PCR   Result Value Ref Range    Specimen Description Feces     C Diff Toxin B PCR Negative NEG^Negative   Enteric Bacteria and Virus Panel by RADHA Stool   Result Value Ref Range    Campylobacter group by RADHA Not Detected NDET^Not Detected    Salmonella species by RADHA Not Detected NDET^Not Detected    Shigella species by RADHA Not Detected NDET^Not Detected    Vibrio group by RADHA Not Detected NDET^Not Detected    Rotavirus A by RADHA Not Detected NDET^Not Detected    Shiga toxin 1 gene by RADHA Not Detected NDET^Not Detected    Shiga toxin 2 gene by RADHA Not Detected NDET^Not Detected    Norovirus I and II by RADHA Not Detected NDET^Not Detected    Yersinia enterocolitica by RADHA Not Detected NDET^Not Detected    Enteric pathogen comment       Testing performed by multiplexed, qualitative PCR using the Spredfastigene Enteric   Pathogens Nucleic Acid Test. Results should not be used as the sole basis for diagnosis,   treatment, or other patient management decisions.     Ova and Parasite Exam Routine   Result Value Ref Range    Specimen Description Feces     Ova and Parasite Exam Routine parasitology exam negative     Ova and Parasite Exam       Cryptosporidium, Cyclospora, and Microsporidia are not readily detected by this method. A   single negative specimen does not rule out parasitic infection.

## 2019-04-08 LAB
O+P STL MICRO: NORMAL
O+P STL MICRO: NORMAL
SPECIMEN SOURCE: NORMAL

## 2019-04-08 NOTE — TELEPHONE ENCOUNTER
Updated patient with providers message and he will  the potassium today. We reviewed that he should be seen in clinic this week if not improving or feeling better.         Sridhar Yun RN, BSN

## 2019-04-08 NOTE — RESULT ENCOUNTER NOTE
Please send letter if doesn't check mychart.  Trino Coy PA-C    Mr. Mackey,    All of your labs were normal.    Please contact the clinic if you have additional questions or if symptoms persist.  Thank you.    Sincerely,    Marlon Coy

## 2019-04-12 ENCOUNTER — PATIENT OUTREACH (OUTPATIENT)
Dept: CARE COORDINATION | Facility: CLINIC | Age: 54
End: 2019-04-12

## 2019-04-12 ASSESSMENT — ACTIVITIES OF DAILY LIVING (ADL): DEPENDENT_IADLS:: TRANSPORTATION;CLEANING;LAUNDRY

## 2019-04-12 NOTE — PROGRESS NOTES
Clinic Care Coordination Contact  [facility]/Voicemail    Referral Source: PCP  Clinical Data: Care Coordinator Outreach  Noted patient was seen by primary care 4/4/19 and started on potassium and was instructed to hold Bumex X1 week due to potassium level of 2.8.  Patient was also referred to GI.    Outreach attempted x 1.  Left message on voicemail with call back information and requested return call.  Plan: Care Coordinator mailed out care coordination introduction letter on 11/2/18. Care Coordinator will try to reach patient again in 3-5 business days.    Melissa Behl BSN, RN, PHN  Primary Care Clinical RN Care Coordinator  [facility]   [phone]

## 2019-04-16 ENCOUNTER — TELEPHONE (OUTPATIENT)
Dept: FAMILY MEDICINE | Facility: CLINIC | Age: 54
End: 2019-04-16

## 2019-04-16 NOTE — TELEPHONE ENCOUNTER
Reason for call:  Medication   If this is a refill request, has the caller requested the refill from the pharmacy already? Yes  Will the patient be using a [facility]? No  Name of the pharmacy and phone number for the current request: [facility]    Name of the medication requested: pepto bismol  And medication for tooth pain/ he has dental appointment on 4/27/19    Other request:     Phone number to reach patient:  Home number on file [phone] (home)    Best Time:  any    Can we leave a detailed message on this number?  YES

## 2019-04-16 NOTE — TELEPHONE ENCOUNTER
Called patient and informed that he would need to be seen for both requested. Plan of care was to follow up in clinic 4/11/19 if diarrhea continued. Also informed that pain medications are never prescribed without an appointment.    Lesly Hayes RN, [facility] Triage

## 2019-04-17 ASSESSMENT — ACTIVITIES OF DAILY LIVING (ADL): DEPENDENT_IADLS:: TRANSPORTATION;CLEANING;LAUNDRY

## 2019-04-17 NOTE — PROGRESS NOTES
Clinic Care Coordination Contact  [facility]/Voicemail    Referral Source: PCP  Clinical Data: Care Coordinator Outreach  Outreach attempted x 2.  Left message on voicemail with call back information and requested return call.  Plan: Care Coordinator mailed out care coordination introduction letter on 11/2/18. Care Coordinator will attempt to reach patient again in 1-2 weeks.    Melissa Behl BSN, RN, PHN  Primary Care Clinical RN Care Coordinator  [facility]   [phone]

## 2019-04-26 ENCOUNTER — PATIENT OUTREACH (OUTPATIENT)
Dept: CARE COORDINATION | Facility: CLINIC | Age: 54
End: 2019-04-26

## 2019-04-26 ASSESSMENT — ACTIVITIES OF DAILY LIVING (ADL): DEPENDENT_IADLS:: TRANSPORTATION;CLEANING;LAUNDRY

## 2019-04-26 NOTE — PROGRESS NOTES
Clinic Care Coordination Contact  [facility]/Voicemail    Referral Source: PCP  Clinical Data: Care Coordinator Outreach  Outreach attempted x 3.  Phone number is no longer in service.  Plan: Care Coordinator mailed out care coordination introduction letter on 11/2/18. Care Coordinator will do no further outreaches at this time.    Melissa Behl BSN, RN, PHN  Primary Care Clinical RN Care Coordinator  [facility]   [phone]

## 2019-05-03 ENCOUNTER — PATIENT OUTREACH (OUTPATIENT)
Dept: CARE COORDINATION | Facility: CLINIC | Age: 54
End: 2019-05-03

## 2019-05-03 ASSESSMENT — ACTIVITIES OF DAILY LIVING (ADL): DEPENDENT_IADLS:: TRANSPORTATION;CLEANING;LAUNDRY

## 2019-05-03 NOTE — LETTER
[facility] Home  Complex Care Plan  About Me:    Patient Name:  Marlon Mackey    YOB: 1965  Age:         53 year old   Derek MRN:    [id number] Telephone Information:  Home Phone [phone]   Mobile [phone]       Address:  [address]  [facility] [place] [address] Email address:  No e-mail address on record      Emergency Contact(s)    Name Relationship Lgl Grd Work Phone Home Phone Mobile Phone   1. ESVIN MACKEY Mother   [phone]    2. NO SECONDARY C*    None            Primary language:  English     needed? No   [facility] [employer] [facility]:  [phone] op. 1  Other communication barriers: None  Preferred Method of Communication:  Mail  Current living arrangement: I live alone  Mobility Status/ Medical Equipment: Independent w/Device    Health Maintenance  Health Maintenance Reviewed: Due/Overdue   Health Maintenance Due   Topic Date Due     TOBACCO CESSATION COUNSELING Q1 YR  1965     PREVENTIVE CARE VISIT  1965     HF ACTION PLAN Q3 YR  1965     DEPRESSION ACTION PLAN  09/05/1982     COLON CANCER SCREEN (SYSTEM ASSIGNED)  09/05/2014     ZOSTER IMMUNIZATION (1 of 2) 09/05/2014     PHQ-9 Q6 MONTHS  06/04/2019        My Access Plan  Medical Emergency 911   Primary Clinic Line [facility] - [phone]   24 Hour Appointment Line [phone] or  [phone] (toll-free)   24 Hour Nurse Line [phone] (toll-free)   Preferred Urgent Care [facility] [phone]   [facility]  [phone]   Preferred Pharmacy [facility] [place] - [address]     Behavioral Health Crisis Line The National Suicide Prevention Lifeline at 1-453.108.6350 or 911             My Care Team Members  Patient Care Team       Relationship Specialty Notifications Start End    Amadeo, Frank Haddad MD PCP - General  Internal Medicine  10/14/15     Phone: [phone] Fax: [phone]         [address] [place] [facility] [address]    Frank Childs MD Assigned PCP   4/1/18     Phone: [phone] Fax: [phone]         [address] [place] [facility] [address]    Behl, Melissa K, RN Lead Care Coordinator Primary Care - [facility] Admissions 5/3/19     Phone: [phone] Fax: [phone]                My Care Plans  Self Management and Treatment Plan  Goals and (Comments)  Goals        General    #1 Reducing Risks (pt-stated)     Notes - Note created  5/6/2019  2:22 PM by Behl, Melissa K, RN    Goal Statement: I will stop drinking alcohol.  Measure of Success: Patient will report abstaining from alcohol.  Supportive Steps to Achieve: No longer desires to drink, wants to be around for his grandchildren.  Barriers: unknown.  Strengths: care coordination.  Date to Achieve By: 7/1/19  Patient expressed understanding of goal: yes         #2 Healthy Eating (pt-stated)     Notes - Note created  5/6/2019  2:24 PM by Behl, Melissa K, RN    Goal Statement: I will follow a low sodium diet.  Measure of Success: Patient will report consistently following a low sodium diet.  Supportive Steps to Achieve: Patient has information from hospital on a low sodium diet.  Barriers: used to eating high sodium foods  Strengths: care coordination  Date to Achieve By: 7/1/19  Patient expressed understanding of goal: yes                Action Plans on File:                       Advance Care Plans/Directives Type:        My Medical and Care Information  Problem List   Patient Active Problem List   Diagnosis     CARDIOVASCULAR SCREENING; LDL GOAL LESS THAN 130     Overweight (BMI 25.0-29.9)     Tobacco abuse     Cervical radiculopathy     Cardiomyopathy, alcoholic (H)     Tinnitus, bilateral     Chronic systolic congestive heart failure (H)     Alcoholic cirrhosis of liver with ascites (H)     Alcohol abuse     Diarrhea, unspecified type     ROSIBEL (acute  kidney injury) (H)      Current Medications and Allergies:  See printed Medication Report.    Care Coordination Start Date: 5/3/2019   Frequency of Care Coordination: weekly   Form Last Updated: 05/06/2019

## 2019-05-03 NOTE — LETTER
Health Care Home - Access Care Plan    About Me:    Patient Name:  Marlon Mackey    YOB: 1965  Age:                      53 year old   Derek MRN:     [id number] Telephone Information:   Home Phone [phone]   Mobile [phone]       Address:  [address]  [facility] [address] Email address:  No e-mail address on record      Emergency Contact(s)   Name Relationship Lgl Grd Work Phone Home Phone Mobile Phone   1. ESVIN MACKEY Mother   [phone]    2. NO SECONDARY C*    None              Health Maintenance: Routine Health maintenance Reviewed: Due/Overdue   Health Maintenance Due   Topic Date Due     TOBACCO CESSATION COUNSELING Q1 YR  1965     PREVENTIVE CARE VISIT  1965     HF ACTION PLAN Q3 YR  1965     DEPRESSION ACTION PLAN  09/05/1982     COLON CANCER SCREEN (SYSTEM ASSIGNED)  09/05/2014     ZOSTER IMMUNIZATION (1 of 2) 09/05/2014        My Access Plan  Medical Emergency 911   Questions or concerns during clinic hours Primary Clinic Line, I will call the clinic directly: [facility] [phone]   24 Hour Appointment Line [phone] or  [phone]) (toll free)   24 Hour Nurse Line [phone] (toll free)   Questions or concerns outside clinic hours 24 Hour Appointment Line, I will call the after-hours on-call line:   [facility] [phone] or [phone]) (toll-free)   Preferred Urgent Care [facility] [phone]   Preferred [facility] [place]  [phone]   Preferred Pharmacy [facility], [place] - [address]     Behavioral Health Crisis Line The National Suicide Prevention Lifeline at 1-332.306.7440 or 910                     My Care Team Members  Patient Care Team       Relationship Specialty Notifications Start End    Vocal, Frank Haddad MD PCP - General Internal Medicine  10/14/15      Phone: [phone] Fax: [phone] [address] [place] [address]    Frank Childs MD Assigned PCP   4/1/18     Phone: [phone] Fax: [phone] [address] [place] [address]    Behl, Melissa K, RN Lead Care Coordinator Primary Care - [facility]  5/3/19     Phone: [phone] Fax: [phone]               My Medical and Care Information  Problem List   Patient Active Problem List   Diagnosis     CARDIOVASCULAR SCREENING; LDL GOAL LESS THAN 130     Overweight (BMI 25.0-29.9)     Tobacco abuse     Cervical radiculopathy     Cardiomyopathy, alcoholic (H)     Tinnitus, bilateral     Chronic systolic congestive heart failure (H)     Alcoholic cirrhosis of liver with ascites (H)     Alcohol abuse     Diarrhea, unspecified type      Current Medications and Allergies:  See printed Medication Report

## 2019-05-03 NOTE — PROGRESS NOTES
Clinic Care Coordination Contact  [facility]/Voicemail    Referral Source: PCP  Clinical Data: Care Coordinator Outreach  Patient was at [facility] 4/25/19-5/2/19 for ROSIBEL, alcohol withdrawal syndrome, alcoholic cirrhosis with ascites s/p ultra sound guided paracentesis (5L removed).  Noted patient has a future appointment with Dr. Quintanilla 5/9/19.  Outreach attempted x 1.  Left message on voicemail with call back information and requested return call.  Plan: Care Coordinator will mail out care coordination introduction letter with care coordinator contact information and explanation of care coordination services. Care Coordinator will try to reach patient again in 1-2 business days.    Melissa Behl BSN, RN, PHN  Primary Care Clinical RN Care Coordinator  [facility]   [phone]

## 2019-05-03 NOTE — LETTER
[facility] CARE COORDINATION  [address] [address]    May 3, 2019    Marlon Mackey  [address]  [place] [address]      Dear Marlon,    I am a clinic care coordinator who works with Frank Childs MD at [facility].  I tried to call you following your last hospitalization, but was unable to reach you.     The clinic care coordinator is a registered nurse and/or  who understand the health care system. The goal of clinic care coordination is to help you manage your health and improve access to the [facility] in the most efficient manner. The registered nurse can assist you in meeting your health care goals by providing education, coordinating services, and strengthening the communication among your providers. The  can assist you with financial, behavioral, psychosocial, chemical dependency, counseling, and/or psychiatric resources.    Please feel free to contact me at [phone], with any questions or concerns. We at [facility] are focused on providing you with the highest-quality healthcare experience possible and that all starts with you.     Sincerely,     Melissa Behl BSN, RN, PHN  Primary Care Clinical RN Care Coordinator  [facility]   [phone]     Enclosed: I have enclosed a copy of a 24 Hour Access Plan. This has helpful phone numbers for you to call when needed. Please keep this in an easy to access place to use as needed.

## 2019-05-05 ENCOUNTER — NURSE TRIAGE (OUTPATIENT)
Dept: NURSING | Facility: CLINIC | Age: 54
End: 2019-05-05

## 2019-05-06 ENCOUNTER — OFFICE VISIT (OUTPATIENT)
Dept: FAMILY MEDICINE | Facility: CLINIC | Age: 54
End: 2019-05-06
Payer: COMMERCIAL

## 2019-05-06 ENCOUNTER — TELEPHONE (OUTPATIENT)
Dept: FAMILY MEDICINE | Facility: CLINIC | Age: 54
End: 2019-05-06

## 2019-05-06 VITALS
HEIGHT: 71 IN | SYSTOLIC BLOOD PRESSURE: 93 MMHG | HEART RATE: 84 BPM | WEIGHT: 201.8 LBS | OXYGEN SATURATION: 94 % | TEMPERATURE: 97.5 F | BODY MASS INDEX: 28.25 KG/M2 | DIASTOLIC BLOOD PRESSURE: 61 MMHG

## 2019-05-06 DIAGNOSIS — I42.6 CARDIOMYOPATHY, ALCOHOLIC (H): ICD-10-CM

## 2019-05-06 DIAGNOSIS — I50.22 CHRONIC SYSTOLIC CONGESTIVE HEART FAILURE (H): ICD-10-CM

## 2019-05-06 DIAGNOSIS — R19.7 DIARRHEA, UNSPECIFIED TYPE: ICD-10-CM

## 2019-05-06 DIAGNOSIS — F32.2 CURRENT SEVERE EPISODE OF MAJOR DEPRESSIVE DISORDER WITHOUT PSYCHOTIC FEATURES, UNSPECIFIED WHETHER RECURRENT (H): ICD-10-CM

## 2019-05-06 DIAGNOSIS — F17.200 TOBACCO USE DISORDER: ICD-10-CM

## 2019-05-06 DIAGNOSIS — K70.31 ALCOHOLIC CIRRHOSIS OF LIVER WITH ASCITES (H): Primary | ICD-10-CM

## 2019-05-06 PROBLEM — N17.9 AKI (ACUTE KIDNEY INJURY) (H): Status: ACTIVE | Noted: 2019-04-26

## 2019-05-06 PROCEDURE — 99214 OFFICE O/P EST MOD 30 MIN: CPT | Performed by: NURSE PRACTITIONER

## 2019-05-06 ASSESSMENT — ANXIETY QUESTIONNAIRES
6. BECOMING EASILY ANNOYED OR IRRITABLE: SEVERAL DAYS
IF YOU CHECKED OFF ANY PROBLEMS ON THIS QUESTIONNAIRE, HOW DIFFICULT HAVE THESE PROBLEMS MADE IT FOR YOU TO DO YOUR WORK, TAKE CARE OF THINGS AT HOME, OR GET ALONG WITH OTHER PEOPLE: VERY DIFFICULT
5. BEING SO RESTLESS THAT IT IS HARD TO SIT STILL: SEVERAL DAYS
2. NOT BEING ABLE TO STOP OR CONTROL WORRYING: MORE THAN HALF THE DAYS
7. FEELING AFRAID AS IF SOMETHING AWFUL MIGHT HAPPEN: MORE THAN HALF THE DAYS
1. FEELING NERVOUS, ANXIOUS, OR ON EDGE: MORE THAN HALF THE DAYS
GAD7 TOTAL SCORE: 13
3. WORRYING TOO MUCH ABOUT DIFFERENT THINGS: NEARLY EVERY DAY

## 2019-05-06 ASSESSMENT — ACTIVITIES OF DAILY LIVING (ADL): DEPENDENT_IADLS:: TRANSPORTATION;CLEANING;LAUNDRY

## 2019-05-06 ASSESSMENT — PATIENT HEALTH QUESTIONNAIRE - PHQ9
SUM OF ALL RESPONSES TO PHQ QUESTIONS 1-9: 19
5. POOR APPETITE OR OVEREATING: MORE THAN HALF THE DAYS

## 2019-05-06 ASSESSMENT — MIFFLIN-ST. JEOR: SCORE: 1769.55

## 2019-05-06 NOTE — PATIENT INSTRUCTIONS
At [facility], we strive to deliver an exceptional experience to you, every time we see you.  If you receive a survey in the mail, please send us back your thoughts. We really do value your feedback.    Your care team:                            Family Medicine Internal Medicine   MD Frank Barkley MD Shantel Branch-Fleming, MD Katya Georgiev PA-C Megan Hill, APRN CNP    Kaveh Quintanilla, MD Pediatrics   Trino Coy, CHRISTY Barnes, MD Gypsy Ulloa APRN CNP   MD Hyacinth Pepper MD Deborah Mielke, MD Leeann Tang, APRN [facility]      Clinic hours: Monday - Thursday 7 am-7 pm; Fridays 7 am-5 pm.   Urgent care: Monday - Friday 11 am-9 pm; Saturday and Sunday 9 am-5 pm.  Pharmacy : Monday -Thursday 8 am-8 pm; Friday 8 am-6 pm; Saturday and Sunday 9 am-5 pm.     Clinic: [phone]   Pharmacy: [phone]        Patient Education     Cirrhosis    The liver is found on the right side of your belly (abdomen). It is just below the rib cage. The liver has many important jobs. It removes toxins from the blood. It also helps your blood clot to stop bleeding. Cirrhosis happens when the liver is scarred or injured. This damage is permanent. It can cause your liver to stop working (liver failure).   The most common causes of cirrhosis are long-term heavy alcohol use and having hepatitis B or C. Other causes include nonalcoholic steatohepatitis ([place] or fatty liver disease), hemochromatosis, toxins, certain medicines, and certain viruses.  Common symptoms of cirrhosis include:    Tiredness or weakness    Loss of appetite    Nausea and vomiting    Easy bleeding and bruising    Swelling of the belly (abdomen)    Weight loss    Yellowing of the eyes or skin (jaundice)    Itching    Confusion  Treatment helps ease symptoms and prevent more liver damage. You may also get treatment to fight the hepatitis virus. Quitting alcohol will help slow down the disease  getting worse. It may also prevent more complications. If cirrhosis gets worse and becomes life threatening, you may need a liver transplant.   Home care    Don't take medicines that can make liver damage worse. Your healthcare provider will tell you if any of the medicines you now take need to be changed. Talk with your provider or pharmacist before taking any medicine not prescribed. These include dietary supplements and herbs. Some of these may make liver damage worse.    Talk with your healthcare provider about medicines that have acetaminophen or NSAIDs such as ibuprofen and naproxen. These can also harm your liver.     Stop drinking alcohol. If you find it hard to stop drinking, seek professional help. Consider joining Alcoholics Anonymous or another type of treatment program for support.    If you use IV drugs, you are at high risk for hepatitis B and C. Seek help to stop.     Be sure to ask your healthcare provider about recommended vaccines. These include vaccines for viruses that can cause liver disease.  Follow-up care  Follow up with your healthcare provider, or as advised.  For more information and to learn about support groups for people with liver disease, contact:    American Liver Foundation, www.liverfoundation.org, 739.266.1353    Hepatitis [employer] International, www.hepfi.org, 444.229.7418  When to seek medical advice  Call your healthcare provider right away if you have any of the following:    Rapid weight gain with increased size of your belly (abdomen) or leg swelling    Yellow color of your skin or eyes (jaundice) gets worse    Excess bleeding from cuts or injuries  Date Last Reviewed: 6/1/2017 2000-2018 [employer]. [address] [place] [address], [place] [address]. All rights reserved. This information is not intended as a substitute for professional medical care. Always follow your healthcare professional's instructions.

## 2019-05-06 NOTE — TELEPHONE ENCOUNTER
Marlon reports severe swelling to feet and ankles after being up for a couple of hours at home.    He was recently hospitalized and had a post discharge ER visit.    He feels the swelling to his feet and ankles is worse than when he was recently hospitalized.    Per protocol, advised to be seen by provider within 24 hours.    Transferred to scheduling.    Becka Irene RN  [facility]          Reason for Disposition    [1] Very swollen joint AND [2] no fever    Additional Information    Negative: Difficulty breathing    Negative: Entire foot is cool or blue in comparison to other side    Negative: Fever    Negative: Patient sounds very sick or weak to the triager    Negative: [1] SEVERE pain (e.g., excruciating, unable to walk) AND [2] not improved after 2 hours of pain medicine    Negative: [1] Can't move swollen joint at all AND [2] no fever    Negative: [1] Redness AND [2] painful when touched AND [3] no fever    Negative: [1] Red area or streak [2] large (> 2 in. or 5 cm)    Negative: [1] Thigh or calf pain AND [2] only 1 side AND [3] present > 1 hour    Negative: [1] Thigh, calf, or ankle swelling AND [2] only 1 side    Negative: [1] Thigh, calf, or ankle swelling AND [2] bilateral AND [3] 1 side is more swollen    Protocols used: ANKLE SWELLING-A-AH

## 2019-05-06 NOTE — PROGRESS NOTES
SUBJECTIVE:   Marlon Mackey is a 53 year old male who presents to clinic today for the following   health issues:        ED/UC Followup:    Facility:  [facility]  Date of visit: 5/5/19  Reason for visit: Abdominal Pain  Current Status: still having pain       Abdominal Pain      Duration: 5/5/19    Description (location/character/radiation): everywherer       Associated flank pain: None    Intensity:  moderate, severe    Accompanying signs and symptoms:        Fever/Chills: YES- chills       Gas/Bloating: YES       Nausea/vomitting: no        Diarrhea: YES       Dysuria or Hematuria: YES- hematuria    History (previous similar pain/trauma/previous testing):     Precipitating or alleviating factors:       Pain worse with eating/BM/urination: no       Pain relieved by BM: no     Therapies tried and outcome: lactulose    LMP:  not applicable  US ABDOMEN LIMITED5/1/2019  [facility]   Result Impression   IMPRESSION: Minimal free peritoneal fluid.   Result Narrative   Exam: Abdominal ultrasound, limited    INDICATION: Abdominal distention and discomfort. We are asked to perform a therapeutic paracentesis.    PROCEDURE: Ultrasound of the abdomen was performed. Paracentesis was not performed due to minimal fluid present in the peritoneal cavity.    FINDINGS: There is minimal free peritoneal fluid. There is edema of the abdominal wall noted on ultrasound.   Other Result Information   Interface, Nmhcradordrslt In - 05/01/2019  3:39 PM CDT  Exam: Abdominal ultrasound, limited    INDICATION: Abdominal distention and discomfort. We are asked to perform a therapeutic paracentesis.    PROCEDURE: Ultrasound of the abdomen was performed. Paracentesis was not performed due to minimal fluid present in the peritoneal cavity.    FINDINGS: There is minimal free peritoneal fluid. There is edema of the abdominal wall noted on ultrasound.    IMPRESSION  IMPRESSION: Minimal free peritoneal fluid.     Result  Narrative   Interpretation Summary    * The left ventricular systolic function is normal, estimated LVEF 55-60%.    * Compared to prior study dated 2017 (TTE), the LV systolic function  and the mitral regurgitation have significantly improved.    * There is no hemodynamically significant valvular heart disease.      Patient Info  Name:     ELSY FOSTER  Age:     53 years  :     1965  Gender:     Male  MRN:     [id number]  Accession #:     [id number]  Ht:     180 cm  Wt:     88 kg  BSA:     2.11 m2  Systolic BP:     89 mmHg  Diastolic BP:     60 mmHg  Heart Rhythm:     Normal sinus rhythm  Technical Quality:     Diagnostic quality  Exam Date/Time:     2019 1:19 PM  Exam Location:     [facility]  Patient Status:     Inpatient  Admit Date:     2019  Exam Type:     ECHOCARDIOGRAM W/DEFINITY/OPTISON    Staff  Ordering Physician:     CIRILO CURRY  Sonographer:     Tisha BATES [facility]  Cardiologist:     Mac Mayes MD;    Study Info  Indications      I42.9 - Cardiomyopathy,  unspecified  Procedure    Complete two-dimensional, color flow and Doppler transthoracic  echocardiogram is performed with contrast.  Comments    Contrast agent was administered to enhance endocardial definition.    Procedure Details  Contrast/Agitated Saline    ------------------------------  Agent:     Optison  Echo Contrast Reaction:     None    Account #:     [id number]    Primary Care Provider:     Frank Childs MD      Findings  Left Ventricle    The left ventricular systolic function is normal, estimated LVEF 55-60%.    The left ventricle is mildly enlarged.    Left ventricular wall motion is normal.    There is borderline concentric left ventricular hypertrophy.  Right Ventricle    The right ventricle is normal size.    Normal right ventricular systolic function.  Diastolic Function/Strain    The left ventricular diastolic function is mildly abnormal (Grade I), may be  normal variant for age.      Left Atrium    The  left atrium is normal size.    Right Atrium    The right atrium is normal size.      Aortic Valve    The aortic valve is trileaflet.    There is no aortic valve sclerosis. There is no aortic stenosis.    There is no significant aortic regurgitation.    Pulmonary Valve    The pulmonic valve is not well visualized.    There is no Doppler evidence of pulmonic valve sclerosis or stenosis.    There is no significant pulmonic regurgitation.    Mitral Valve    The mitral valve leaflets are mildly thickened.    There is no significant mitral stenosis.    There is no significant mitral regurgitation.    Tricuspid Valve    The tricuspid valve leaflets are structurally normal.    There is no significant tricuspid stenosis.    There is no significant tricuspid regurgitation.      Hemodynamics    The IVC was not well visualized, cannot accurately assess RA pressure.    Due to inadequate tricuspid regurgitant velocity, unable to calculate right  ventricular systolic pressure.    Septae    There is no evidence of ASD/PFO by color Doppler; however no bubble study  was performed.    Pericardium/Pleura    There is no pericardial effusion.    Vessels    The aortic measurements are indexed to age and body surface area.    The aortic root is normal in size measuring 3.4 cm.    The proximal ascending aorta is normal in size measuring 3.0 cm.      2D Measurements  ----------------------------------------------------------------------  Name                                 Value        Normal  ----------------------------------------------------------------------    Parasternal 2D  ----------------------------------------------------------------------  IVS Diastolic Thickness (2D)        1.1 cm       0.6-1.0   LVID Diastole (2D)                  6.2 cm       4.2-5.8   LVIW Diastolic Thickness  (2D)                                1.1 cm       0.6-1.0   LVID Systole (2D)                   3.5 cm       2.5-4.0   LVOT  Diameter                       2.4 cm                 LV Fractional Shortening  (2D)                                  43 %         25-43   LA Dimension (2D)                   4.7 cm       3.0-4.1   Sinus of Valsalva Diameter          3.4 cm       3.1-3.7   Prox Asc Ao Diameter                3.0 cm       2.6-3.4   Ao Annulus Diameter                 2.3 cm       2.3-2.9     LV Ejection Fraction 2D  ----------------------------------------------------------------------  LV EF (4C MOD)                        58 %                 LV EF (2C MOD)                        53 %                 LV EF (BP MOD)                        55 %         52-72   LV End Diastolic Volume (BP  A-L)                              198.0 ml                 LV End Systolic Volume (BP  A-L)                               87.1 ml                   Apical 2D Dimensions  ----------------------------------------------------------------------  RV Basal Diastolic Dimension        3.9 cm       2.5-4.1   RV Mid-Cavity Diastolic  Dimension                           4.0 cm       1.9-3.5   LA Volume (4C MOD)                 58.8 ml                 LA Volume (2C MOD)                 48.2 ml                 LA Volume (BP MOD)               53.50 cm3                 LA Volume Index (BP MOD)       25.38 ml/m2   16.00-34.00   RA Area (4C)                      16.3 cm2        <=18.0    M-mode Measurements  ----------------------------------------------------------------------  Name                                 Value        Normal  ----------------------------------------------------------------------    M-Mode  ----------------------------------------------------------------------  TAPSE                              28.2 mm        >=17.0    LVOT/Aortic Valve Doppler Measurements  ----------------------------------------------------------------------  Name                                  Value        Normal  ----------------------------------------------------------------------    LVOT Doppler  ----------------------------------------------------------------------  LVOT VTI                           20.8 cm                 LVOT Peak Gradient                4.4 mmHg                 LVOT Mean Gradient                2.3 mmHg                   AoV Doppler  ----------------------------------------------------------------------  AV VTI                             29.0 cm                 AV Peak Velocity                150.7 cm/s                 AV Peak Gradient                  9.1 mmHg                 AV Mean Gradient                  4.5 mmHg                 AV Area (Cont Eq Anthony)              3.2 cm2                 AV Area Index (Cont Eq Anthony)     1.5 cm2/m2                 AV Area (Cont Eq VTI)              3.2 cm2         >=3.0   AV Area Index (Cont Eq VTI)     1.5 cm2/m2                 Dimensionless Index VTI               0.72    Mitral Valve Measurements  ----------------------------------------------------------------------  Name                                 Value        Normal  ----------------------------------------------------------------------    MV Doppler  ----------------------------------------------------------------------  MV E Peak Velocity               67.9 cm/s                 MV A Peak Velocity               84.4 cm/s                 MV E/A                                 0.8                 MV Decel Time                       206 ms          >200   MV PHT                               60 ms                 MV Area (PHT)                      3.7 cm2       4.0-6.0     MV Annular TDI  ----------------------------------------------------------------------  MV Septal e' Velocity             7.5 cm/s         >=8.0   MV E/e' (Septal)                       9.0         <=8.0   MV Lateral e' Velocity            8.4 cm/s        >=10.0   MV E/e' (Lateral)                      8.1          <=8.0    Tricuspid Valve Measurements  ----------------------------------------------------------------------  Name                                 Value        Normal  ----------------------------------------------------------------------    TV Annular TDI  ----------------------------------------------------------------------  TV Lateral Crystal s' Velocity       16.0 cm/s      9.5-18.7      Report Signatures  Finalized by Iván Camacho MD on 2019 03:22 PM   Other Result Information   Interface, Nmhcradordrslt In - 2019  3:23 PM CDT  Interpretation Summary    * The left ventricular systolic function is normal, estimated LVEF 55-60%.    * Compared to prior study dated 2017 (TTE), the LV systolic function  and the mitral regurgitation have significantly improved.    * There is no hemodynamically significant valvular heart disease.      Patient Info  Name:     ELSY FOSTER  Age:     53 years  :     1965  Gender:     Male  MRN:     [id number]  Accession #:     [id number]  Ht:     180 cm  Wt:     88 kg  BSA:     2.11 m2  Systolic BP:     89 mmHg  Diastolic BP:     60 mmHg  Heart Rhythm:     Normal sinus rhythm  Technical Quality:     Diagnostic quality  Exam Date/Time:     2019 1:19 PM  Exam Location:     [facility]  Patient Status:     Inpatient  Admit Date:     2019  Exam Type:     ECHOCARDIOGRAM W/DEFINITY/OPTISON    Staff  Ordering Physician:     CIRILO CURRY  Sonographer:     Tisha BATES RDCS  Cardiologist:     Mac Mayes MD;    Study Info  Indications      I42.9 - Cardiomyopathy,  unspecified  Procedure    Complete two-dimensional, color flow and Doppler transthoracic  echocardiogram is performed with contrast.  Comments    Contrast agent was administered to enhance endocardial definition.    Procedure Details  Contrast/Agitated Saline    ------------------------------  Agent:     Optison  Echo Contrast Reaction:     None    Account #:     [id number]    Primary Care  Provider:     Frank Childs MD      Findings  Left Ventricle    The left ventricular systolic function is normal, estimated LVEF 55-60%.    The left ventricle is mildly enlarged.    Left ventricular wall motion is normal.    There is borderline concentric left ventricular hypertrophy.  Right Ventricle    The right ventricle is normal size.    Normal right ventricular systolic function.  Diastolic Function/Strain    The left ventricular diastolic function is mildly abnormal (Grade I), may be  normal variant for age.      Left Atrium    The left atrium is normal size.    Right Atrium    The right atrium is normal size.      Aortic Valve    The aortic valve is trileaflet.    There is no aortic valve sclerosis. There is no aortic stenosis.    There is no significant aortic regurgitation.    Pulmonary Valve    The pulmonic valve is not well visualized.    There is no Doppler evidence of pulmonic valve sclerosis or stenosis.    There is no significant pulmonic regurgitation.    Mitral Valve    The mitral valve leaflets are mildly thickened.    There is no significant mitral stenosis.    There is no significant mitral regurgitation.    Tricuspid Valve    The tricuspid valve leaflets are structurally normal.    There is no significant tricuspid stenosis.    There is no significant tricuspid regurgitation.      Hemodynamics    The IVC was not well visualized, cannot accurately assess RA pressure.    Due to inadequate tricuspid regurgitant velocity, unable to calculate right  ventricular systolic pressure.    Septae    There is no evidence of ASD/PFO by color Doppler; however no bubble study  was performed.    Pericardium/Pleura    There is no pericardial effusion.    Vessels    The aortic measurements are indexed to age and body surface area.    The aortic root is normal in size measuring 3.4 cm.    The proximal ascending aorta is normal in size measuring 3.0 cm.      2D  Measurements  ----------------------------------------------------------------------  Name                                 Value        Normal  ----------------------------------------------------------------------    Parasternal 2D  ----------------------------------------------------------------------  IVS Diastolic Thickness (2D)        1.1 cm       0.6-1.0   LVID Diastole (2D)                  6.2 cm       4.2-5.8   LVIW Diastolic Thickness  (2D)                                1.1 cm       0.6-1.0   LVID Systole (2D)                   3.5 cm       2.5-4.0   LVOT Diameter                       2.4 cm                 LV Fractional Shortening  (2D)                                  43 %         25-43   LA Dimension (2D)                   4.7 cm       3.0-4.1   Sinus of Valsalva Diameter          3.4 cm       3.1-3.7   Prox Asc Ao Diameter                3.0 cm       2.6-3.4   Ao Annulus Diameter                 2.3 cm       2.3-2.9     LV Ejection Fraction 2D  ----------------------------------------------------------------------  LV EF (4C MOD)                        58 %                 LV EF (2C MOD)                        53 %                 LV EF (BP MOD)                        55 %         52-72   LV End Diastolic Volume (BP  A-L)                              198.0 ml                 LV End Systolic Volume (BP  A-L)                               87.1 ml                   Apical 2D Dimensions  ----------------------------------------------------------------------  RV Basal Diastolic Dimension        3.9 cm       2.5-4.1   RV Mid-Cavity Diastolic  Dimension                           4.0 cm       1.9-3.5   LA Volume (4C MOD)                 58.8 ml                 LA Volume (2C MOD)                 48.2 ml                 LA Volume (BP MOD)               53.50 cm3                 LA Volume Index (BP MOD)       25.38 ml/m2   16.00-34.00   RA Area (4C)                      16.3 cm2        <=18.0    M-mode  Measurements  ----------------------------------------------------------------------  Name                                 Value        Normal  ----------------------------------------------------------------------    M-Mode  ----------------------------------------------------------------------  TAPSE                              28.2 mm        >=17.0    LVOT/Aortic Valve Doppler Measurements  ----------------------------------------------------------------------  Name                                 Value        Normal  ----------------------------------------------------------------------    LVOT Doppler  ----------------------------------------------------------------------  LVOT VTI                           20.8 cm                 LVOT Peak Gradient                4.4 mmHg                 LVOT Mean Gradient                2.3 mmHg                   AoV Doppler  ----------------------------------------------------------------------  AV VTI                             29.0 cm                 AV Peak Velocity                150.7 cm/s                 AV Peak Gradient                  9.1 mmHg                 AV Mean Gradient                  4.5 mmHg                 AV Area (Cont Eq Anthony)              3.2 cm2                 AV Area Index (Cont Eq Anthony)     1.5 cm2/m2                 AV Area (Cont Eq VTI)              3.2 cm2         >=3.0   AV Area Index (Cont Eq VTI)     1.5 cm2/m2                 Dimensionless Index VTI               0.72    Mitral Valve Measurements  ----------------------------------------------------------------------  Name                                 Value        Normal  ----------------------------------------------------------------------    MV Doppler  ----------------------------------------------------------------------  MV E Peak Velocity               67.9 cm/s                 MV A Peak Velocity               84.4 cm/s                 MV E/A                                 0.8                  MV Decel Time                       206 ms          >200   MV PHT                               60 ms                 MV Area (PHT)                      3.7 cm2       4.0-6.0     MV Annular TDI  ----------------------------------------------------------------------  MV Septal e' Velocity             7.5 cm/s         >=8.0   MV E/e' (Septal)                       9.0         <=8.0   MV Lateral e' Velocity            8.4 cm/s        >=10.0   MV E/e' (Lateral)                      8.1         <=8.0    Tricuspid Valve Measurements  ----------------------------------------------------------------------  Name                                 Value        Normal  ----------------------------------------------------------------------    TV Annular TDI  ----------------------------------------------------------------------  TV Lateral Crystal s' Velocity       16.0 cm/s      9.5-18.7      Report Signatures  Finalized by Iván Camacho MD on 4/26/2019 03:22 PM   Status Results Details     Endoscopy4/30/2019  [facility]   Other Result Information   This result has an attachment that is not available.   Result Narrative   [facility]  Patient Name: Marlon Mackey  Procedure Date: 4/30/2019 2:42 PM  MRN: [id number]  Account Number: [id number]  YOB: 1965  Note Status: Finalized  Attending MD: Abdullahi Pantoja MD  Procedure:       Upper GI endoscopy  Indications:       Iron deficiency anemia due to suspected upper gastrointestinal bleeding  Providers:       Abdullahi Pantoja MD, Georgette Uriostegui RN, Shayla Kwan RN, Shahla Churchill  Requesting Provider:  Medicines:       Monitored Anesthesia Care  Complications:       No immediate complications.  Procedure:       Pre-Anesthesia Assessment:       - Prior to the procedure, a History and Physical was performed, and        patient medications and allergies were reviewed. The patient is        competent. The risks and  benefits of the procedure and the sedation        options and risks were discussed with the patient. All questions were        answered and informed consent was obtained. Patient identification and        proposed procedure were verified by the physician in the pre-procedure        area in the procedure room. Mental Status Examination: alert and        oriented. Airway Examination: normal oropharyngeal airway and neck        mobility and Mallampati Class II (the uvula but not tonsillar pillars        visualized). Respiratory Examination: clear to auscultation. CV        Examination: normal. Prophylactic Antibiotics: The patient does not        require prophylactic antibiotics. Prior Anticoagulants: The patient has        taken no previous anticoagulant or antiplatelet agents. ASA Grade        Assessment: III - A patient with severe systemic disease. After        reviewing the risks and benefits, the patient was deemed in satisfactory        condition to undergo the procedure. The anesthesia plan was to use        monitored anesthesia care (MAC). Immediately prior to administration of        medications, the patient was re-assessed for adequacy to receive        sedatives. The heart rate, respiratory rate, oxygen saturations, blood        pressure, adequacy of pulmonary ventilation, and response to care were        monitored throughout the procedure. The physical status of the patient        was re-assessed after the procedure.       Informed consent was obtained after discussion of the procedure        including its risks of perforation, bleeding, potential need for        surgery, potential complications associated with conscious sedation, and        possibility of an incomplete procedure or missed lesion. The endoscope        was passed under direct vision. Throughout the procedure, the patient's        blood pressure, pulse, and oxygen saturations were monitored        continuously. The Endoscope was introduced  through the mouth, and        advanced to the third part of duodenum. The upper GI endoscopy was        accomplished without difficulty. The patient tolerated the procedure        well.  Findings:       One superficial esophageal ulcer with no bleeding and no stigmata of        recent bleeding was found at the gastroesophageal junction. The lesion        was 7 mm in largest dimension. Biopsies were taken with a cold forceps        for histology.       Multiple dispersed, small non-bleeding erosions were found in the        gastric antrum. There were no stigmata of recent bleeding. Biopsies were        taken with a cold forceps for histology.       Few non-bleeding superficial duodenal ulcers with a clean ulcer base        (Kj Class III) were found in the first portion of the duodenum and        in the second portion of the duodenum. The largest lesion was 5 mm in        largest dimension.  Impression:       - One non-bleeding esophageal ulcer. Biopsied.       - Non-bleeding erosive gastropathy. Biopsied.       - Multiple non-bleeding duodenal ulcers with a clean ulcer base (Kj        Class III).  Recommendation:       - Await pathology results.       - Continue with PPI bid.       - Avoid NSAIDS.  Procedure Code(s):       --- Professional ---       28260, Esophagogastroduodenoscopy, flexible, transoral; with biopsy,        single or multiple  Diagnosis Code(s):       --- Professional ---       K22.10, Ulcer of esophagus without bleeding       K31.89, Other diseases of stomach and duodenum       K26.9, Duodenal ulcer, unspecified as acute or chronic, without        hemorrhage or perforation       D50.9, Iron deficiency anemia, unspecified  CPT copyright 2016 [employer]. All rights reserved.  The codes documented in this report are preliminary and upon  review may   be revised to meet current compliance requirements.  MD Abdullahi Lawson MD  4/30/2019 4:50:16  PM  Number of Addenda: 0  Note Initiated On: 4/30/2019 2:42 PM       [address] [id number]         Musculoskeletal problem/pain      Duration: 3-4 days    Description  Location: both ankles, abdomen and legs and thighs    Intensity:  moderate, severe    Accompanying signs and symptoms: swelling and itchy    History  Previous similar problem: no   Previous evaluation:  none    Precipitating or alleviating factors:  Trauma or overuse: no   Aggravating factors include: constant    Therapies tried and outcome: nothing    Patient has history of alcohol abuse, alcoholic cirrhosis of the liver with ascites and CHF who follows back after having been seen  Yesterday n the ER for ascites and abdominal bloating. He was admitted to [facility] 4/25-/19-5/2/19 for alcohol withdrawal and alcoholic cirrhosis with ascites.  He had US guided paracentesis with 5 L of fluid taken off.  The fluid was negative for infection. He complained of abdominal pain after the paracentesis and US showed no ascites but had abdominal wall edema thought to be due to anasarca.  He continues to have abdominal fullness/pain, is requesting paracentesis. He denies shortness of breath, palpitations, increased symptoms when lying flat.  He sleeps on 1 pillow. No nausea, vomiting, or constipation.  H is having a few small stools daily, continues on Lactulose 15mg BID. He had multiple labs drawn yesterday at [facility]- patient was at his baseline.  .    He also complains of worsening edema of LE.     Additional history: as documented    Reviewed  and updated as needed this visit by clinical staff  Tobacco  Allergies  Meds  Med Hx  Surg Hx  Fam Hx  Soc Hx        Reviewed and updated as needed this visit by Provider         Patient Active Problem List   Diagnosis     CARDIOVASCULAR SCREENING; LDL GOAL LESS THAN 130     Overweight (BMI 25.0-29.9)     Tobacco abuse     Cervical radiculopathy     Cardiomyopathy, alcoholic (H)     Tinnitus,  "bilateral     Chronic systolic congestive heart failure (H)     Alcoholic cirrhosis of liver with ascites (H)     Alcohol abuse     Diarrhea, unspecified type     ROSIBEL (acute kidney injury) (H)     Past Surgical History:   Procedure Laterality Date     ORTHOPEDIC SURGERY Right     arm       Social History     Tobacco Use     Smoking status: Current Every Day Smoker     Packs/day: 1.00     Smokeless tobacco: Never Used   Substance Use Topics     Alcohol use: Yes     Comment: weekly     Family History   Problem Relation Age of Onset     Cancer Father      Family History Negative Other          Current Outpatient Medications   Medication Sig Dispense Refill     bismuth subsalicylate (PEPTO BISMOL) 262 MG/15ML suspension Take 15 mLs by mouth every 6 hours as needed for indigestion 180 mL 0     bumetanide (BUMEX) 1 MG tablet Take 1 mg by mouth daily Take with Spironolactone.       carvedilol (COREG) 3.125 MG tablet TAKE 1 TABLET BY MOUTH TWICE DAILY WITH MEALS 60 tablet 11     potassium chloride ER (K-DUR/KLOR-CON M) 20 MEQ CR tablet Take 1 tablet (20 mEq) by mouth 2 times daily 20 tablet 0     ramipril (ALTACE) 2.5 MG capsule TAKE ONE CAPSULE BY MOUTH TWICE DAILY. TAKE WITH CARVEDILOL AND BUMETANIDE 60 capsule 7     ramipril (ALTACE) 2.5 MG capsule Take 1 capsule (2.5 mg) by mouth daily 90 capsule 3     tamsulosin (FLOMAX) 0.4 MG capsule Take 1 capsule (0.4 mg) by mouth daily 14 capsule 0     BP Readings from Last 3 Encounters:   05/06/19 93/61   04/04/19 (!) 73/34   02/19/19 (!) 89/61    Wt Readings from Last 3 Encounters:   05/06/19 91.5 kg (201 lb 12.8 oz)   04/04/19 79.8 kg (176 lb)   02/19/19 79.8 kg (176 lb)                    ROS:  Constitutional, HEENT, cardiovascular, pulmonary, gi and gu systems are negative, except as otherwise noted.    OBJECTIVE:     BP 93/61   Pulse 84   Temp 97.5  F (36.4  C) (Oral)   Ht 1.791 m (5' 10.5\")   Wt 91.5 kg (201 lb 12.8 oz)   SpO2 94%   BMI 28.55 kg/m    Body mass index " "is 28.55 kg/m .  GENERAL: healthy, alert and no distress  EYES: Eyes grossly normal to inspection, PERRL and conjunctivae and sclerae normal  HENT: ear canals and TM's normal, nose and mouth without ulcers or lesions  NECK: no adenopathy, no asymmetry, masses, or scars and thyroid normal to palpation  RESP: lungs clear to auscultation - no rales, rhonchi or wheezes  CV: regular rate and rhythm, normal S1 S2, no S3 or S4, no murmur, click or rub, 1+ peripheral edema and peripheral pulses strong  ABDOMEN: soft, abdominal distension,bowel sounds normal, no tenderness  MS: no gross musculoskeletal defects noted, no edema  SKIN: no suspicious lesions or rashes  NEURO: Normal strength and tone, mentation intact and speech normal  BACK: no CVA tenderness, no paralumbar tenderness  PSYCH: mentation appears normal, affect normal/bright  LYMPH: normal ant/post cervical, supraclavicular nodes    Diagnostic Test Results:  none     ASSESSMENT/PLAN:         BMI:   Estimated body mass index is 28.55 kg/m  as calculated from the following:    Height as of this encounter: 1.791 m (5' 10.5\").    Weight as of this encounter: 91.5 kg (201 lb 12.8 oz).         1. Alcoholic cirrhosis of liver with ascites (H)  Referring to GI for management of his cirrhosis.  Stressed the importance of complete abstinence from alcohol.  He states he last had a drink 2 weeks ago (1 beer).  - GASTROENTEROLOGY ADULT REF CONSULT ONLY    2. Cardiomyopathy, alcoholic (H)  Stable on Coreg, Ramipril and Potassium.    3. Chronic systolic congestive heart failure (H)  Stable on Coreg, Ramipril and Potassium.     4. 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  Patient attributes his high scores to recent hospitalization and the realization that he really needs to quit drinking and that his health has suffered from his drinking.  He has a new grandchild and is excited about " this- denies SI/HI, and wants to get healthy so he can see this child grow up.    5. Diarrhea, unspecified type  OK to use Pepto prn for indigestion/diarrhea.    6. Tobacco use disorder  Patient is not interested in quitting at this time.  We can help him quit when he is ready.  - TOBACCO CESSATION ORDER FOR HM    See Patient Instructions    SAMY Mullen [facility]

## 2019-05-06 NOTE — TELEPHONE ENCOUNTER
Reason for call:  Symptom   Symptom or request: bloated    Duration (how long have symptoms been present): days  Have you been treated for this before? Yes    Additional comments: patient seen today and is calling back because he is still bloated and uncomfortable  He would like a nurse call back    Phone number to reach patient:  Cell number on file:    Telephone Information:   Mobile [phone]       Best Time:  any    Can we leave a detailed message on this number?  YES

## 2019-05-06 NOTE — PROGRESS NOTES
"Clinic Care Coordination Contact    Clinic Care Coordination Contact  OUTREACH    Referral Information:  Referral Source: PCP    Primary Diagnosis: GI Disorders    Chief Complaint   Patient presents with     Clinic Care Coordination - Post Hospital     RN        Universal Utilization: Patient utilizes [facility], was at ED 5/5/19  Clinic Utilization  Difficulty keeping appointments:: No  Compliance Concerns: Yes  No-Show Concerns: No  No PCP office visit in Past Year: No  Utilization    Last refreshed: 5/6/2019 12:54 PM:  Hospital Admissions 0           Last refreshed: 5/6/2019 12:54 PM:  ED Visits 0           Last refreshed: 5/6/2019 12:54 PM:  No Show Count (past year) 2              Current as of: 5/6/2019 12:54 PM              Clinical Concerns:  Current Medical Concerns:  Patient here to see provider for hospital follow up.  Patient was at [facility] 4/25/19-5/2/19 for acute kidney injury, alcohol withdrawal syndrome, alcoholism, alcohol cirrhosis with ascites status post ultrasoundguided paracentesis (5 liters removed), thrombocytopenia, elevated liver enzymes, macrocytic anemia, hyperammonemia, chronic systolic CHF, anasarca, chest pain.  Patient was then in the ED yesterday for ascites due to alcoholic cirrhosis, but was instructed to follow up with his PCP versus continuing paracentesis through the ED.    RN CC reviewed hospital discharge instructions with patient.  Patient did not understand what a renal diet meant, RN CC reviewed this with patient and then clarified with Leeann MAN whether it was necessary for patient to continue this.  Patient will not need to continue a renal diet at this time, but will need to continue a low sodium diet.  Patient admitted this will be difficult for him, \"I've always ate wrong and drank beer.\"  Patient made a goal to begin following a low sodium diet.        Current Behavioral Concerns: Patient admitted that this past hospitalization \"scared me " "straight\" into stopping alcohol.  Patient states he had a beer upon discharge, but has had no more, \"it's not worth it, I hope I didn't ruin the rest of my life.\"  RN CC discussed support groups such as AA or individual therapy.  Patient declined at this time.        Education Provided to patient: RN CC educated patient on importance of alcohol cessation, low sodium diet and hospital discharge instructions.     Pain  Pain (GOAL):: Yes  Type: Chronic (>3mo)  Location of chronic pain:: back, foot, right foot  Radiating: No  Progression: Unchanged  Description of pain: Aching, Throbbing  Health Maintenance Reviewed: Due/Overdue   Health Maintenance Due   Topic Date Due     TOBACCO CESSATION COUNSELING Q1 YR  1965     PREVENTIVE CARE VISIT  1965     HF ACTION PLAN Q3 YR  1965     DEPRESSION ACTION PLAN  09/05/1982     COLON CANCER SCREEN (SYSTEM ASSIGNED)  09/05/2014     ZOSTER IMMUNIZATION (1 of 2) 09/05/2014     PHQ-9 Q6 MONTHS  06/04/2019      Clinical Pathway: None    Medication Management:  Patient independent in medication management and verbalizes adherence and understanding of medication regimen.       Functional Status:  Dependent ADLs:: Wheelchair-independent  Dependent IADLs:: Transportation, Cleaning, Laundry  Bed or wheelchair confined:: Yes  Mobility Status: Independent w/Device    RN CC inquired about patients lift chair.  Patient states he had not received it yet.  RN CC spoke with Kirsty from [facility] ([phone]) who informed writer they are awaiting forms from patient's county  as of Friday 5/3/19.  As soon as these forms are received patient will receive his lift chair.    Living Situation:  Current living arrangement:: I live alone  Type of residence:: Town home    Diet/Exercise/Sleep:  Diet:: No added salt, Low saturated fat  Inadequate nutrition (GOAL):: No  Food Insecurity: No  Tube Feeding: No  Exercise:: Unable to exercise  Inadequate activity/exercise (GOAL):: " No  Significant changes in sleep pattern (GOAL): No    Transportation:     Transportation means:: Medical transport, Public transportation, Family, Friend     Psychosocial:  Pentecostalism or spiritual beliefs that impact treatment:: No  Mental health DX:: No  Mental health management concern (GOAL):: No  Informal Support system:: Parent, Family, Friends, Children     Financial/Insurance:   Financial/Insurance concerns (GOAL):: No       Resources and Interventions:  Current Resources:    ;   Community Resources: PCA(Pt's daughter is his PCA)  Supplies used at home:: None  Equipment Currently Used at Home: wheelchair, power    Advance Care Plan/Directive  Advanced Care Plans/Directives on file:: No  Advanced Care Plan/Directive Status: Not Applicable    Referrals Placed: None     Goals:   Goals        General    #1 Reducing Risks (pt-stated)     Notes - Note created  5/6/2019  2:22 PM by Behl, Melissa K, RN    Goal Statement: I will stop drinking alcohol.  Measure of Success: Patient will report abstaining from alcohol.  Supportive Steps to Achieve: No longer desires to drink, wants to be around for his grandchildren.  Barriers: unknown.  Strengths: care coordination.  Date to Achieve By: 7/1/19  Patient expressed understanding of goal: yes         #2 Healthy Eating (pt-stated)     Notes - Note created  5/6/2019  2:24 PM by Behl, Melissa K, RN    Goal Statement: I will follow a low sodium diet.  Measure of Success: Patient will report consistently following a low sodium diet.  Supportive Steps to Achieve: Patient has information from hospital on a low sodium diet.  Barriers: used to eating high sodium foods  Strengths: care coordination  Date to Achieve By: 7/1/19  Patient expressed understanding of goal: yes                 Patient/Caregiver understanding: Patient has limited understanding of the severity of his disease process.     Outreach Frequency: weekly      Plan:   1. Patient will await call to schedule with  hepatology, as being ordered by today's provider.  2. Patient will not drink alcohol.  3. Patient will follow a low sodium diet.  4. Patient will await call from [facility] lift chair.  5. RN CC will follow up with patient later this week.    Melissa Behl BSN, RN, PHN  Primary Care Clinical RN Care Coordinator  [facility]   [phone]

## 2019-05-07 ASSESSMENT — ANXIETY QUESTIONNAIRES: GAD7 TOTAL SCORE: 13

## 2019-05-08 DIAGNOSIS — K70.31 ALCOHOLIC CIRRHOSIS OF LIVER WITH ASCITES (H): Primary | ICD-10-CM

## 2019-05-09 NOTE — PROGRESS NOTES
Per recommendation of Dr. Pate, ASAP referral to [facility] hepatology placed. He  Needs evaluation for possible transplant/onging management.    Elizabeth Tang  APRN, CNP

## 2019-05-10 ENCOUNTER — PATIENT OUTREACH (OUTPATIENT)
Dept: CARE COORDINATION | Facility: CLINIC | Age: 54
End: 2019-05-10

## 2019-05-10 ASSESSMENT — ACTIVITIES OF DAILY LIVING (ADL): DEPENDENT_IADLS:: TRANSPORTATION;CLEANING;LAUNDRY

## 2019-05-10 NOTE — PROGRESS NOTES
Clinic Care Coordination Contact  [facility]/Voicemail    Referral Source: PCP  Clinical Data: Care Coordinator Outreach  Outreach attempted x 1.  Left message on voicemail with call back information and requested return call.  Plan: Care Coordinator mailed out care coordination introduction letter on 5/3/19. Care Coordinator will try to reach patient again in 1-2 business days.        Clark Jacob MSN, RN, PHN, CCM   Primary Care Clinical RN Care Coordinator  [facility]   [email or web address]  Office: [phone]

## 2019-05-13 ENCOUNTER — DOCUMENTATION ONLY (OUTPATIENT)
Dept: CARE COORDINATION | Facility: CLINIC | Age: 54
End: 2019-05-13

## 2019-05-13 ENCOUNTER — OFFICE VISIT (OUTPATIENT)
Dept: GASTROENTEROLOGY | Facility: CLINIC | Age: 54
End: 2019-05-13
Attending: NURSE PRACTITIONER
Payer: COMMERCIAL

## 2019-05-13 VITALS
OXYGEN SATURATION: 97 % | DIASTOLIC BLOOD PRESSURE: 63 MMHG | TEMPERATURE: 98.1 F | SYSTOLIC BLOOD PRESSURE: 101 MMHG | WEIGHT: 193.4 LBS | HEART RATE: 72 BPM | BODY MASS INDEX: 27.07 KG/M2 | HEIGHT: 71 IN

## 2019-05-13 DIAGNOSIS — K70.31 ALCOHOLIC CIRRHOSIS OF LIVER WITH ASCITES (H): ICD-10-CM

## 2019-05-13 DIAGNOSIS — K70.30 ALCOHOLIC CIRRHOSIS OF LIVER WITHOUT ASCITES (H): Primary | ICD-10-CM

## 2019-05-13 DIAGNOSIS — R39.9 LOWER URINARY TRACT SYMPTOMS (LUTS): ICD-10-CM

## 2019-05-13 LAB
ALBUMIN SERPL-MCNC: 2.5 G/DL (ref 3.4–5)
ALP SERPL-CCNC: 125 U/L (ref 40–150)
ALT SERPL W P-5'-P-CCNC: 23 U/L (ref 0–70)
ANION GAP SERPL CALCULATED.3IONS-SCNC: 6 MMOL/L (ref 3–14)
AST SERPL W P-5'-P-CCNC: 57 U/L (ref 0–45)
BILIRUB DIRECT SERPL-MCNC: 0.7 MG/DL (ref 0–0.2)
BILIRUB SERPL-MCNC: 1.2 MG/DL (ref 0.2–1.3)
BUN SERPL-MCNC: 11 MG/DL (ref 7–30)
CALCIUM SERPL-MCNC: 8.6 MG/DL (ref 8.5–10.1)
CHLORIDE SERPL-SCNC: 104 MMOL/L (ref 94–109)
CO2 SERPL-SCNC: 26 MMOL/L (ref 20–32)
CREAT SERPL-MCNC: 1.14 MG/DL (ref 0.66–1.25)
ERYTHROCYTE [DISTWIDTH] IN BLOOD BY AUTOMATED COUNT: 14.3 % (ref 10–15)
GFR SERPL CREATININE-BSD FRML MDRD: 72 ML/MIN/{1.73_M2}
GLUCOSE SERPL-MCNC: 96 MG/DL (ref 70–99)
HCT VFR BLD AUTO: 28.7 % (ref 40–53)
HGB BLD-MCNC: 9.5 G/DL (ref 13.3–17.7)
INR PPP: 1.43 (ref 0.86–1.14)
MCH RBC QN AUTO: 37.7 PG (ref 26.5–33)
MCHC RBC AUTO-ENTMCNC: 33.1 G/DL (ref 31.5–36.5)
MCV RBC AUTO: 114 FL (ref 78–100)
PLATELET # BLD AUTO: 215 10E9/L (ref 150–450)
POTASSIUM SERPL-SCNC: 4 MMOL/L (ref 3.4–5.3)
PROT SERPL-MCNC: 7 G/DL (ref 6.8–8.8)
RBC # BLD AUTO: 2.52 10E12/L (ref 4.4–5.9)
SODIUM SERPL-SCNC: 137 MMOL/L (ref 133–144)
WBC # BLD AUTO: 7.6 10E9/L (ref 4–11)

## 2019-05-13 PROCEDURE — 80048 BASIC METABOLIC PNL TOTAL CA: CPT | Performed by: INTERNAL MEDICINE

## 2019-05-13 PROCEDURE — 85610 PROTHROMBIN TIME: CPT | Performed by: INTERNAL MEDICINE

## 2019-05-13 PROCEDURE — 80076 HEPATIC FUNCTION PANEL: CPT | Performed by: INTERNAL MEDICINE

## 2019-05-13 PROCEDURE — 85027 COMPLETE CBC AUTOMATED: CPT | Performed by: INTERNAL MEDICINE

## 2019-05-13 PROCEDURE — G0463 HOSPITAL OUTPT CLINIC VISIT: HCPCS | Mod: ZF

## 2019-05-13 PROCEDURE — 36415 COLL VENOUS BLD VENIPUNCTURE: CPT | Performed by: INTERNAL MEDICINE

## 2019-05-13 ASSESSMENT — MIFFLIN-ST. JEOR: SCORE: 1739.39

## 2019-05-13 ASSESSMENT — PAIN SCALES - GENERAL: PAINLEVEL: EXTREME PAIN (8)

## 2019-05-13 ASSESSMENT — ACTIVITIES OF DAILY LIVING (ADL): DEPENDENT_IADLS:: TRANSPORTATION;CLEANING;LAUNDRY
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
## 2019-05-13 NOTE — TELEPHONE ENCOUNTER
"Requested Prescriptions   Pending Prescriptions Disp Refills     tamsulosin (FLOMAX) 0.4 MG capsule  Last Written Prescription Date:  4/4/19  Last Fill Quantity: 14,  # refills: 0   Last Office Visit with [facility], [facility] or [facility] prescribing provider:  5/13/19   Future Office Visit:      14 capsule 0     Sig: Take 1 capsule (0.4 mg) by mouth daily       Alpha Blockers Passed - 5/13/2019  3:01 PM        Passed - Blood pressure under 140/90 in past 12 months     BP Readings from Last 3 Encounters:   05/06/19 93/61   04/04/19 (!) 73/34   02/19/19 (!) 89/61                 Passed - Recent (12 mo) or future (30 days) visit within the authorizing provider's specialty     Patient had office visit in the last 12 months or has a visit in the next 30 days with authorizing provider or within the authorizing provider's specialty.  See \"Patient Info\" tab in inbasket, or \"Choose Columns\" in Meds & Orders section of the refill encounter.              Passed - Patient does not have Tadalafil, Vardenafil, or Sildenafil on their medication list        Passed - Medication is active on med list        Passed - Patient is 18 years of age or older          "
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
## 2019-05-13 NOTE — PROGRESS NOTES
Clinic Care Coordination Contact    Clinic Care Coordination Contact  OUTREACH    Referral Information:  Referral Source: PCP    Primary Diagnosis: GI Disorders    Chief Complaint   Patient presents with     Clinic Care Coordination - Post Hospital     RN ED f/u        [place] Utilization: Patient [place] [facility] [place], was at ED 5/5/19 and 5/9/19    Clinic Utilization  Difficulty keeping appointments:: No  Compliance Concerns: Yes  No-Show Concerns: No  No PCP office visit in Past Year: No  Utilization    Last refreshed: 5/13/2019  4:23 AM:  Hospital Admissions 0           Last refreshed: 5/13/2019  4:23 AM:  ED Visits 0           Last refreshed: 5/13/2019  4:23 AM:  No Show Count (past year) 2              Current as of: 5/13/2019  4:23 AM              Clinical Concerns:  Current Medical Concerns:  Patient was at [facility] ED 5/9/19 for worsening chronic bilateral lower leg edema.  Patient answered the phone and stated he didn't have time to talk because he needed to get ready to see his GI specialist today.  Patient did inform writer that he has received his lift chair.         Current Behavioral Concerns: Patient is continuing to work on sobriety.      Education Provided to patient: none at this time.     Pain  Pain (GOAL):: Yes  Type: Chronic (>3mo)  Location of chronic pain:: back, foot, right foot  Radiating: No  Progression: Unchanged  Description of pain: Aching, Throbbing  Health Maintenance Reviewed: Due/Overdue   Health Maintenance Due   Topic Date Due     PREVENTIVE CARE VISIT  1965     HF ACTION PLAN Q3 YR  1965     DEPRESSION ACTION PLAN  09/05/1982     COLON CANCER SCREEN (SYSTEM ASSIGNED)  09/05/2014     ZOSTER IMMUNIZATION (1 of 2) 09/05/2014     LIPID MONITORING Q1 YEAR  06/07/2019      Clinical Pathway: None    Medication Management:  Patient independent in medication management and verbalizes adherence and understanding of medication regimen.       Functional  Status:  Dependent ADLs:: Wheelchair-independent  Dependent IADLs:: Transportation, Cleaning, Laundry  Bed or wheelchair confined:: Yes  Mobility Status: Independent w/Device    Living Situation:  Current living arrangement:: I live alone  Type of residence:: Town home    Diet/Exercise/Sleep:  Diet:: No added salt, Low saturated fat  Inadequate nutrition (GOAL):: No  Food Insecurity: No  Tube Feeding: No  Exercise:: Unable to exercise  Inadequate activity/exercise (GOAL):: No  Significant changes in sleep pattern (GOAL): No    Transportation:     Transportation means:: Medical transport, Public transportation, Family, Friend     Psychosocial:  Anabaptism or spiritual beliefs that impact treatment:: No  Mental health DX:: No  Mental health management concern (GOAL):: No  Informal Support system:: Parent, Family, Friends, Children     Financial/Insurance:   Financial/Insurance concerns (GOAL):: No       Resources and Interventions:  Current Resources:    ;   Community Resources: PCA(Pt's daughter is his PCA)  Supplies used at home:: None  Equipment Currently Used at Home: wheelchair, power    Advance Care Plan/Directive  Advanced Care Plans/Directives on file:: No  Advanced Care Plan/Directive Status: Not Applicable    Referrals Placed: None     Goals:   Goals        General    #1 Reducing Risks (pt-stated)     Notes - Note created  5/6/2019  2:22 PM by Behl, Melissa K, RN    Goal Statement: I will stop drinking alcohol.  Measure of Success: Patient will report abstaining from alcohol.  Supportive Steps to Achieve: No longer desires to drink, wants to be around for his grandchildren.  Barriers: unknown.  Strengths: care coordination.  Date to Achieve By: 7/1/19  Patient expressed understanding of goal: yes         #2 Healthy Eating (pt-stated)     Notes - Note created  5/6/2019  2:24 PM by Behl, Melissa K, RN    Goal Statement: I will follow a low sodium diet.  Measure of Success: Patient will report consistently  following a low sodium diet.  Supportive Steps to Achieve: Patient has information from hospital on a low sodium diet.  Barriers: used to eating high sodium foods  Strengths: care coordination  Date to Achieve By: 7/1/19  Patient expressed understanding of goal: yes                 Patient/Caregiver understanding: Patient has limited understanding of the severity of his disease process.       Outreach Frequency: weekly  Future Appointments              Today  LAB  Health Lab, [facility]    Today Wenceslao Urena MD [facility] Hepatology, [facility]          Plan:   1. Patient will establish with hepatology today.  2. Patient will not drink alcohol.  3. Patient will follow a low sodium diet.  4. RN CC will follow up with patient in 1-2 weeks.    Melissa Behl BSN, RN, PHN  Primary Care Clinical RN Care Coordinator  [facility]   [phone]

## 2019-05-13 NOTE — NURSING NOTE
"Chief Complaint   Patient presents with     Consult     Alcoholic cirrhosis of liver with ascites      /63   Pulse 72   Temp 98.1  F (36.7  C) (Oral)   Ht 1.803 m (5' 11\")   Wt 87.7 kg (193 lb 6.4 oz)   SpO2 97%   BMI 26.97 kg/m    Jacqueline Gleason CMA    "

## 2019-05-14 ASSESSMENT — ACTIVITIES OF DAILY LIVING (ADL): DEPENDENT_IADLS:: TRANSPORTATION;CLEANING;LAUNDRY

## 2019-05-14 NOTE — PROGRESS NOTES
Clinic Care Coordination Contact  Care Team Conversations    Patient left a voicemail for writer at 11:58 am requesting a call back.  Noted patient established with hepatology yesterday.  RN CC attempted to return patient's call x3 with no answer.  Voicemail is not set up.  Writer will be out of office later this week.  Writer will follow up with patient as previously planned, but remain available as needed in the event patient calls back.    Melissa Behl BSN, RN, PHN  Primary Care Clinical RN Care Coordinator  [facility]   [phone]

## 2019-05-15 ENCOUNTER — TELEPHONE (OUTPATIENT)
Dept: FAMILY MEDICINE | Facility: CLINIC | Age: 54
End: 2019-05-15

## 2019-05-15 ENCOUNTER — PATIENT OUTREACH (OUTPATIENT)
Dept: CARE COORDINATION | Facility: CLINIC | Age: 54
End: 2019-05-15

## 2019-05-15 DIAGNOSIS — M16.0 PRIMARY OSTEOARTHRITIS OF BOTH HIPS: ICD-10-CM

## 2019-05-15 DIAGNOSIS — M17.0 PRIMARY OSTEOARTHRITIS OF BOTH KNEES: Primary | ICD-10-CM

## 2019-05-15 DIAGNOSIS — I42.8 NONISCHEMIC CARDIOMYOPATHY (H): ICD-10-CM

## 2019-05-15 RX ORDER — TAMSULOSIN HYDROCHLORIDE 0.4 MG/1
0.4 CAPSULE ORAL DAILY
Qty: 14 CAPSULE | Refills: 0 | Status: SHIPPED | OUTPATIENT
Start: 2019-05-15 | End: 2019-06-10

## 2019-05-15 ASSESSMENT — ACTIVITIES OF DAILY LIVING (ADL): DEPENDENT_IADLS:: TRANSPORTATION;CLEANING;LAUNDRY

## 2019-05-15 NOTE — TELEPHONE ENCOUNTER
Dioni Grace,      Wheelchair and Mobility referral was ordered.    Thanks,  Kaveh Quintanilla MD

## 2019-05-15 NOTE — PROGRESS NOTES
"Clinic Care Coordination Contact    Clinic Care Coordination Contact  OUTREACH    Referral Information:  Referral Source: PCP    Primary Diagnosis: GI Disorders    Chief Complaint   Patient presents with     Clinic Care Coordination - Follow-up     RN        [place] Utilization: Patient established with hepatology 5/13/19  Clinic Utilization  Difficulty keeping appointments:: No  Compliance Concerns: Yes  No-Show Concerns: No  No PCP office visit in Past Year: No  Utilization    Last refreshed: 5/14/2019  6:49 PM:  Hospital Admissions 0           Last refreshed: 5/14/2019  6:49 PM:  ED Visits 0           Last refreshed: 5/14/2019  6:49 PM:  No Show Count (past year) 2              Current as of: 5/14/2019  6:49 PM              Clinical Concerns:  Current Medical Concerns:  Patient established with hepatology 5/13/19.  Patient informed writer his daughter was over last night to review the discharge instructions.  Patient states his biggest obstacle is his diet, as he has been able to abstain from alcohol.  RN CC inquired if patient would like to meet with a nutritionist to come up with meal ideas and options for him and patient declined, \"I'd just like to be left alone when it comes to that.  I'll think about it.\"    Patient then inquired about a new power chair.  Patient called writer to state his power scooter is \"really old\" and he is requesting a new one.  Patient reports severe pain whenever he rides his power scooter due to the lack of suspension and hard tires.  Message sent to primary care providers in 5/15/19 telephone encounter.  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      Education Provided to patient: RN CC reviewed 5/13/19 office visit instructions with patient and nutrition referral option with patient.     Pain  Pain (GOAL):: Yes  Type: Chronic (>3mo)  Location of chronic pain:: \"everywhere\"  Radiating: No  Progression: Worsening  Chronic pain severity:: 8(\"I don't even know\")  Limitation of routine activities due to chronic pain:: Yes  Description: Able to do moderate activities  Alleviating Factors: Rest  Aggravating Factors: Activity(riding on current power scooter)  Health Maintenance Reviewed: Due/Overdue   Health Maintenance Due   Topic Date Due     PREVENTIVE CARE VISIT  1965     HF ACTION PLAN Q3 YR  1965     DEPRESSION ACTION PLAN  09/05/1982     COLON CANCER SCREEN (SYSTEM ASSIGNED)  09/05/2014     ZOSTER IMMUNIZATION (1 of 2) 09/05/2014     LIPID MONITORING Q1 YEAR  06/07/2019      Clinical Pathway: None    Medication Management:  Patient independent in medication management and verbalizes adherence and understanding of medication regimen.       Functional Status:  Dependent ADLs:: Wheelchair-independent  Dependent IADLs:: Transportation, Cleaning, Laundry  Bed or wheelchair confined:: Yes  Mobility Status: Independent w/Device    Living Situation:  Current living arrangement:: I live alone  Type of residence:: Town home    Diet/Exercise/Sleep:  Diet:: No added salt, Low saturated fat  Inadequate nutrition (GOAL):: No  Food Insecurity: No  Tube Feeding: No  Exercise:: Unable to exercise  Inadequate activity/exercise (GOAL):: No  Significant changes in sleep pattern (GOAL): No    Transportation:     Transportation means:: Medical transport, Public transportation, Family, Friend     Psychosocial:  Mandaeism or spiritual beliefs that impact " treatment:: No  Mental health DX:: No  Mental health management concern (GOAL):: No  Informal Support system:: Parent, Family, Friends, Children     Financial/Insurance:   Financial/Insurance concerns (GOAL):: No     Resources and Interventions:  Current Resources:    ;   Community Resources: PCA(Pt's daughter is his PCA)  Supplies used at home:: None  Equipment Currently Used at Home: wheelchair, power    Advance Care Plan/Directive  Advanced Care Plans/Directives on file:: No  Advanced Care Plan/Directive Status: Not Applicable    Referrals Placed: None     Goals:   Goals        General    #1 Reducing Risks (pt-stated)     Notes - Note created  5/6/2019  2:22 PM by Behl, Melissa K, RN    Goal Statement: I will stop drinking alcohol.  Measure of Success: Patient will report abstaining from alcohol.  Supportive Steps to Achieve: No longer desires to drink, wants to be around for his grandchildren.  Barriers: unknown.  Strengths: care coordination.  Date to Achieve By: 7/1/19  Patient expressed understanding of goal: yes         #2 Healthy Eating (pt-stated)     Notes - Note created  5/6/2019  2:24 PM by Behl, Melissa K, RN    Goal Statement: I will follow a low sodium diet.  Measure of Success: Patient will report consistently following a low sodium diet.  Supportive Steps to Achieve: Patient has information from hospital on a low sodium diet.  Barriers: used to eating high sodium foods  Strengths: care coordination  Date to Achieve By: 7/1/19  Patient expressed understanding of goal: yes         #3 Functional (pt-stated)     Notes - Note created  5/15/2019 12:30 PM by Behl, Melissa K, RN    Goal Statement: I want a new power scooter.  Measure of Success: Patient will obtain a new power scooter.  Supportive Steps to Achieve: RN CC will inquire about a new order.  Barriers: unknown  Strengths: care coordination  Date to Achieve By: 8/1/19  Patient expressed understanding of goal: yes                 Patient/Caregiver  understanding:  Patient has limited understanding of the severity of his disease process.        Outreach Frequency: weekly  Future Appointments              In 1 month [id number] LULY Wilhelm    In 6 months Wenceslao Urena MD  Health Hepatology, [facility]          Plan:   1. Patient will not drink alcohol.  2. Patient will continue to work on following a low sodium diet.  3. RN CC sent a telephone encounter to primary care provider regarding patient's request for a new power chair.  4. RN CC will follow up with patient in 2-3 weeks.    Melissa Behl BSN, RN, PHN  Primary Care Clinical RN Care Coordinator  [facility]   [phone]

## 2019-05-15 NOTE — TELEPHONE ENCOUNTER
RN CC attempted to return patient's call, however, there was no answer and voicemail set up.    Melissa Behl BSN, RN, PHN  Primary Care Clinical RN Care Coordinator  [facility]   [phone]

## 2019-05-15 NOTE — TELEPHONE ENCOUNTER
Prescription approved per [facility] Refill Protocol.      Sridhar Yun RN, BSN, PHN

## 2019-05-15 NOTE — TELEPHONE ENCOUNTER
"Patient called writer to state his power scooter is \"really old\" and he is requesting a new one.  Patient reports severe pain whenever he rides his power scooter due to the lack of suspension and hard tires.  Please advise if patient may have a referral to Wheelchair Seating and Mobility or of any other recommendations.    Melissa Behl BSN, RN, PHN  Primary Care Clinical RN Care Coordinator  [facility]   [phone]     "

## 2019-05-17 NOTE — TELEPHONE ENCOUNTER
The RN CC attempted to return the call from the patient, however, there was no answer and the voicemail has not been set up.    Clark Jacob MSN, RN, PHN, [facility]   Primary Care Clinical RN Care Coordinator  [facility]   [email or web address]  Office: [phone]

## 2019-05-20 NOTE — TELEPHONE ENCOUNTER
RN CC informed patient of [place] Wheelchair and Mobility referral.  Patient took down phone number and verbalized understanding.    Melissa Behl BSN, RN, PHN  Primary Care Clinical RN Care Coordinator  [facility]   [phone]

## 2019-05-31 ENCOUNTER — PATIENT OUTREACH (OUTPATIENT)
Dept: CARE COORDINATION | Facility: CLINIC | Age: 54
End: 2019-05-31

## 2019-05-31 ASSESSMENT — ACTIVITIES OF DAILY LIVING (ADL): DEPENDENT_IADLS:: TRANSPORTATION;CLEANING;LAUNDRY

## 2019-05-31 NOTE — PROGRESS NOTES
Clinic Care Coordination Contact  [facility]/Voicemail    Referral Source: PCP  Clinical Data: Care Coordinator Outreach  Outreach attempted x 1.  Left message on voicemail with call back information and requested return call.  Plan: Care Coordinator mailed out care coordination introduction letter on 5/3/19. Care Coordinator will try to reach patient again in 1-2 weeks.    Melissa Behl BSN, RN, PHN  Primary Care Clinical RN Care Coordinator  [facility]   [phone]

## 2019-06-06 ASSESSMENT — ACTIVITIES OF DAILY LIVING (ADL): DEPENDENT_IADLS:: TRANSPORTATION;CLEANING;LAUNDRY

## 2019-06-06 NOTE — PROGRESS NOTES
Clinic Care Coordination Contact  [facility]/Voicemail    Referral Source: PCP  Clinical Data: Care Coordinator Outreach  Outreach attempted x 2.  Left message on voicemail with call back information and requested return call.  Plan: Care Coordinator mailed out care coordination introduction letter on 5/3/19. Care Coordinator will try to reach patient again in 1-2 weeks.    Melissa Behl BSN, RN, PHN  Primary Care Clinical RN Care Coordinator  [facility]   [phone]

## 2019-06-10 ENCOUNTER — OFFICE VISIT (OUTPATIENT)
Dept: FAMILY MEDICINE | Facility: CLINIC | Age: 54
End: 2019-06-10
Payer: COMMERCIAL

## 2019-06-10 ENCOUNTER — PATIENT OUTREACH (OUTPATIENT)
Dept: CARE COORDINATION | Facility: CLINIC | Age: 54
End: 2019-06-10

## 2019-06-10 VITALS
TEMPERATURE: 97.4 F | BODY MASS INDEX: 25.79 KG/M2 | HEART RATE: 75 BPM | WEIGHT: 184.2 LBS | RESPIRATION RATE: 18 BRPM | DIASTOLIC BLOOD PRESSURE: 52 MMHG | OXYGEN SATURATION: 98 % | SYSTOLIC BLOOD PRESSURE: 81 MMHG | HEIGHT: 71 IN

## 2019-06-10 DIAGNOSIS — Z13.220 SCREENING FOR HYPERLIPIDEMIA: ICD-10-CM

## 2019-06-10 DIAGNOSIS — K22.10 ULCER OF ESOPHAGUS WITHOUT BLEEDING: ICD-10-CM

## 2019-06-10 DIAGNOSIS — K26.9 DUODENAL ULCER WITHOUT HEMORRHAGE OR PERFORATION AND WITHOUT OBSTRUCTION: ICD-10-CM

## 2019-06-10 DIAGNOSIS — K70.31 ALCOHOLIC CIRRHOSIS OF LIVER WITH ASCITES (H): Primary | ICD-10-CM

## 2019-06-10 DIAGNOSIS — R39.9 LOWER URINARY TRACT SYMPTOMS (LUTS): ICD-10-CM

## 2019-06-10 DIAGNOSIS — I42.6 CARDIOMYOPATHY, ALCOHOLIC (H): ICD-10-CM

## 2019-06-10 PROCEDURE — 99214 OFFICE O/P EST MOD 30 MIN: CPT | Performed by: PHYSICIAN ASSISTANT

## 2019-06-10 RX ORDER — PANTOPRAZOLE SODIUM 40 MG/1
40 TABLET, DELAYED RELEASE ORAL DAILY
COMMUNITY
End: 2019-06-10

## 2019-06-10 RX ORDER — MULTIVITAMIN/IRON/FOLIC ACID 18MG-0.4MG
1 TABLET ORAL
COMMUNITY
Start: 2019-05-03 | End: 2020-04-06

## 2019-06-10 RX ORDER — CARVEDILOL 3.12 MG/1
TABLET ORAL
Qty: 180 TABLET | Refills: 1 | Status: SHIPPED | OUTPATIENT
Start: 2019-06-10 | End: 2020-04-09

## 2019-06-10 RX ORDER — SPIRONOLACTONE 25 MG/1
25 TABLET ORAL
COMMUNITY
End: 2019-06-10

## 2019-06-10 RX ORDER — PANTOPRAZOLE SODIUM 40 MG/1
40 TABLET, DELAYED RELEASE ORAL DAILY
Qty: 90 TABLET | Refills: 1 | Status: SHIPPED | OUTPATIENT
Start: 2019-06-10 | End: 2020-04-09

## 2019-06-10 RX ORDER — MULTIVIT-MIN/IRON FUM/FOLIC AC 7.5 MG-4
1 TABLET ORAL DAILY
Qty: 90 TABLET | Refills: 1 | Status: SHIPPED | OUTPATIENT
Start: 2019-06-10 | End: 2020-04-09

## 2019-06-10 RX ORDER — LACTULOSE 10 G/15ML
20 SOLUTION ORAL
COMMUNITY
Start: 2019-06-07 | End: 2020-04-09

## 2019-06-10 RX ORDER — TAMSULOSIN HYDROCHLORIDE 0.4 MG/1
0.4 CAPSULE ORAL DAILY
Qty: 90 CAPSULE | Refills: 1 | Status: SHIPPED | OUTPATIENT
Start: 2019-06-10 | End: 2020-04-09

## 2019-06-10 RX ORDER — TAMSULOSIN HYDROCHLORIDE 0.4 MG/1
0.4 CAPSULE ORAL DAILY
Qty: 14 CAPSULE | Refills: 0 | Status: CANCELLED | OUTPATIENT
Start: 2019-06-10

## 2019-06-10 RX ORDER — SPIRONOLACTONE 25 MG/1
25 TABLET ORAL DAILY
Qty: 90 TABLET | Refills: 1 | Status: SHIPPED | OUTPATIENT
Start: 2019-06-10 | End: 2020-03-13

## 2019-06-10 ASSESSMENT — PAIN SCALES - GENERAL: PAINLEVEL: EXTREME PAIN (8)

## 2019-06-10 ASSESSMENT — MIFFLIN-ST. JEOR: SCORE: 1697.66

## 2019-06-10 ASSESSMENT — ACTIVITIES OF DAILY LIVING (ADL): DEPENDENT_IADLS:: TRANSPORTATION;CLEANING;LAUNDRY

## 2019-06-10 NOTE — PATIENT INSTRUCTIONS
Patient Education     Ascites    Ascites (pronounced [id number]) is fluid collecting in the abdomen (stomach area). Symptoms include swelling of the abdomen and a feeling of pressure. Shortness of breath may also occur. In severe cases, the feet, ankles and legs may also swell.   There are many causes of ascites. The most common are related to the liver. They include:    Cirrhosis of the liver, which may be due to hepatitis B, hepatitis C, long-term alcohol abuse, nonalcoholic steatohepatitis ([place]) and others    Diseases such as heart failure, kidney failure, pancreatitis, or cancer  To treat the condition, a low-salt diet may be recommended. Medicines that help fluid leave the body (diuretics) may be prescribed. In some cases, a procedure is done to drain the abdomen of fluid. This is called paracentesis. Unless the underlying cause is treated, the fluid is likely to return.  If liver damage is due to alcohol, stopping all alcohol will help slow the progress of the disease. If liver damage is from hepatitis B or C, treatments may be given to fight the virus. If liver damage becomes life threatening, a liver transplant may be needed.  Home care    Certain medicines can worsen liver damage. Talk to your healthcare provider or pharmacist about any medicines you currently take. Ask your healthcare provider or pharmacist before taking any new medicines. Also ask before taking herbs, vitamins, or minerals. Certain ones affect the liver.    Do not taking acetaminophen or ibuprofen without taking to your healthcare provider first. Both can affect your liver.     Stop all alcohol use. If you abuse alcohol, talk to your healthcare provider about getting help and support to stop.     If you use IV drugs, seek help to stop. Never share needles or other equipment.      If you have cirrhosis from diabetes, obesity, or metabolic syndrome (associated with non-alcoholic steatohepatitis), treatment of the underlying condition  is critical. So is exercise and weight loss.  Follow-up care  Follow up with your healthcare provider as advised. If a culture was done on the ascitic fluid, or imaging, or other labs were done, call as directed for the results. Depending on the results, your treatment may change.  The following sources can tell you more about ascites and help you find support.    American Liver Foundation 330-638-3699 www.liverfoundation.org    Hepatitis Foundation International www.hepfi.org    Alcoholics Anonymous www.aa.org    National [place] on Alcoholism and Drug Dependence 568-398-4192 www.ncadd.org  When to seek medical advice  Call your healthcare provider right away if you have any of the following:    Sudden weight gain with increased size of your abdomen or leg swelling    Increasing jaundice (yellowing of skin or eyes)    Excess bleeding from cuts or injuries    Blood in vomit or stool (black or red color)    Trouble breathing    Increasing abdominal pain    Confusion    Fever of 100.4 F (38 C) or higher, or as directed by your healthcare provider  Date Last Reviewed: 6/1/2017 2000-2018 [employer]. [address] [place] [address]. All rights reserved. This information is not intended as a substitute for professional medical care. Always follow your healthcare professional's instructions.           Patient Education     Alcohol Abuse  Alcoholic drinks are harmful when you have too many of them. There is no set number of drinks that defines too much. Drinking that disrupts your life or your health is called alcohol abuse. Alcohol abuse can hurt your relationships with others. You may lose friends, a spouse, or even your job. You may be abusing alcohol if any of the following are true for you:    Duties at home or with  suffer because of drinking.    Duties at work or in school suffer because of drinking.    You have missed work or school because of drinking.    You use alcohol while  "driving or operating machinery.    You have legal problems such as arrests due to drinking.    You keep drinking even though it causes serious problems in your life.  Health effects  Alcohol abuse causes health problems. Sometimes this can happen after only drinking a  little.\" There is no set number of drinks or amount of alcohol that defines too much. The more you drink at one time, and the more often you drink determine both the short-term and long-term health effects. It affects all parts of your body and your health, including your:    Brain. Alcohol is a central nervous system depressant. It can damage parts of the brain that affect your balance, memory, thinking, and emotions. It can cause memory loss, blackouts, depression, agitation, sleep cycle changes, and seizures. These changes may or may not be reversible.    Heart and vascular system. Alcohol affects multiple areas. It can damage heart muscle causing cardiomyopathy, which is a weakening and stretching of the heart muscle. This can lead to trouble breathing, an irregular heartbeat, atrial fibrillation, leg swelling, and heart failure. Alcohol use makes the blood vessels stiffen causing high blood pressure. All of these problems increase your risk of having heart attacks or strokes.    Liver. Alcohol causes fat to build up in the liver, affecting its normal function. This increases the risk for hepatitis, leading to abdominal pain, appetite loss, jaundice, bleeding problems, liver fibrosis, and cirrhosis. This, in turn, can affect your ability to fight off infections, and can cause diabetes. The liver changes prevent it from removing toxins in your blood that can cause encephalopathy, which may show with confusion, altered level of consciousness, personality changes, memory loss, seizures, coma, and death.    Pancreas. Alcohol can cause inflammation of the pancreas, or pancreatitis. This can cause abdominal pain, fever, and diabetes.    Immune system. " Alcohol weakens your immune system in a number of ways. It suppresses your immune system making it harder to fight infections and colds. It also increases the chance of getting pneumonia and tuberculosis.    Cancer. Alcohol is a risk factor for developing cancer of the mouth, esophagus, pharynx, larynx, liver, and breast.    Sexual function. Alcohol can lead to sexual problems.  Home care  The following guidelines will help you deal with alcohol abuse:    Admit you have a problem with alcohol.    Ask for help from your healthcare provider and trusted family members or close friends.    Get help from people trained in dealing with alcohol abuse. This may be individual counseling or group therapy, or it may be a supervised alcohol treatment program.    Join a self-help group for alcohol abuse such as Alcoholics Anonymous (AA).    Stay away from people who abuse alcohol or tempt you to drink.  Follow-up care  Follow up with your healthcare provider, or as advised. Contact these groups to get help:    Alcoholics Anonymous (AA): Go to www.aa.org or check the phone book for meetings near you.    National Alcohol and Substance Abuse Information Center (NASAIC): 173.338.1974, www.addictioncareAgency Systems.[employer]    National [place] on Alcoholism and Drug Dependence (NCADD): 234-AVY-IMWK (831-446-9712), www.ncadd.org  Call 911  Call 911 if any of these occur:    Trouble breathing or slow irregular breathing    Chest pain    Sudden weakness on one side of your body or sudden trouble speaking    Heavy bleeding or vomiting blood    Very drowsy or trouble awakening    Fainting or loss of consciousness    Rapid heart rate    Seizure  When to seek medical care  Call your healthcare provider right away if any of these occur:     Confusion    Hallucinations (seeing, hearing, or feeling things that aren t there)    Pain in your upper abdomen that gets worse    Repeated vomiting or black or tarry stools    Severe shakiness  Date Last  Reviewed: 6/1/2016 2000-2018 [employer]. [address] [place] [address], [place] [address]. All rights reserved. This information is not intended as a substitute for professional medical care. Always follow your healthcare professional's instructions.
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
## 2019-06-10 NOTE — TELEPHONE ENCOUNTER
"Requested Prescriptions   Pending Prescriptions Disp Refills     tamsulosin (FLOMAX) 0.4 MG capsule  Last Written Prescription Date:  05/15/19  Last Fill Quantity: 14,  # refills: 0   Last Office Visit with G, P or [facility] prescribing provider:  05/06/19-Thein   Future Office Visit:    Next 5 appointments (look out 90 days)    [place] 10, 2019  1:20 PM CDT  [facility] [address] [place] [id number]  [phone]        14 capsule 0     Sig: Take 1 capsule (0.4 mg) by mouth daily       Alpha Blockers Passed - 6/10/2019  8:14 AM        Passed - Blood pressure under 140/90 in past 12 months     BP Readings from Last 3 Encounters:   05/13/19 101/63   05/06/19 93/61   04/04/19 (!) 73/34                 Passed - Recent (12 mo) or future (30 days) visit within the authorizing provider's specialty     Patient had office visit in the last 12 months or has a visit in the next 30 days with authorizing provider or within the authorizing provider's specialty.  See \"Patient Info\" tab in inbasket, or \"Choose Columns\" in Meds & Orders section of the refill encounter.              Passed - Patient does not have Tadalafil, Vardenafil, or Sildenafil on their medication list        Passed - Medication is active on med list        Passed - Patient is 18 years of age or older          "

## 2019-06-10 NOTE — PROGRESS NOTES
Clinic Care Coordination Contact  Care Team Conversations    Patient was inpatient at [facility] 6/6/19-6/7/19 for ascites and was discharged home with [facility], [phone].  RN CC spoke with [facility] and was informed patient refused all home care services this past weekend.    RN CC reviewed chart and noted patient will be seen today by primary care for hospital follow up.    Plan: RN CC will be available during patient's appointment today and will otherwise follow up with patient within 1-2 days.    Melissa Behl BSN, RN, PHN  Primary Care Clinical RN Care Coordinator  [facility]   [phone]

## 2019-06-11 ASSESSMENT — ACTIVITIES OF DAILY LIVING (ADL): DEPENDENT_IADLS:: TRANSPORTATION;CLEANING;LAUNDRY

## 2019-06-11 NOTE — PROGRESS NOTES
Clinic Care Coordination Contact    Clinic Care Coordination Contact  OUTREACH    Referral Information:  Referral Source: PCP    Primary Diagnosis: GI Disorders    Chief Complaint   Patient presents with     Clinic Care Coordination - Post Hospital     RN ED f/u        [place] Utilization: Patient established with hepatology, but will be changing providers to [facility] 6/14/19.  Patient utilizes [facility], most recent hospitalization 6/6/19-6/7/19.  Clinic Utilization  Difficulty keeping appointments:: No  Compliance Concerns: Yes  No-Show Concerns: No  No PCP office visit in Past Year: No  Utilization    Last refreshed: 6/10/2019  4:01 PM:  Hospital Admissions 0           Last refreshed: 6/10/2019  4:01 PM:  ED Visits 0           Last refreshed: 6/10/2019  4:01 PM:  No Show Count (past year) 3              Current as of: 6/10/2019  4:01 PM              Clinical Concerns:  Current Medical Concerns:  RN CC spoke with patient who was inpatient at [facility] 6/6/19-6/7/19 for ascites.  Patient had paracentesis with 6 L removed in the ED.  Patient states he wants additional fluid removed, however, the hospital informed him they could only remove so much.  Patient had been reported as not having enough bowel movements (recommendations is to take Lactulose to have 3 bowel movements/day).  Patient states he is now have 3 or more bowel movements per day with Lactulose.  Patient will be establishing with [facility] on 6/14/19, as he wanted a provider closer to home and wanted to be seen ASAP.  Patient is hoping to have scheduled paracentesis set up so he does not have to utilize the ED so often.  Patient continues to struggle with chronic pain.  RN CC reviewed low sodium diet with patient, what 2,000 mg and 2 grams meant.  Patient states he went to the grocery store yesterday and purchased all items recommended by the hospital dietician.    Patient did not show to his wheelchair and  "seated mobility evaluation on 6/4/19.  Patient states with everything going on he had forgotten.  Patient continues to cite is current power chair as a barrier in the quality of his life.  RN CC provided patient with the scheduling number and he made a goal to reschedule this appointment.    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)     Ulcer of esophagus without bleeding     Duodenal ulcer without hemorrhage or perforation and without obstruction      Current Behavioral Concerns: Patient continues to struggle with alcohol cessation and did not wish to discuss at time of call.      Education Provided to patient: RN CC reviewed office visit instructions from 6/10/19 office visit, low sodium diet, wheelchair seated mobility evaluation, importance of following lactulose instructions to produce 3 bowel movements/day.     Pain  Pain (GOAL):: Yes  Type: Chronic (>3mo)  Location of chronic pain:: \"everywhere\"  Radiating: No  Progression: Worsening  Chronic pain severity:: 8(\"I don't even know\")  Limitation of routine activities due to chronic pain:: Yes  Description: Able to do moderate activities  Alleviating Factors: Rest  Aggravating Factors: Activity(riding on current power scooter)  Health Maintenance Reviewed: Due/Overdue   Health Maintenance Due   Topic Date Due     PREVENTIVE CARE VISIT  1965     HF ACTION PLAN  1965     DEPRESSION ACTION PLAN  1965     COLONOSCOPY  09/05/1974     ZOSTER IMMUNIZATION (1 of 2) 09/05/2014     LIPID  06/07/2019    "   Clinical Pathway: None    Medication Management:  Patient reports compliance with medications, however, has struggled with comprehension of his medications in the past.  Patient has declined home care services.  Patient currently taking medications as prescribed.  Will continue to assess.       Functional Status:  Dependent ADLs:: Wheelchair-independent  Dependent IADLs:: Transportation, Cleaning, Laundry  Bed or wheelchair confined:: Yes  Mobility Status: Independent w/Device    Living Situation:  Current living arrangement:: I live alone  Type of residence:: [facility]    Diet/Exercise/Sleep:  Diet:: No added salt, Low saturated fat  Inadequate nutrition (GOAL):: No  Food Insecurity: No  Tube Feeding: No  Exercise:: Unable to exercise  Inadequate activity/exercise (GOAL):: No  Significant changes in sleep pattern (GOAL): No    Transportation:     Transportation means:: Medical transport, Public transportation, Family, Friend     Psychosocial:  Quaker or spiritual beliefs that impact treatment:: No  Mental health DX:: No  Mental health management concern (GOAL):: No  Informal Support system:: Parent, Family, Friends, Children     Financial/Insurance:   Financial/Insurance concerns (GOAL):: No       Resources and Interventions:  Current Resources:    ;   Community Resources: PCA(Pt's daughter is his PCA)  Supplies used at home:: None  Equipment Currently Used at Home: wheelchair, power    Advance Care Plan/Directive  Advanced Care Plans/Directives on file:: No  Advanced Care Plan/Directive Status: Not Applicable    Referrals Placed: None     Goals:   Goals        General    #1 Reducing Risks (pt-stated)     Notes - Note created  5/6/2019  2:22 PM by Behl, Melissa K, RN    Goal Statement: I will stop drinking alcohol.  Measure of Success: Patient will report abstaining from alcohol.  Supportive Steps to Achieve: No longer desires to drink, wants to be around for his grandchildren.  Barriers: unknown.  Strengths:  care coordination.  Date to Achieve By: 7/1/19  Patient expressed understanding of goal: yes         #2 Healthy Eating (pt-stated)     Notes - Note created  5/6/2019  2:24 PM by Behl, Melissa K, RN    Goal Statement: I will follow a low sodium diet.  Measure of Success: Patient will report consistently following a low sodium diet.  Supportive Steps to Achieve: Patient has information from hospital on a low sodium diet.  Barriers: used to eating high sodium foods  Strengths: care coordination  Date to Achieve By: 7/1/19  Patient expressed understanding of goal: yes         #3 Functional (pt-stated)     Notes - Note created  5/15/2019 12:30 PM by Behl, Melissa K, RN    Goal Statement: I want a new power scooter.  Measure of Success: Patient will obtain a new power scooter.  Supportive Steps to Achieve: RN CC will inquire about a new order.  Barriers: unknown  Strengths: care coordination  Date to Achieve By: 8/1/19  Patient expressed understanding of goal: yes                 Patient/Caregiver understanding: Patient has poor to fair understanding of his current plan of care and has demonstrated difficulty with adherence.    Outreach Frequency: weekly  Future Appointments              In 2 weeks [id number]    In 5 months Wenceslao Urena MD [facility], [facility]          Plan:   1. Patient will take all medications as prescribed.  2. Patient will reschedule his wheelchair and seated mobility evaluation.  3. Patient will follow a low sodium diet.  4. Patient will continue to work on complete cessation of alcohol.  5. Patient will establish with [facility] 6/14/19 as scheduled.  6. RN CC will follow up with patient in 1-2 weeks.    Melissa Behl BSN, RN, PHN  Primary Care Clinical RN Care Coordinator  [facility]   [phone]

## 2019-06-13 ENCOUNTER — PATIENT OUTREACH (OUTPATIENT)
Dept: CARE COORDINATION | Facility: CLINIC | Age: 54
End: 2019-06-13

## 2019-06-13 ENCOUNTER — TRANSFERRED RECORDS (OUTPATIENT)
Dept: HEALTH INFORMATION MANAGEMENT | Facility: CLINIC | Age: 54
End: 2019-06-13

## 2019-06-13 DIAGNOSIS — R53.81 PHYSICAL DECONDITIONING: ICD-10-CM

## 2019-06-13 DIAGNOSIS — M54.2 CERVICALGIA: Primary | ICD-10-CM

## 2019-06-13 ASSESSMENT — ACTIVITIES OF DAILY LIVING (ADL): DEPENDENT_IADLS:: TRANSPORTATION;CLEANING;LAUNDRY

## 2019-06-13 NOTE — PROGRESS NOTES
Clinic Care Coordination Contact  Care Team Conversations    Patient contacted RN CC to request a physical therapy referral for his generalized pain (neck pain is most severe) and deconditioning/weakness.  Of note, patient declined home care services upon his last hospital discharge.    Patient reports he was seen today by [facility] and is scheduled for a paracentesis tomorrow.    To Trino Coy PA-C to advise if patient may have a referral for PT for reported generalized pain, neck pain and deconditioning/weakness.    Melissa Behl BSN, RN, PHN  Primary Care Clinical RN Care Coordinator  [facility]   [phone]

## 2019-06-13 NOTE — PROGRESS NOTES
Patient contacted and informed. He states will wait to know more when ready to schedule will call the phone provided for scheduling with physical therapy at [facility].    Gerald King CMA

## 2019-06-18 ENCOUNTER — PATIENT OUTREACH (OUTPATIENT)
Dept: CARE COORDINATION | Facility: CLINIC | Age: 54
End: 2019-06-18

## 2019-06-18 ASSESSMENT — ACTIVITIES OF DAILY LIVING (ADL): DEPENDENT_IADLS:: TRANSPORTATION;CLEANING;LAUNDRY

## 2019-06-18 NOTE — PROGRESS NOTES
Clinic Care Coordination Contact  [facility]/Voicemail    Referral Source: PCP  Clinical Data: Care Coordinator Outreach  Outreach attempted x 1.  Left message on voicemail with call back information and requested return call.  Plan: Care Coordinator mailed out care coordination introduction letter on 5/3/19. Care Coordinator will try to reach patient again in 3-10 business days.    Melissa Behl BSN, RN, PHN  Primary Care Clinical RN Care Coordinator  [facility]   [phone]

## 2019-06-26 ENCOUNTER — TELEPHONE (OUTPATIENT)
Dept: FAMILY MEDICINE | Facility: CLINIC | Age: 54
End: 2019-06-26

## 2019-06-26 NOTE — TELEPHONE ENCOUNTER
GI referral dated 6/10/19 and demographic face sheet is printed and faxed.  Papito Stahl,  [facility]    Please call patient and let patient know referral is faxed.  Papito Stahl,  [facility]

## 2019-06-27 ENCOUNTER — TELEPHONE (OUTPATIENT)
Dept: FAMILY MEDICINE | Facility: CLINIC | Age: 54
End: 2019-06-27

## 2019-06-27 ENCOUNTER — PATIENT OUTREACH (OUTPATIENT)
Dept: CARE COORDINATION | Facility: CLINIC | Age: 54
End: 2019-06-27

## 2019-06-27 ASSESSMENT — ACTIVITIES OF DAILY LIVING (ADL): DEPENDENT_IADLS:: TRANSPORTATION;CLEANING;LAUNDRY

## 2019-06-27 NOTE — TELEPHONE ENCOUNTER
Reason for Call: Request for a referral:    Order or referral being requested: Wants referral to [facility] for paracentesis.    Date needed: as soon as possible    Has the patient been seen by the PCP for this problem? YES    Additional comments: Please call when approved.    Phone number Patient can be reached at:  Cell number on file:    Telephone Information:   Mobile [phone]     Best Time:  any    Can we leave a detailed message on this number?  YES    Call taken on 6/27/2019 at 11:50 AM by Britney Patel

## 2019-06-27 NOTE — TELEPHONE ENCOUNTER
Reason for Call:  Other     Detailed comments: [facility] is calling stating patient has been calling them to get orders for paracentesis from his clinic. Not sure if this is about the same thing. Asking to get a call back. They have not received anything from [place] for any kind of orders    Phone Number Patient can be reached at: Other phone number:   [facility] [phone]          Best Time: any    Can we leave a detailed message on this number? YES    Call taken on 6/27/2019 at 8:19 AM by Saloni Posey

## 2019-06-27 NOTE — PROGRESS NOTES
Clinic Care Coordination Contact  [facility]/Voicemail       Clinical Data: Care Coordinator Outreach  Outreach attempted x 2.  Left message on voicemail with call back information and requested return call.  Plan: Care Coordinator mailed out care coordination introduction letter on 5/3/19. Care Coordinator will try to reach patient again in 1-2 weeks.    Melissa Behl BSN, RN, PHN  Primary Care Clinical RN Care Coordinator  [facility]   [phone]

## 2019-06-27 NOTE — TELEPHONE ENCOUNTER
Called [facility] and they confirmed have received orders from Dr López at [facility] for procedure paracentesis. Patient is currently scheduled. No further action needed.    JASON King CMA

## 2019-06-27 NOTE — TELEPHONE ENCOUNTER
New call from patient. Called patient back and went over information from nurse message from telephone encounter 6/26/19. He hasn't checked his home records yet but will try to find out GI information then call us back today for an update.     Gerald King CMA

## 2019-06-27 NOTE — TELEPHONE ENCOUNTER
RN CC has attempted to contact patient on 6/18/19 and today with no answer and no call back.  Will await call back from patient and continue to attempt to engage and assist patient as able.    FYI to care team and provider.    Melissa Behl BSN, RN, PHN  Primary Care Clinical RN Care Coordinator  [facility]   [phone]

## 2019-06-27 NOTE — TELEPHONE ENCOUNTER
I discussed similar to RN notes below with patient at his visit. Needs to establish care with GI/hepatology to set up outpt kat oscar as needed. [place] and [place] someone closer which is why I placed new referral at his visit.  Agree with plan- possibly case management could get involved (again) to help patient if necessatry.      Trino Coy PA-C

## 2019-06-27 NOTE — TELEPHONE ENCOUNTER
Patient contacted. He said he wants a paracentesis. Explained we do are not the ones that order those procedures. Paracentesis procedure is managed by GI specialist. Patient does not recall which GI place he goes to. Both phone numbers for [facility] and [facility] provided to patient. He will call  for a plan. Patient did not want to come in for appointment today. If he cannot get ahold of GI, then he will come in for an appointment tomorrow, patient reports.     Holding off calling [facility] until we clarify what is indicated for patient's care.     Routing to provider to review and advise.   Kirti Alvarado RN

## 2019-07-05 ENCOUNTER — PATIENT OUTREACH (OUTPATIENT)
Dept: CARE COORDINATION | Facility: CLINIC | Age: 54
End: 2019-07-05

## 2019-07-05 ASSESSMENT — ACTIVITIES OF DAILY LIVING (ADL): DEPENDENT_IADLS:: TRANSPORTATION;CLEANING;LAUNDRY

## 2019-07-05 NOTE — PROGRESS NOTES
Clinic Care Coordination Contact  [facility]/Voicemail    Referral Source: PCP  Clinical Data: Care Coordinator Outreach  Outreach attempted x 3.  Left message on voicemail with call back information and requested return call.  Plan: Care Coordinator mailed out care coordination introduction letter on 5/3/19. Care Coordinator will do no further outreaches at this time.  FYI to PCP.    Melissa Behl BSN, RN, PHN  Primary Care Clinical RN Care Coordinator  [facility]   [phone]

## 2019-07-10 ENCOUNTER — PATIENT OUTREACH (OUTPATIENT)
Dept: CARE COORDINATION | Facility: CLINIC | Age: 54
End: 2019-07-10

## 2019-07-10 ASSESSMENT — ACTIVITIES OF DAILY LIVING (ADL): DEPENDENT_IADLS:: TRANSPORTATION;CLEANING;LAUNDRY

## 2019-07-10 NOTE — PROGRESS NOTES
"Clinic Care Coordination Contact    Clinic Care Coordination Contact  OUTREACH    Referral Information:  Referral Source: PCP    Primary Diagnosis: GI Disorders    Chief Complaint   Patient presents with     Clinic Care Coordination - Initial     RN        Universal Utilization: Patient is followed by hepatology with [facility].  Patient utilizes [facility], most recent hospitalization 6/6/19-6/7/19.  Clinic Utilization  Difficulty keeping appointments:: No  Compliance Concerns: Yes  No-Show Concerns: No  No PCP office visit in Past Year: No  Utilization    Last refreshed: 7/5/2019  7:06 PM:  Hospital Admissions 0           Last refreshed: 7/5/2019  7:06 PM:  ED Visits 0           Last refreshed: 7/5/2019  7:06 PM:  No Show Count (past year) 3              Current as of: 7/5/2019  7:06 PM              Clinical Concerns:  Current Medical Concerns:  Patient contacted RN CC to assist with concerns regarding his alcoholic cirrhosis of liver with ascites.  Patient's most recent paracentesis was outpatient 7/8/19 at [facility] ordered by his [facility] provider Dr. Rudolph.  Patient reports increase in abdominal bloating since his paracentesis on 7/8/19.  Patient states he has not be on his Xifaxan for 2-3 weeks.  Patient states he had a \"couple of beers\" last weekend because he couldn't sleep \"and that's what I do.\"  Patient reluctant to completely abstain from alcohol.  Patient requesting assistance in coming up with a plan for scheduled paracentesis and to obtain his xifaxan.  RN CC contacted Dr. Rudolph's RN Care Coordinator Holli ([phone]) with patient on the phone to update Dr. Rudolph on patient symptoms and to request xifaxan prescription.  RN CC provided writer and patient contact information.    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  Patient is not ready to completely abstain from alcohol due to poor coping mechanisms.      Education Provided to patient: RN CC reviewed care coordination services, importance of alcohol cessation, importance of medication compliance and reviewed purpose of each medication.       Pain  Pain (GOAL):: Yes  Type: Chronic (>3mo)  Location of chronic pain:: abdomen from paracentesis site  Radiating: No  Progression: Worsening  Description of pain: Tender  Chronic pain severity:: (\"sore\")  Limitation of routine activities due to chronic pain:: Yes  Description: Able to do moderate activities  Alleviating Factors: Rest  Aggravating Factors: Activity(riding on current power scooter)  Health Maintenance Reviewed:    Clinical Pathway: None    Medication Management:  Patient does not have xifaxan and states he has been out for 2-3 weeks.  Patient takes medications out of bottles at prescribed times.    Functional Status:  Dependent ADLs:: Wheelchair-independent  Dependent IADLs:: Transportation, Cleaning, Laundry  Bed or wheelchair confined:: Yes  Mobility Status: Independent w/Device  Fallen 2 or more times in the past year?: No  Any fall with injury in the past year?: No    Living Situation:  Current living arrangement:: I live alone  Type of residence:: Town home    Diet/Exercise/Sleep:  Diet:: No added salt, Low saturated fat  Inadequate nutrition (GOAL):: No  Food Insecurity: No  Tube Feeding: No  Exercise:: Unable to " exercise  Inadequate activity/exercise (GOAL):: No  Significant changes in sleep pattern (GOAL): No    Transportation:     Transportation means:: Medical transport, Public transportation, Family, Friend     Psychosocial:  Congregation or spiritual beliefs that impact treatment:: No  Mental health DX:: No  Mental health management concern (GOAL):: No  Informal Support system:: Parent, Family, Friends, Children     Financial/Insurance:   Financial/Insurance concerns (GOAL):: No       Resources and Interventions:  Current Resources:    ;   Community Resources: PCA(Pt's daughter is his PCA)  Supplies used at home:: None  Equipment Currently Used at Home: wheelchair, power    Advance Care Plan/Directive  Advanced Care Plans/Directives on file:: No  Advanced Care Plan/Directive Status: Considering Options    Referrals Placed: None     Goals:   Goals        General    #1 Medication  (pt-stated)     Notes - Note edited  7/10/2019 12:02 PM by Behl, Melissa K, RN    Goal Statement: I will take all my medications as prescribed.  Measure of Success: Patient will take all medications as prescribed.  Supportive Steps to Achieve: Connecting with [facility] to refill/prescribe medications  Barriers: does not have all medications  Strengths: care coordiation  Date to Achieve By: 7/31/19  Patient expressed understanding of goal: yes         #2 Improve chronic symptoms (pt-stated)     Notes - Note created  7/10/2019 12:00 PM by Behl, Melissa K, RN    Goal Statement: I want my stomach bloating to go down.  Measure of Success: Patient's will rate his abdomen bloating as tolerable.  Supportive Steps to Achieve: Patient working with [facility], paracentesis, medications, low sodium diet  Barriers: addressed  Strengths: care coordination  Date to Achieve By: 9/1/19  Patient expressed understanding of goal: yes                 Patient/Caregiver understanding: Patient has poor to fair understanding of his current plan of care and has demonstrated  difficulty with adherence.    Outreach Frequency: weekly  Future Appointments              In 3 weeks Desi Bazzi, OT [facility], [place], Occupational Therapy - Outpatient, [place]    In 4 months Wenceslao Urena MD [facility] Hepatology, [facility]          Plan:   1. Patient will take all medications as prescribed.  2. Patient will continue to work on cessation of alcohol.  3. RN CC will await call back from Holli BARRERA with [facility] regarding patient request for scheduled paracentesis and xifaxan prescription.  4. RN CC will follow up with patient in 1 week.    Melissa Behl BSN, RN, PHN  Primary Care Clinical RN Care Coordinator  [facility]   [phone]

## 2019-07-10 NOTE — LETTER
[facility]  Complex Care Plan  About Me:    Patient Name:  Marlon Mackey    YOB: 1965  Age:         53 year old   [place] MRN:    [id number] Telephone Information:  Home Phone [phone]   Mobile [phone]       Address:  [address] [place] [address] Email address:  No e-mail address on record      Emergency Contact(s)    Name Relationship Lgl Grd Work Phone Home Phone Mobile Phone   ESVIN MONTANEZ Mother   [phone]    2. NO SECONDARY C*    None            Primary language:  English     needed? No   [place] [facility]:  [phone] op. 1  Other communication barriers: Physical impairment  Preferred Method of Communication:  Mail  Current living arrangement: I live alone  Mobility Status/ Medical Equipment: Independent w/Device    Health Maintenance  Health Maintenance Reviewed:      My Access Plan  Medical Emergency 911   Primary Clinic Line [facility] - [phone]   24 Hour Appointment Line [phone] or  [phone]) (toll-free)   24 Hour Nurse Line [phone] (toll-free)   Preferred Urgent Care [facility], [phone]   Preferred [facility]  [phone]   Preferred Pharmacy [facility] [place], [place] - [address]     Behavioral Health Crisis Line The National Suicide Prevention Lifeline at 1-261.452.9840 or 911             My Care Team Members  Patient Care Team       Relationship Specialty Notifications Start End    Frank Childs MD PCP - General Internal Medicine  10/14/15     Phone: [phone] Fax: [phone]         [address] [place] [address]    Frank Childs MD Assigned PCP   4/1/18     Phone: [phone] Fax: [phone]         [address] [place] [address]    Behl, Melissa K, RN Lead Care Coordinator Primary Care - CC Admissions 7/10/19      Phone: [phone] Fax: [phone]                My Care Plans  Self Management and Treatment Plan  Goals and (Comments)  Goals        General    #1 Medication  (pt-stated)     Notes - Note edited  7/10/2019 12:02 PM by Behl, Melissa K, RN    Goal Statement: I will take all my medications as prescribed.  Measure of Success: Patient will take all medications as prescribed.  Supportive Steps to Achieve: Connecting with [facility] to refill/prescribe medications  Barriers: does not have all medications  Strengths: care coordiation  Date to Achieve By: 7/31/19  Patient expressed understanding of goal: yes         #2 Improve chronic symptoms (pt-stated)     Notes - Note created  7/10/2019 12:00 PM by Behl, Melissa K, RN    Goal Statement: I want my stomach bloating to go down.  Measure of Success: Patient's will rate his abdomen bloating as tolerable.  Supportive Steps to Achieve: Patient working with [facility], paracentesis, medications, low sodium diet  Barriers: addressed  Strengths: care coordination  Date to Achieve By: 9/1/19  Patient expressed understanding of goal: yes                Action Plans on File:                       Advance Care Plans/Directives Type:        My Medical and Care Information  Problem List   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    Care Coordination Start  Date: 7/10/2019   Frequency of Care Coordination: weekly   Form Last Updated: 07/10/2019

## 2019-07-11 NOTE — PROGRESS NOTES
Clinic Care Coordination Contact  Care Team Conversations    RN CC spoke with Holli BARRERA from [facility].  Holli informed writer she spoke with patient this morning.  Patient's Xifaxan prescription was sent to patient's pharmacy with a confirmed fax receipt.  Patient will have a paracentesis next week with a max drain of 10 L, as this is the maximum that can be drained due to safety reasons.  Per Holli, standing orders for paracentesis are not written at [facility], as they like to keep track of how often their patients are needing these procedures.  Patient is to contact [facility] for any symptoms warranting a paracentesis prior to crisis point, symptoms patient would need to call in to request a paracentesis would include abdominal itching and bloating, etc.      Plan: RN CC will follow up with patient next week as planned to reinforce this information and to follow up on goals.    Melissa Behl BSN, RN, PHN  Primary Care Clinical RN Care Coordinator  [facility]   [phone]

## 2019-07-17 ENCOUNTER — PATIENT OUTREACH (OUTPATIENT)
Dept: CARE COORDINATION | Facility: CLINIC | Age: 54
End: 2019-07-17

## 2019-07-17 NOTE — PROGRESS NOTES
Clinic Care Coordination Contact  [facility]/Voicemail       Clinical Data: Care Coordinator Outreach  Outreach attempted x 1.  Left message on voicemail with call back information and requested return call.  Plan: RN CC will outreach to patient in approximately 2 weeks.    Melissa Behl BSN, RN, PHN  Primary Care Clinical RN Care Coordinator  [facility]   [phone]

## 2019-07-23 NOTE — PROGRESS NOTES
"Clinic Care Coordination Contact  Follow Up Progress Note      Assessment: Patient contacted RN CC to inquire how to set up another paracentesis.  Noted patient had one completed on 7/19/19.  Patient states his stomach is starting to feel more tight and he has a \"stomache.\"  When asked about abdominal bloating patient was unable to answer writer, \"I don't know, I just want it done before it gets really bad.  Why can't I just have one every week?\"  Patient did not wish to discuss medications at this visit and was only focused on getting another paracentesis to alleviate his abdominal bloating.     Goals:   Goals        General    #1 Medication  (pt-stated)     Notes - Note edited  7/10/2019 12:02 PM by Behl, Melissa K, RN    Goal Statement: I will take all my medications as prescribed.  Measure of Success: Patient will take all medications as prescribed.  Supportive Steps to Achieve: Connecting with [facility] to refill/prescribe medications  Barriers: does not have all medications  Strengths: care coordiation  Date to Achieve By: 7/31/19  Patient expressed understanding of goal: yes         #2 Improve chronic symptoms (pt-stated)     Notes - Note created  7/10/2019 12:00 PM by Behl, Melissa K, RN    Goal Statement: I want my stomach bloating to go down.  Measure of Success: Patient's will rate his abdomen bloating as tolerable.  Supportive Steps to Achieve: Patient working with [facility], paracentesis, medications, low sodium diet  Barriers: addressed  Strengths: care coordination  Date to Achieve By: 9/1/19  Patient expressed understanding of goal: yes                 Goal Progression #1: As of today's date 7/23/2019 goal is met at: unable to assess due to patient refusal, patient only wished to talk about paracentesis today.   Goal Status:  Active     Goal Progression #2: As of today's date 7/23/2019 goal is met at 0 - 25%.   Goal Status:  Active      Intervention/Education provided during outreach: RN CC contacted [facility] " ([phone]) with patient to inquire about scheduling another paracentesis.     Outreach Frequency: Every 2 weeks      Plan:   #1 Patient will await call back from [facility] regarding scheduling another paracentesis.   #2 Care Coordinator will follow up in 2 weeks.    Melissa Behl BSN, RN, PHN  Primary Care Clinical RN Care Coordinator  [facility]   [phone]

## 2019-07-24 ENCOUNTER — TELEPHONE (OUTPATIENT)
Dept: FAMILY MEDICINE | Facility: CLINIC | Age: 54
End: 2019-07-24

## 2019-07-24 NOTE — TELEPHONE ENCOUNTER
Reason for Call:  Other Order     Detailed comments: Asking about getting an order for Paracentesis sent to [facility] [place]. Please call as soon as order has been sent.  Thank you     Phone Number Patient can be reached at: [phone]    Best Time: Any     Can we leave a detailed message on this number? YES    Call taken on 7/24/2019 at 11:09 AM by Farzad Christy

## 2019-07-24 NOTE — TELEPHONE ENCOUNTER
Saw Lesly GONZALEZ's note below, she was not aware that this Nurse was working with patient already. Updated her on symptoms of patient and that Dr. Childs advised ED. Called and spoke with patient and told him that he needs to report to ED. Patient was reluctant to agree with plan of ED. Explained to him the importance of going there. He stated that his daughter was on his way over to take him there now and he will go to [facility].  Siena Varner RN

## 2019-07-24 NOTE — PROGRESS NOTES
Clinic Care Coordination Contact  Care Team Conversations    RN CC received call from patient stating he had not heard back from [facility] ([phone]) regarding his paracentesis request.  RN CC called [facility] back and was informed that the message had been sent back by MAGDA Valenzuela stating she would not sign the order due to not seeing patient.  The  stated she would send this to patient's regular GI, Dr. Rudolph.  RN CC was informed by [facility] that patient will be establishing with hepatology at the end of this month.  [facility] will contact patient and writer with plan once Dr. Rudolph has had a chance to review the message.    Plan: RN CC will await call back from [facility].    Melissa Behl BSN, RN, PHN  Primary Care Clinical RN Care Coordinator  [facility]   [phone]

## 2019-07-24 NOTE — TELEPHONE ENCOUNTER
Spoke with patient on the phone. He reports that starting 2 days ago he has had progressive abdominal swelling. He reports the following symptoms: poor intake, pain in abdomen 9/10 greater than 2 hours long, difficulty getting all the urine to empty from his body.    Patient has hx of ascites, CHF and had paracentesis done on 7/19/19 due to ascites secondary to alcoholic cirrhosis of liver.     He is negative for fever, vomiting, nausea.     He reports that it is difficult to move due to abdominal pain.    Patient wants provider to send order to [facility] [place] parancentisis to be done. Told him He needs to report to ED now for his symptoms but he refused he wants this writer to speak to Dr. Childs.        Huddled with Dr. Childs he stated that he cannot send an order to [facility] for patient and that patient needs assessment by provider. Dr. Childs stated that patient could go to Urgent Care or ED at [facility] but not here at [facility].    This writer attempted to contact Marlon  on 07/24/19    Writer left detailed message telling patient he needs to report to ED now. Will try to call him again.     Reason for call tell pt to go to ED and left detailed message.      If patient calls back:   RELL Varner, RN

## 2019-07-24 NOTE — TELEPHONE ENCOUNTER
JASON, RN CC is working with patient on obtaining paracentesis orders from Dr. Rudolph with [facility].    Melissa Behl BSN, RN, PHN  Primary Care Clinical RN Care Coordinator  [facility]   [phone]

## 2019-07-29 ENCOUNTER — PATIENT OUTREACH (OUTPATIENT)
Dept: CARE COORDINATION | Facility: CLINIC | Age: 54
End: 2019-07-29

## 2019-07-29 NOTE — PROGRESS NOTES
Clinic Care Coordination Contact  Care Team Conversations    RN CC received a call back from Shahla with [facility] ([phone]) informing writer the orders for patient's paracentesis have been faxed over to [facility].  Shahla informed writer she will work on obtaining additional orders so patient does not have to call each time.  Shahla informed writer they can order up to 3 paracentesis procedures at once time.    RN CC informed patient of the above information who verbalized understanding and stated he would call [facility] to schedule his procedure.    Melissa Behl BSN, RN, PHN  Primary Care Clinical RN Care Coordinator  [facility]   [phone]

## 2019-07-29 NOTE — PROGRESS NOTES
"Clinic Care Coordination Contact  Care Team Conversations    Patient contacted writer stating he had not heard back from [facility] (last time writer spoke with [facility] was 7/24/19) to schedule a paracentesis.  Patient is wanting prn paracentesis, \"that's what [facility] told me to ask for.\"  Patient was instructed to go to the ED by triage 7/24/19.  Patient states he called [facility] ED following the triage call and [facility] ED told him an ED visit was not needed and patient needed to contact his GI provider for a paracentesis order \"for prn.\"     RN ANAHI spoke with Shahla, Patient Coordinator from [facility] ([phone]) who states an order for a paracentesis is awaiting signature from MAGDA Valenzuela and will be sent to [facility] today.  Shahla states she will contact patient once completed, as there will also be labs that are needed.    RN CC also clarified hepatology appointment for patient, 8/26/19 at [facility] office.    RN CC updated patient on the above information.  Patient will await call from [facility].    RN CC will follow up with patient as previously planned.    Melissa Behl BSN, RN, PHN  Primary Care Clinical RN Care Coordinator  [facility]   [phone]        "

## 2019-08-08 ENCOUNTER — PATIENT OUTREACH (OUTPATIENT)
Dept: CARE COORDINATION | Facility: CLINIC | Age: 54
End: 2019-08-08

## 2019-08-08 NOTE — PROGRESS NOTES
Clinic Care Coordination Contact    Follow Up Progress Note      Assessment: RN CC spoke with patient who reports improvement in abdominal bloating following his last paracentesis, but states it is gradually swelling up again. Patient reports abdominal pain that is waxing and waning.  Patient continues to work on medication compliance.    Goals addressed this encounter:   Goals Addressed                 This Visit's Progress      #1 Medication  (pt-stated)   Improving     Goal Statement: I will take all my medications as prescribed.  Measure of Success: Patient will take all medications as prescribed.  Supportive Steps to Achieve: Connecting with [facility] to refill/prescribe medications  Barriers: does not have all medications  Strengths: care coordiation  Date to Achieve By: 10/31/19  Patient expressed understanding of goal: yes           #2 Improve chronic symptoms (pt-stated)   10%     Goal Statement: I want my stomach bloating to go down.  Measure of Success: Patient's will rate his abdomen bloating as tolerable.  Supportive Steps to Achieve: Patient working with [facility], paracentesis, medications, low sodium diet  Barriers: addressed  Strengths: care coordination  Date to Achieve By: 9/1/19  Patient expressed understanding of goal: yes                Intervention/Education provided during outreach: RN CC assisted patient in contacting [facility] to schedule a paracentesis prior to his bloating becoming intolerable.  [facility] ultrasound department will call patient back to schedule the paracentesis.         Outreach Frequency: 2 weeks    Plan:   1. Patient will have paracentesis once scheduled by [facility].  2. Patient will continue to work on taking medications as prescribed.  3. Care Coordinator will follow up in 2-3  weeks.    Melissa Behl BSN, RN, PHN  Primary Care Clinical RN Care Coordinator  [facility]   [phone]

## 2019-08-19 ENCOUNTER — PATIENT OUTREACH (OUTPATIENT)
Dept: CARE COORDINATION | Facility: CLINIC | Age: 54
End: 2019-08-19

## 2019-08-19 NOTE — PROGRESS NOTES
Clinic Care Coordination Contact    Follow Up Progress Note      Assessment: Patient calls writer to state he needs another paracentesis, however, when he contacted [facility] to schedule they had no further orders.  Patient reports abdominal bloating and abdominal pain increasing to 7/10.  Patient states he is getting to the point where he knows he needs to be drained again.  RN ANAHI reviewed Care Everywhere and noted last paracentesis was done at [facility] 8/1/19.  Patient states he is feeling too full to eat, but has been able to drink fluids.  Patient will be establishing with hepatology 8/26/19 at [facility] office, patient was able to state this planned appointment to writer.  Patient reports taking medications as prescribed.    Goals addressed this encounter:   Goals Addressed                 This Visit's Progress      #1 Medication  (pt-stated)   Improving     Goal Statement: I will take all my medications as prescribed.  Measure of Success: Patient will take all medications as prescribed.  Supportive Steps to Achieve: Connecting with [facility] to refill/prescribe medications  Barriers: does not have all medications  Strengths: care coordiation  Date to Achieve By: 10/31/19  Patient expressed understanding of goal: yes           #2 Improve chronic symptoms (pt-stated)   0%     Goal Statement: I want my stomach bloating to go down.  Measure of Success: Patient's will rate his abdomen bloating as tolerable.  Supportive Steps to Achieve: Patient working with [facility], paracentesis, medications, low sodium diet  Barriers: addressed  Strengths: care coordination  Date to Achieve By: 9/1/19  Patient expressed understanding of goal: yes                Intervention/Education provided during outreach: RN CC contacted [facility] [phone] and provided the above assessment and patient request for paracentesis orders asap to [facility].       Outreach Frequency: 2 weeks    Plan:   1. Patient will contact writer if he  has not heard from [facility] by tomorrow.  2. Patient will seek emergency care for new or worsening symptoms.  3. Patient will establish with hepatology next week as scheduled for 8/26/19.  4. RN CC will follow up with patient in 2 weeks.    Melissa Behl BSN, RN, PHN  Primary Care Clinical RN Care Coordinator  [facility]   [phone]

## 2019-08-20 DIAGNOSIS — R19.7 DIARRHEA, UNSPECIFIED TYPE: ICD-10-CM

## 2019-08-20 NOTE — PROGRESS NOTES
Clinic Care Coordination Contact  Care Team Conversations    RN CC received a call from patient stating he had not received a call to schedule his paracentesis.    RN CC spoke with [facility]  who informed writer the orders were faxed to [facility].  RN CC informed  that patient receives his paracentesis from [facility].  Writer was then transferred to the  Shayla and writer left a message informing Shayla that patient's paracentesis orders will need to be faxed to [facility].  Writer requested a call once this has been completed.    Melissa Behl BSN, RN, PHN  Primary Care Clinical RN Care Coordinator  [facility]   [phone]

## 2019-08-20 NOTE — PROGRESS NOTES
Clinic Care Coordination Contact  Care Team Conversations    RN CC received a call back from [facility], orders will be re-faxed to [facility] for patient's requested paracentesis.    RN CC informed patient of the above and patient was appreciative or writer's assistance.  Patient will establish with hepatology at [facility] on 8/26/19.  RN CC will follow up with patient as previously planned.    Melissa Behl BSN, RN, PHN  Primary Care Clinical RN Care Coordinator  [facility]   [phone]

## 2019-08-20 NOTE — TELEPHONE ENCOUNTER
Requested Prescriptions   Pending Prescriptions Disp Refills     bismuth subsalicylate (PEPTO BISMOL) 262 MG/15ML suspension        Last Written Prescription Date:  06/10/19  Last Fill Quantity: 180ml,   # refills: 0  Last Office Visit: 06/10/19Earnest  Future Office visit:       Routing refill request to provider for review/approval because:  Drug not on the [facility], [facility] or [facility] refill protocol or controlled substance 180 mL 0     Sig: Take 15 mLs by mouth every 6 hours as needed for indigestion       There is no refill protocol information for this order

## 2019-08-22 NOTE — TELEPHONE ENCOUNTER
Routing refill request to provider for review/approval because:  Drug not on the [facility], [facility] or [facility] refill protocol or controlled substance 180 mL 0        Sig: Take 15 mLs by mouth every 6 hours as needed for indigestion         Edyta Reilly RN on 8/22/2019 at 11:40 AM

## 2019-09-06 ENCOUNTER — PATIENT OUTREACH (OUTPATIENT)
Dept: CARE COORDINATION | Facility: CLINIC | Age: 54
End: 2019-09-06

## 2019-09-06 NOTE — PROGRESS NOTES
Clinic Care Coordination Contact  [facility]/Voicemail       Clinical Data: Care Coordinator Outreach  Outreach attempted x 1.  Left message on patient's voicemail with call back information and requested return call.  Plan: Care Coordinator will try to reach patient again in approximately 10 business days.    Melissa Behl BSN, RN, PHN  Primary Care Clinical RN Care Coordinator  [facility]   [phone]

## 2019-09-10 ENCOUNTER — PATIENT OUTREACH (OUTPATIENT)
Dept: CARE COORDINATION | Facility: CLINIC | Age: 54
End: 2019-09-10

## 2019-09-10 NOTE — PROGRESS NOTES
"Clinic Care Coordination Contact    Follow Up Progress Note      Assessment: Patient was seen at [facility] ED 9/9/19 due to abdominal bloating and wanting a paracentesis.  Patient was informed his need was not emergent and was discharged with instructions to call back during business hours to schedule as an outpatient.  Patient called writer back to state he wanted to know how to have these scheduled on a regular basis.  RN CC inquired if patient attended his hepatology appointment on 8/26/19 at [facility] in [facility].  Patient states he did not due to forgetting about this appointment.  Patient states his bloating \"isnt't that bad yet\" and has scheduled his outpatient paracentesis for 9/12/19 at 11:00 am at [facility].    Goals addressed this encounter:   Goals Addressed                 This Visit's Progress      #2 Improve chronic symptoms (pt-stated)   On track     Goal Statement: I want my stomach bloating to go down.  Measure of Success: Patient's will rate his abdomen bloating as tolerable.  Supportive Steps to Achieve: Patient working with [facility], paracentesis, medications, low sodium diet  Barriers: addressed  Strengths: care coordination  Date to Achieve By: 11/1/19  Patient expressed understanding of goal: yes           #3 Medical (pt-stated)        Goal Statement: I will schedule an appointment with the liver specialist.  Measure of Success: Patient will schedule and attend the appointment with the liver specialist.  Supportive Steps to Achieve: RN CC assisting patient in scheduling.  Barriers: multiple appointments due to paracentesis  Strengths: care coordination  Date to Achieve By: 12/1/19  Patient expressed understanding of goal: yes                Intervention/Education provided during outreach: RN CC educated patient on the importance of seeing hepatology for management of his alcoholic cirrhosis of liver with ascites and ongoing need for paracentesis.  RN CC offered to perform a conference call " with [facility] to reschedule patient's hepatology appointment.  Patient declined at this time and stated he would call writer back to complete this.     Outreach Frequency: 2 weeks    Plan:   1. Patient will have paracentesis as scheduled 9/12/19.  2. Patient will schedule a hepatology appointment.  3. Care Coordinator will follow up in 1-2 weeks.    Melissa Behl BSN, RN, PHN  Primary Care Clinical RN Care Coordinator  [facility]   [phone]

## 2019-09-10 NOTE — PROGRESS NOTES
Clinic Care Coordination Contact    Patient left writer a voicemail at 2:08 pm stating he has a hepatology appointment 9/25/19.  RN CC will plan on following up with patient after this appointment.    Melissa Behl BSN, RN, PHN  Primary Care Clinical RN Care Coordinator  [facility]   [phone]

## 2019-09-10 NOTE — PROGRESS NOTES
Clinic Care Coordination Contact  [facility]/Voicemail       Clinical Data: Care Coordinator Outreach  Outreach attempted x 1.  Left message on patient's voicemail with call back information and requested return call.  Plan: Care Coordinator will try to reach patient again in 1-2 business days.    Melissa Behl BSN, RN, PHN  Primary Care Clinical RN Care Coordinator  [facility]   [phone]

## 2019-09-16 ENCOUNTER — PATIENT OUTREACH (OUTPATIENT)
Dept: CARE COORDINATION | Facility: CLINIC | Age: 54
End: 2019-09-16

## 2019-09-16 NOTE — PROGRESS NOTES
"Clinic Care Coordination Contact    Follow Up Progress Note      Assessment: Patient contacted writer to state he would like to see PCP to follow up due to duration of time since last visit.  Patient admitted to writer he has been on an \"eating frenzy\" lately and has been consuming large quantities of food and higher sodium foods.  Patient has been decreasing his alcohol intake.    Goals addressed this encounter:   Goals Addressed                 This Visit's Progress      COMPLETED: #1 Medication  (pt-stated)        Goal Statement: I will take all my medications as prescribed.  Measure of Success: Patient will take all medications as prescribed.  Supportive Steps to Achieve: Connecting with [facility] to refill/prescribe medications  Barriers: does not have all medications  Strengths: care coordiation  Date to Achieve By: 10/31/19  Patient expressed understanding of goal: yes           #2 Improve chronic symptoms (pt-stated)   On track     Goal Statement: I want my stomach bloating to go down.  Measure of Success: Patient's will rate his abdomen bloating as tolerable.  Supportive Steps to Achieve: Patient working with [facility], paracentesis, medications, low sodium diet  Barriers: addressed  Strengths: care coordination  Date to Achieve By: 11/1/19  Patient expressed understanding of goal: yes           #3 Medical (pt-stated)   On track     Goal Statement: I will schedule an appointment with the liver specialist.  Measure of Success: Patient will schedule and attend the appointment with the liver specialist.  Supportive Steps to Achieve: RN CC assisting patient in scheduling.  Barriers: multiple appointments due to paracentesis  Strengths: care coordination  Date to Achieve By: 12/1/19  Patient expressed understanding of goal: yes                Intervention/Education provided during outreach: RN CC reviewed importance of low sodium foods.     Outreach Frequency: monthly    Plan:   1. Patient will see PCP as scheduled " 9/20/19.  2. Patient will see hepatology at [facility] as scheduled 9/25/19.  3. Patient will continue to work with GI on receiving paracentesis.  4. Patient will continue to work on low sodium diet.  5. RN CC will follow up with patient following hepatology appointment.    Melissa Behl BSN, RN, PHN  Primary Care Clinical RN Care Coordinator  [facility]   [phone]

## 2019-09-17 ENCOUNTER — MEDICAL CORRESPONDENCE (OUTPATIENT)
Dept: HEALTH INFORMATION MANAGEMENT | Facility: CLINIC | Age: 54
End: 2019-09-17

## 2019-09-25 ENCOUNTER — PATIENT OUTREACH (OUTPATIENT)
Dept: CARE COORDINATION | Facility: CLINIC | Age: 54
End: 2019-09-25

## 2019-09-25 NOTE — PROGRESS NOTES
Clinic Care Coordination Contact    Follow Up Progress Note      Assessment: Patient contacted writer to state he missed his PCP appointment this morning due to his mom being discharged from the hospital and he needed to go take care of her.  Patient states he will call back to reschedule.     Goals addressed this encounter:   Goals Addressed                 This Visit's Progress      #2 Improve chronic symptoms (pt-stated)   20%     Goal Statement: I want my stomach bloating to go down.  Measure of Success: Patient's will rate his abdomen bloating as tolerable.  Supportive Steps to Achieve: Patient working with [facility], paracentesis, medications, low sodium diet  Barriers: addressed  Strengths: care coordination  Date to Achieve By: 11/1/19  Patient expressed understanding of goal: yes           #3 Medical (pt-stated)   On track     Goal Statement: I will schedule an appointment with the liver specialist.  Measure of Success: Patient will schedule and attend the appointment with the liver specialist.  Supportive Steps to Achieve: RN CC assisting patient in scheduling.  Barriers: multiple appointments due to paracentesis  Strengths: care coordination  Date to Achieve By: 12/1/19  Patient expressed understanding of goal: yes                Intervention/Education provided during outreach: RN CC reviewed importance of follow up with specialists.     Outreach Frequency: monthly    Plan:   1. Patient will reschedule PCP appointment.  2. Care Coordinator will follow up in 2-4  weeks.  Melissa Behl BSN, RN, PHN  Primary Care Clinical RN Care Coordinator  [facility]   [phone]

## 2019-10-02 ENCOUNTER — PATIENT OUTREACH (OUTPATIENT)
Dept: CARE COORDINATION | Facility: CLINIC | Age: 54
End: 2019-10-02

## 2019-10-02 NOTE — PROGRESS NOTES
Clinic Care Coordination Contact    Follow Up Progress Note      Assessment: RN ANAHI received a call from patient stating he needed assistance in scheduling/requesting orders for a paracentesis.  Patient has not followed through on seeing hepatology and cancelled appointments on 9/20/19 and 9/25/19.  Patient will need to reschedule.  RN CC spoke with [facility] regarding patient's request for paracentesis, [phone].  They will send the request to the provider.    Goals addressed this encounter:   Goals Addressed                 This Visit's Progress      #2 Improve chronic symptoms (pt-stated)   20%     Goal Statement: I want my stomach bloating to go down.  Measure of Success: Patient's will rate his abdomen bloating as tolerable.  Supportive Steps to Achieve: Patient working with [facility], paracentesis, medications, low sodium diet  Barriers: addressed  Strengths: care coordination  Date to Achieve By: 11/1/19  Patient expressed understanding of goal: yes           #3 Medical (pt-stated)   Not on track     Goal Statement: I will schedule an appointment with the liver specialist.  Measure of Success: Patient will schedule and attend the appointment with the liver specialist.  Supportive Steps to Achieve: RN CC assisting patient in scheduling.  Barriers: multiple appointments due to paracentesis  Strengths: care coordination  Date to Achieve By: 12/1/19  Patient expressed understanding of goal: yes                Intervention/Education provided during outreach: RN ANAHI reviewed importance of timely follow through with hepatology appointment.     Outreach Frequency: monthly    Plan:   1. Patient will await call back from [facility] to schedule paracentesis at [facility].  2. Patient to reschedule hepatology appointment.  3. Care Coordinator will follow up in 1-2 weeks.    Melissa Behl BSN, RN, PHN, [facility]  Primary Care Clinical RN Care Coordinator  [facility]   [phone]

## 2019-10-10 ENCOUNTER — PATIENT OUTREACH (OUTPATIENT)
Dept: CARE COORDINATION | Facility: CLINIC | Age: 54
End: 2019-10-10

## 2019-10-10 ASSESSMENT — ACTIVITIES OF DAILY LIVING (ADL): DEPENDENT_IADLS:: TRANSPORTATION;CLEANING;LAUNDRY

## 2019-10-10 NOTE — PROGRESS NOTES
"Clinic Care Coordination Contact    Follow Up Progress Note      Assessment: Patient has not rescheduled his PCP or hepatology appointment.  Patient cites having a cold has an obstacle at this time, but states he will continue to work on this.  Patient continues to endorse his \"usual bloating and stomach pain\" and will be calling today to schedule a paracentesis.  Noted per care everywhere patient has cancelled paracentesis procedures on 10/7/19, 10/8/19, 10/9/19 and 10/10/19.  Patient acknowledged this and states he will reschedule.      Goals addressed this encounter:   Goals Addressed                 This Visit's Progress      #1 Medical (pt-stated)   Not on track     Goal Statement: I will schedule an appointment with the liver specialist.  Measure of Success: Patient will schedule and attend the appointment with the liver specialist.  Supportive Steps to Achieve: RN CC assisting patient in scheduling.  Barriers: multiple appointments due to paracentesis  Strengths: care coordination  Date to Achieve By: 12/1/19  Patient expressed understanding of goal: yes           #2 Improve chronic symptoms (pt-stated)   20%     Goal Statement: I want my stomach bloating to go down.  Measure of Success: Patient's will rate his abdomen bloating as tolerable.  Supportive Steps to Achieve: Patient working with [facility], paracentesis, medications, low sodium diet  Barriers: addressed  Strengths: care coordination  Date to Achieve By: 11/1/19  Patient expressed understanding of goal: yes           #3 Medical (pt-stated)   Not on track     Goal Statement: I will schedule an appointment with Dr. Childs.  Measure of Success: Appointment will be scheduled and attended.  Supportive Steps to Achieve: Reviewed appointment due.  Barriers: unknown  Strengths: care coordination  Date to Achieve By: 12/1/19  Patient expressed understanding of goal: yes                Intervention/Education provided during outreach: RN CC reinforced importance of " timely follow through with PCP, hepatologist and his paracentesis procedures when needed.     Outreach Frequency: monthly    Plan:   1. Patient will schedule appointments with hepatology, PCP and his paracentesis.  2. Care Coordinator will follow up in 2 weeks.    Melissa Behl BSN, RN, PHN, [facility]  Primary Care Clinical RN Care Coordinator  [facility]   [phone]

## 2019-10-10 NOTE — LETTER
[facility]  Complex Care Plan  About Me:    Patient Name:  Marlon Mackey    YOB: 1965  Age:         54 year old   Derek MRN:    [id number] Telephone Information:  Home Phone [phone]   Mobile [phone]       Address:  [address]  [facility] [address] Email address:  No e-mail address on record      Emergency Contact(s)    Name Relationship Lgl Grd Work Phone Home Phone Mobile Phone   1. ESVIN MACKEY Mother   [phone]    2. NO SECONDARY C*    None            Primary language:  English     needed? No   [facility]:  [phone] op. 1  Other communication barriers: Physical impairment  Preferred Method of Communication:  Mail  Current living arrangement: I live alone  Mobility Status/ Medical Equipment: Independent w/Device    Health Maintenance  Health Maintenance Reviewed: Due/Overdue   Health Maintenance Due   Topic Date Due     PREVENTIVE CARE VISIT  1965     HF ACTION PLAN  1965     ADVANCE CARE PLANNING  1965     DEPRESSION ACTION PLAN  1965     COLONOSCOPY  09/05/1974     PNEUMOCOCCAL IMMUNIZATION 19-64 MEDIUM RISK (1 of 1 - PPSV23) 09/05/1983     ZOSTER IMMUNIZATION (1 of 2) 09/05/2014     LIPID  06/07/2019     INFLUENZA VACCINE (1) 09/01/2019     PHQ-9  11/06/2019        My Access Plan  Medical Emergency 911   Primary Clinic Line [facility] - [phone]   24 Hour Appointment Line [phone] or  [phone]) (toll-free)   24 Hour Nurse Line [phone] (toll-free)   Preferred Urgent Care [facility], [phone]   [facility]  [phone]   Preferred Pharmacy [facility], [place] - [address]     Behavioral Health Crisis Line The National Suicide Prevention Lifeline at 1-972.639.7831 or 911             My Care Team Members  Patient Care Team        Relationship Specialty Notifications Start End    Frank Childs MD PCP - General Internal Medicine  10/14/15     Phone: [phone] Fax: [phone]         [address] [facility] [address]    Frank Childs MD Assigned PCP   4/1/18     Phone: [phone] Fax: [phone]         [address] [place] [facility] [address]    Behl, Melissa K, RN Lead Care Coordinator Primary Care - CC Admissions 7/10/19     Phone: [phone] Fax: [phone]        Robert Rudolph MD Physician Gastroenterology  7/10/19     Phone: [phone] Fax: [phone]         [facility] [address] [address] [facility] [address]            My Care Plans  Self Management and Treatment Plan  Goals and (Comments)  Goals        General    #2 Improve chronic symptoms (pt-stated)     Notes - Note edited  9/10/2019 11:43 AM by Behl, Melissa K, RN    Goal Statement: I want my stomach bloating to go down.  Measure of Success: Patient's will rate his abdomen bloating as tolerable.  Supportive Steps to Achieve: Patient working with [facility], paracentesis, medications, low sodium diet  Barriers: addressed  Strengths: care coordination  Date to Achieve By: 11/1/19  Patient expressed understanding of goal: yes         #3 Medical (pt-stated)     Notes - Note created  9/10/2019 11:45 AM by Behl, Melissa K, RN    Goal Statement: I will schedule an appointment with the liver specialist.  Measure of Success: Patient will schedule and attend the appointment with the liver specialist.  Supportive Steps to Achieve: RN CC assisting patient in scheduling.  Barriers: multiple appointments due to paracentesis  Strengths: care coordination  Date to Achieve By: 12/1/19  Patient expressed understanding of goal: yes                Action Plans on File:                       Advance Care Plans/Directives Type:        My Medical and Care Information  Problem List   Patient Active Problem List   Diagnosis     CARDIOVASCULAR SCREENING; LDL  GOAL LESS THAN 130     Overweight (BMI 25.0-29.9)     Tobacco abuse     Cervical radiculopathy     Cardiomyopathy, alcoholic (H)     Tinnitus, bilateral     Chronic systolic congestive heart failure (H)     Alcoholic cirrhosis of liver with ascites (H)     Alcohol abuse     Diarrhea, unspecified type     ROSIBEL (acute kidney injury) (H)     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    Care Coordination Start Date: 7/10/2019   Frequency of Care Coordination: monthly   Form Last Updated: 10/10/2019

## 2019-10-14 ENCOUNTER — PATIENT OUTREACH (OUTPATIENT)
Dept: CARE COORDINATION | Facility: CLINIC | Age: 54
End: 2019-10-14

## 2019-10-14 NOTE — PROGRESS NOTES
Clinic Care Coordination Contact  [facility]/Voicemail       Clinical Data: Care Coordinator Outreach  Patient left a voicemail for writer at 1:55 pm stating he wanted to schedule an appointment with Dr. Childs for follow up and requested a call back.  Outreach attempted x 1.  Left message on patient's voicemail with call back information and requested return call.  Plan: Care Coordinator will follow up with patient as previous planned.    Melissa Behl BSN, RN, PHN, CCM  Primary Care Clinical RN Care Coordinator  [facility]   [phone]

## 2019-10-21 ENCOUNTER — TELEPHONE (OUTPATIENT)
Dept: FAMILY MEDICINE | Facility: CLINIC | Age: 54
End: 2019-10-21

## 2019-10-21 DIAGNOSIS — K70.31 ALCOHOLIC CIRRHOSIS OF LIVER WITH ASCITES (H): Primary | ICD-10-CM

## 2019-10-21 NOTE — TELEPHONE ENCOUNTER
Reason for Call:  Other call back    Detailed comments: Wants to go to [facility] for Perentesis but was told his doctor needs to ok it first before they will him. Call Marlon as soon as it has been ok'd so he can call the hospital and try to get in as soon as possible.  Thank you    Phone Number Patient can be reached at: [phone] (S)    Best Time: Any    Can we leave a detailed message on this number? YES    Call taken on 10/21/2019 at 4:31 PM by Farzad Christy

## 2019-10-22 ENCOUNTER — MEDICAL CORRESPONDENCE (OUTPATIENT)
Dept: HEALTH INFORMATION MANAGEMENT | Facility: CLINIC | Age: 54
End: 2019-10-22

## 2019-10-22 ENCOUNTER — TELEPHONE (OUTPATIENT)
Dept: FAMILY MEDICINE | Facility: CLINIC | Age: 54
End: 2019-10-22

## 2019-10-22 NOTE — TELEPHONE ENCOUNTER
Review of records show that patient had ultrasound-guided paracentesis through interventional radiology appointments at [facility] at least 15 times since ER visit for ascites last 6/6/2019.    Orders done, printed and placed in TC basket for faxing.

## 2019-10-22 NOTE — TELEPHONE ENCOUNTER
Reason for Call:  Other form    Detailed comments: received an order and need clarification. Marya is faxing right now a form, and that just needs to be filled out, and faxed to [phone].    Phone Number Patient can be reached at: Other phone number:  [phone]    Best Time: any    Can we leave a detailed message on this number? YES    Call taken on 10/22/2019 at 12:25 PM by Saloni Posey

## 2019-10-22 NOTE — TELEPHONE ENCOUNTER
Reason for Call:  Other Referral    Detailed comments: Pt calling to follow up on referral request and would like for Dr. Childs to send referral as soon as possible for his condition is not improving.  He would like a call back as soon as possible to day to confirm Referral has been sent.    Phone Number Patient can be reached at: Cell number on file:    Telephone Information:   Mobile [phone]       Best Time: anytime    Can we leave a detailed message on this number? YES    Call taken on 10/22/2019 at 8:40 AM by Jose Rafael Browne

## 2019-10-22 NOTE — TELEPHONE ENCOUNTER
Called spoke to patient, he has their phone number, [phone]. To call and get a fax #.  Notified patient once I get a fax # I will be faxing this order for patient today.  Papito Stahl,  [facility]    Called to [facility] spoke with Ana María, she gave me fax # [phone] to fax the orders to their schedulers.  Papito Stahl,  [facility]    Order is faxed.  Papito Stahl,  [facility]

## 2019-10-24 ENCOUNTER — PATIENT OUTREACH (OUTPATIENT)
Dept: CARE COORDINATION | Facility: CLINIC | Age: 54
End: 2019-10-24

## 2019-10-24 ENCOUNTER — TELEPHONE (OUTPATIENT)
Dept: FAMILY MEDICINE | Facility: CLINIC | Age: 54
End: 2019-10-24

## 2019-10-24 NOTE — PROGRESS NOTES
Clinic Care Coordination Contact    Situation: Patient chart reviewed by care coordinator.    Background: RN CC reviewing chart for follow up.    Assessment: Noted patient undergoing paracentesis today.    Plan/Recommendations: RN CC will follow up with patient in 1-2 business days.    Melissa Behl BSN, RN, PHN, [facility]  Primary Care Clinical RN Care Coordinator  [facility]   [phone]

## 2019-10-24 NOTE — TELEPHONE ENCOUNTER
Reason for Call:  Other     Detailed comments: Needs to talk with triage right away procedure for fluid removal right now.    Phone Number can be reached at: [facility] [employer] [phone]    Best Time: any    Can we leave a detailed message on this number? YES    Transferring to triage    Call taken on 10/24/2019 at 2:05 PM by Britney Patel

## 2019-10-24 NOTE — TELEPHONE ENCOUNTER
Britt from [facility] calling to report 5 liters of fluid taken off of patient during paracentesis, just now. Patient is requesting more fluid to come off, needle still in patient. They only have an order for 5 liters. They also wish to relay during paracentesis patient's blood pressure was 68/40 and 60/40. Currently patient's blood pressure is 95/59.     Huddled with provider who said it is ok to take off one more liter of fluid but if staff feels his blood pressure goes too low they can stop at anytime.     Britt contacted again and relayed orders per provider.     Britt needs a written order for their records faxed to #[phone].     Provider, please write out the order and our team can fax it.     Kirti Alvarado RN  [facility]

## 2019-10-24 NOTE — LETTER
Marlon Mackey  [address] [place] [address]      October 24, 2019      To Whom It May Concern:                                                 Regarding the case of Mr. Marlon Mackey, I recommend adjusting the amount of ascitic fluid drained to maximum of 6 liters during his paracentesis. If patient becomes hypotensive, if systolic blood pressure is less than 60 or if patient develops dizziness, then discontinue paracentesis. For any questions, you may call my office at [phone].    Sincerely,         Frank Childs MD  Internal Medicine

## 2019-10-25 NOTE — PROGRESS NOTES
"Clinic Care Coordination Contact    Follow Up Progress Note      Assessment: RN ANAHI spoke with patient who reports feeling some improvement in abdominal bloating since paracentesis yesterday, but states he still feels some abdominal fullness, \"they didn't take off as much as they normally do.\"  Patient was also confused on follow up plan with [facility].  RN ANAHI contacted [facility].  Patient had not shown to his hepatology appointment and had cancelled X2.  Patient has a future appointment scheduled with hepatology C-NP at the [facility] on 12/3/19 at 7:25 am.  Patient informed [facility] of patient request for additional paracentesis treatments until his future hepatology appointments.  Writer was informed a message would be sent to GI provider Dr. Rudolph with patient's request.   RN CC reviewed in detail with teach back the above information with patient.    RN CC reviewed with patient he is overdue for a PCP appointment.  Noted patient has cancelled or had No Show appointments with PCP 9/20/19, 9/25/19 and 10/15/19.  Patient scheduled a future appointment for 10/30/19.      Goals addressed this encounter:   Goals Addressed                 This Visit's Progress      #1 Medical (pt-stated)   On track     Goal Statement: I will schedule an appointment with the liver specialist.  Measure of Success: Patient will schedule and attend the appointment with the liver specialist.  Supportive Steps to Achieve: RN ANAHI assisting patient in scheduling.  Barriers: multiple appointments due to paracentesis  Strengths: care coordination  Date to Achieve By: 12/5/19  Patient expressed understanding of goal: yes           #2 Improve chronic symptoms (pt-stated)   20%     Goal Statement: I want my stomach bloating to go down.  Measure of Success: Patient's will rate his abdomen bloating as tolerable.  Supportive Steps to Achieve: Patient working with [facility], paracentesis, medications, low sodium diet  Barriers: addressed  Strengths: care " coordination  Date to Achieve By: 11/1/19  Patient expressed understanding of goal: yes           #3 Medical (pt-stated)   On track     Goal Statement: I will schedule an appointment with Dr. Childs.  Measure of Success: Appointment will be scheduled and attended.  Supportive Steps to Achieve: Reviewed appointment due.  Barriers: unknown  Strengths: care coordination  Date to Achieve By: 12/1/19  Patient expressed understanding of goal: yes                Intervention/Education provided during outreach: RN CC reviewed with patient the importance of keeping and attending appointments with PCP and hepatology due to his chronic cirrhosis of liver with ascites.  RN CC assisted patient in scheduling PCP appointment, clarified plan with [facility] and relayed request for additional paracentesis orders with [facility]/Dr. Rudolph.     Outreach Frequency: 2 weeks    Plan:   1. Patient will see Dr. Childs as scheduled 10/30/19.  2. Patient will keep appointment with hepatology as scheduled 12/3/19 at 7:25 am at [facility] office.  3. Patient will contact writer if no response from ANNIE/Dr. Rudolph by 10/28/19 regarding his request for additional paracentesis orders.  4. Care Coordinator will follow up in 2 weeks.    Melissa Behl BSN, RN, PHN, CCM  Primary Care Clinical RN Care Coordinator  [facility]   [phone]

## 2019-10-28 NOTE — PROGRESS NOTES
"Clinic Care Coordination Contact  Care Team Conversations    Patient contacted writer to state [facility] had not received orders for a paracentesis \"and [facility] said I don't have an appointment this week and I'm not sure what to do.\"    RN CC contacted [facility] [phone] who states Dr. Rudolph was just signing orders and they would be faxing them over to [facility] who would contact patient to schedule.    RN CC updated patient with the above information who verbalized understanding.    Melissa Behl BSN, RN, PHN, [facility]  Primary Care Clinical RN Care Coordinator  [facility]   [phone]         "

## 2019-11-04 ENCOUNTER — PATIENT OUTREACH (OUTPATIENT)
Dept: CARE COORDINATION | Facility: CLINIC | Age: 54
End: 2019-11-04

## 2019-11-04 NOTE — PROGRESS NOTES
Clinic Care Coordination Contact    Follow Up Progress Note      Assessment: Patient contacted writer to state he had his paracentesis last week, however, he would like to set up future paracentesis orders until his hepatology visit 12/3/19.  RN ANAHI noted patient did not show to his PCP appointment 10/30/19 with Dr. Childs.  Patient states he forgot.  He states he will call back to reschedule.    Goals addressed this encounter:   Goals Addressed                 This Visit's Progress      #1 Medical (pt-stated)   On track     Goal Statement: I will schedule an appointment with the liver specialist.  Measure of Success: Patient will schedule and attend the appointment with the liver specialist.  Supportive Steps to Achieve: RN CC assisting patient in scheduling.  Barriers: multiple appointments due to paracentesis  Strengths: care coordination  Date to Achieve By: 12/5/19  Patient expressed understanding of goal: yes           #2 Improve chronic symptoms (pt-stated)   30%     Goal Statement: I want my stomach bloating to go down.  Measure of Success: Patient's will rate his abdomen bloating as tolerable.  Supportive Steps to Achieve: Patient working with [facility], paracentesis, medications, low sodium diet  Barriers: addressed  Strengths: care coordination  Date to Achieve By: 1/1/20  Patient expressed understanding of goal: yes           #3 Medical (pt-stated)   Not on track     Goal Statement: I will schedule an appointment with Dr. Childs.  Measure of Success: Appointment will be scheduled and attended.  Supportive Steps to Achieve: Reviewed appointment due.  Barriers: unknown  Strengths: care coordination  Date to Achieve By: 12/1/19  Patient expressed understanding of goal: yes                Intervention/Education provided during outreach: RN CC contacted [facility] to relay patient's request for paracentesis orders ([phone]).  RN CC spoke with Beckie who will contact writer once orders are in.  Beckie states   [facility] will not be putting in more orders if patient misses his future hepatology appointment and has informed patient of this.    RN CC reviewed importance of follow up with PCP and attendance of future hepatology appointment.     Outreach Frequency: monthly    Plan:   1. Patient will reschedule appointment with PCP.  2. Patient will await call back from [facility] regarding request for paracentesis orders.    3. RN Care Coordinator will follow up in 3-4 weeks.    Melissa Behl BSN, RN, PHN, [facility]  Primary Care Clinical RN Care Coordinator  [facility]   [phone]

## 2019-11-05 NOTE — PROGRESS NOTES
Clinic Care Coordination Contact  Care Team Conversations    RN CC received update from Beckie with [facility] ([phone] ext [phone]) stating she verified with [facility] they were able to accept standing orders through a specific date, December 3rd when patient will meet with his hepatologist.  Beckie will check with Dr. Rudolph today to see if these can be signed.    RN CC informed patient of the above information and reinforced the importance of attending the December 3rd hepatology appointment.  RN CC offered to rescheduled PCP appointment, however, patient declined stating he would call back.    Plan: RN CC will follow up with patient as planned.    Melissa Behl BSN, RN, PHN, CCM  Primary Care Clinical RN Care Coordinator  [facility]   [phone]

## 2019-11-06 NOTE — PROGRESS NOTES
Clinic Care Coordination Contact  Care Team Conversations    Patient contacted writer to determine status of paracentesis orders.  RN CC contacted [facility] [phone] and spoke with Beckie who states Dr. Rudolph approved paracentesis orders through 12/3/19, patient will then need to obtain orders from the hepatologist.  RN CC informed patient of the above information and patient verbalized understanding.  Patient stated he was ready to see PCP and states he has a bulge behind his navel that he would like evaluated.  Appointment scheduled for 11/7/19 at 3:40 pm.    Melissa Behl BSN, RN, PHN, CCM  Primary Care Clinical RN Care Coordinator  [facility]   [phone]

## 2019-11-15 ENCOUNTER — OFFICE VISIT (OUTPATIENT)
Dept: FAMILY MEDICINE | Facility: CLINIC | Age: 54
End: 2019-11-15
Payer: COMMERCIAL

## 2019-11-15 VITALS
SYSTOLIC BLOOD PRESSURE: 99 MMHG | HEIGHT: 71 IN | WEIGHT: 168 LBS | DIASTOLIC BLOOD PRESSURE: 61 MMHG | HEART RATE: 86 BPM | RESPIRATION RATE: 16 BRPM | TEMPERATURE: 98.1 F | OXYGEN SATURATION: 98 % | BODY MASS INDEX: 23.52 KG/M2

## 2019-11-15 DIAGNOSIS — K70.31 ALCOHOLIC CIRRHOSIS OF LIVER WITH ASCITES (H): ICD-10-CM

## 2019-11-15 DIAGNOSIS — K42.9 UMBILICAL HERNIA WITHOUT OBSTRUCTION AND WITHOUT GANGRENE: Primary | ICD-10-CM

## 2019-11-15 DIAGNOSIS — K70.31 ASCITES DUE TO ALCOHOLIC CIRRHOSIS (H): ICD-10-CM

## 2019-11-15 PROCEDURE — 99214 OFFICE O/P EST MOD 30 MIN: CPT | Performed by: INTERNAL MEDICINE

## 2019-11-15 ASSESSMENT — PAIN SCALES - GENERAL: PAINLEVEL: MODERATE PAIN (4)

## 2019-11-15 ASSESSMENT — MIFFLIN-ST. JEOR: SCORE: 1619.17

## 2019-11-15 NOTE — PROGRESS NOTES
Subjective     Marlon Mackey is a 54 year old male who presents to clinic today for the following health issues:    HPI   Hernia      Duration: 3-4 weeks    Description (location/character/radiation): hernia by belly button    Intensity:  moderate    Accompanying signs and symptoms: discomfort    History (similar episodes/previous evaluation): None    Precipitating or alleviating factors: None    Therapies tried and outcome: None. Despite paracentesis yesterday, wherein 10 liters of ascitic fluid wee aspirated, his condition did not improve.         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)     Ulcer of esophagus without bleeding     Duodenal ulcer without hemorrhage or perforation and without obstruction     Past Surgical History:   Procedure Laterality Date     ORTHOPEDIC SURGERY Right     arm       Social History     Tobacco Use     Smoking status: Current Every Day Smoker     Packs/day: 1.00     Smokeless tobacco: Never Used   Substance Use Topics     Alcohol use: Yes     Comment: weekly     Family History   Problem Relation Age of Onset     Cancer Father      Family History Negative Other          No Known Allergies  Recent Labs   Lab Test 05/13/19  1525 04/04/19  1202 02/19/19  1216  06/07/18  1216 06/07/18  1207   LDL  --   --   --   --  73 69   HDL  --   --   --   --  51 53   TRIG  --   --   --   --  136 135   ALT 23 41 58   < >  --   --    CR 1.14 1.84*  --    < >  --  0.77   GFRESTIMATED 72 40*  --    < >  --  >90   GFRESTBLACK 84 47*  --    < >  --  >90  "  POTASSIUM 4.0 2.8*  --    < >  --  4.2    < > = values in this interval not displayed.      BP Readings from Last 3 Encounters:   11/21/19 111/71   11/15/19 99/61   06/10/19 (!) 81/52    Wt Readings from Last 3 Encounters:   11/21/19 83 kg (183 lb)   11/15/19 76.2 kg (168 lb)   06/10/19 83.6 kg (184 lb 3.2 oz)                      Reviewed and updated as needed this visit by Provider         Review of Systems   ROS COMP: CONSTITUTIONAL: NEGATIVE for fever, chills, change in weight  INTEGUMENTARY/SKIN: NEGATIVE for worrisome rashes, moles or lesions  EYES: NEGATIVE for vision changes or irritation  ENT/MOUTH: NEGATIVE for ear, mouth and throat problems  RESP: NEGATIVE for significant cough or SOB  CV: NEGATIVE for chest pain, palpitations or peripheral edema  GI: NEGATIVE for nausea, abdominal pain, heartburn, or change in bowel habits  : NEGATIVE for frequency, dysuria, or hematuria  MUSCULOSKELETAL: NEGATIVE for significant arthralgias or myalgia  NEURO: NEGATIVE for weakness, dizziness or paresthesias  ENDOCRINE: NEGATIVE for temperature intolerance, skin/hair changes  HEME: NEGATIVE for bleeding problems  PSYCHIATRIC: NEGATIVE for changes in mood or affect      Objective    BP 99/61 (BP Location: Left arm, Patient Position: Sitting, Cuff Size: Adult Regular)   Pulse 86   Temp 98.1  F (36.7  C) (Oral)   Resp 16   Ht 1.803 m (5' 11\")   Wt 76.2 kg (168 lb)   SpO2 98%   BMI 23.43 kg/m    Body mass index is 23.43 kg/m .  Physical Exam   GENERAL: healthy, alert and no distress  EYES: Eyes grossly normal to inspection, PERRL and conjunctivae and sclerae normal  HENT: ear canals and TM's normal, nose and mouth without ulcers or lesions  NECK: no adenopathy, no asymmetry, masses, or scars and thyroid normal to palpation  RESP: lungs clear to auscultation - no rales, rhonchi or wheezes  CV: regular rate and rhythm, normal S1 S2, no S3 or S4, no murmur, click or rub, no peripheral edema and peripheral pulses " "strong  ABDOMEN: soft, nontender, no hepatosplenomegaly, no masses and bowel sounds normal  MS: no gross musculoskeletal defects noted, no edema  SKIN: no suspicious lesions or rashes  NEURO: Normal strength and tone, mentation intact and speech normal  PSYCH: mentation appears normal, affect normal/bright    Diagnostic Test Results:  Labs reviewed in Epic        Assessment & Plan     1. Umbilical hernia without obstruction and without gangrene    - GENERAL SURG ADULT REFERRAL; Future    2. Alcoholic cirrhosis of liver with ascites (H)      3. Ascites due to alcoholic cirrhosis (H)    - US Paracentesis; Future     Tobacco Cessation:   reports that he has been smoking. He has been smoking about 1.00 pack per day. He has never used smokeless tobacco.  Tobacco Cessation Action Plan: Information offered: Patient not interested at this time      BMI:   Estimated body mass index is 23.43 kg/m  as calculated from the following:    Height as of this encounter: 1.803 m (5' 11\").    Weight as of this encounter: 76.2 kg (168 lb).           FUTURE APPOINTMENTS:       - Follow-up visit in 4 weeks.    Frank Childs MD  [facility]      "

## 2019-11-15 NOTE — PATIENT INSTRUCTIONS
At [facility], we strive to deliver an exceptional experience to you, every time we see you.  If you receive a survey in the mail, please send us back your thoughts. We really do value your feedback.    Based on your medical history, these are the current health maintenance/preventive care services that you are due for (some may have been done at this visit.)  Health Maintenance Due   Topic Date Due     PREVENTIVE CARE VISIT  1964     HF ACTION PLAN  1964     ADVANCE CARE PLANNING  1964     DEPRESSION ACTION PLAN  1964     COLONOSCOPY  09/05/1974     PNEUMOCOCCAL IMMUNIZATION 19-64 MEDIUM RISK (1 of 1 - PPSV23) 09/05/1983     ZOSTER IMMUNIZATION (1 of 2) 09/05/2014     LIPID  06/07/2019     INFLUENZA VACCINE (1) 09/01/2019     BMP  11/13/2019     PHQ-9  11/06/2019         Suggested websites for health information:  Www.[facility].Blushr : Up to date and easily searchable information on multiple topics.  Www.medlineplus.gov : medication info, interactive tutorials, watch real surgeries online  Www.familydoctor.org : good info from the Academy of Family Physicians  Www.cdc.gov : public health info, travel advisories, epidemics (H1N1)  Www.aap.org : children's health info, normal development, vaccinations  Www.health.[facility].[place].us : [place] dept of health, public health issues in [place], N1N1    Your care team:                            Family Medicine Internal Medicine   MD Frank Barkley MD Shantel Branch-Fleming, MD Katya Georgiev PA-C Nam Ho, MD Pediatrics   CHRISTY Aranda, MD Hyacinth Alonso CNP, MD Deborah Mielke, MD Kim Thein, APRN CNP      Clinic hours: Monday - Thursday 7 am-7 pm; Fridays 7 am-5 pm.   Urgent care: Monday - Friday 11 am-9 pm; Saturday and Sunday 9 am-5 pm.  Pharmacy : Monday -Thursday 8 am-8 pm; Friday 8 am-6 pm; Saturday and Sunday 9 am-5 pm.     Clinic: [phone]    Pharmacy: [phone]

## 2019-11-18 ENCOUNTER — TELEPHONE (OUTPATIENT)
Dept: FAMILY MEDICINE | Facility: CLINIC | Age: 54
End: 2019-11-18

## 2019-11-18 NOTE — TELEPHONE ENCOUNTER
Patient is referring to paracentesis orders.    Will complete orders tomorrow once Papito Stahl is back in office (she has blank copies of standing orders for paracentesis, usually done at [facility]).

## 2019-11-18 NOTE — TELEPHONE ENCOUNTER
Reason for Call: Request for an order or referral:    Order or referral being requested: Order    Date needed: as soon as possible    Has the patient been seen by the PCP for this problem? YES    Additional comments: Pt calling for he is looking for a standing order for a lab.  Pt could not specify order but he mentioned Dr. Childs was to put in an order this week as soon as possible.  He would like a call back to confirm order has been placed so that he can make an appointment.  Phone number Patient can be reached at:  Cell number on file:    Telephone Information:   Mobile [phone]       Best Time:  anytime    Can we leave a detailed message on this number?  YES    Call taken on 11/18/2019 at 9:44 AM by Jose Rafael reyna

## 2019-11-19 ENCOUNTER — TELEPHONE (OUTPATIENT)
Dept: FAMILY MEDICINE | Facility: CLINIC | Age: 54
End: 2019-11-19

## 2019-11-19 NOTE — TELEPHONE ENCOUNTER
Orders is faxed to fax # [phone] with a note for [facility] to call patient to get him scheduled.  Papito Stahl,  For 1st Floor Primary Care ([facility])

## 2019-11-19 NOTE — TELEPHONE ENCOUNTER
Patient contacted RN CC to inquire status of paracentesis orders.  Patient would like to have his paracentesis completed on 11/20/19.  Patient would like a call once these have been faxed to [facility] [phone]).    Melissa Behl BSN, RN, PHN, CCM  Primary Care Clinical RN Care Coordinator  [facility]   [phone]

## 2019-11-19 NOTE — TELEPHONE ENCOUNTER
.Reason for Call:  order    Detailed comments: They have the check list, needs order for paracentesis,  diagnosis, if diagnostic or therapeutic, and signature, Thank you  *appointment not yet scheduled pending on information requested;   Fax to [phone]      Phone Number Patient can be reached at:  phone number:  [phone]    Best Time: any    Can we leave a detailed message on this number? Not Applicable    Call taken on 11/19/2019 at 10:14 AM by Mary Herman

## 2019-11-21 ENCOUNTER — OFFICE VISIT (OUTPATIENT)
Dept: FAMILY MEDICINE | Facility: CLINIC | Age: 54
End: 2019-11-21
Payer: COMMERCIAL

## 2019-11-21 VITALS
DIASTOLIC BLOOD PRESSURE: 71 MMHG | SYSTOLIC BLOOD PRESSURE: 111 MMHG | HEART RATE: 79 BPM | OXYGEN SATURATION: 97 % | HEIGHT: 71 IN | WEIGHT: 183 LBS | BODY MASS INDEX: 25.62 KG/M2 | TEMPERATURE: 98.2 F

## 2019-11-21 DIAGNOSIS — Z12.11 SCREEN FOR COLON CANCER: Primary | ICD-10-CM

## 2019-11-21 DIAGNOSIS — Z53.9 ERRONEOUS ENCOUNTER--DISREGARD: ICD-10-CM

## 2019-11-21 DIAGNOSIS — Z01.818 PREOP GENERAL PHYSICAL EXAM: ICD-10-CM

## 2019-11-21 ASSESSMENT — MIFFLIN-ST. JEOR: SCORE: 1687.21

## 2019-11-21 ASSESSMENT — PAIN SCALES - GENERAL: PAINLEVEL: NO PAIN (0)

## 2019-11-21 NOTE — PROGRESS NOTES
APPOINTMENT NOT NEEDED.    MARYANN Turner MA      This encounter was opened in error. Please disregard.

## 2019-11-21 NOTE — PATIENT INSTRUCTIONS
At [facility], we strive to deliver an exceptional experience to you, every time we see you. If you receive a survey, please complete it as we do value your feedback.  If you have MyChart, you can expect to receive results automatically within 24 hours of their completion.  Your provider will send a note interpreting your results as well.   If you do not have MyChart, you should receive your results in about a week by mail.    Your care team:                            Family Medicine Internal Medicine   MD Frank Barkley MD Shantel Branch-Fleming, MD Katya Georgiev PA-C Megan Hill, APRN CNP    Kaveh Quintanilla, MD Pediatrics   Trino Coy, PAVioletC  Gilma Barnes, MD Gypsy Ulloa APRN CNP   MD Hyacinth Pepper MD Deborah Mielke, MD Kim Thein, APRN CNP      Clinic hours: Monday - Thursday 7 am-7 pm; Fridays 7 am-5 pm.   Urgent care: Monday - Friday 11 am-9 pm; Saturday and Sunday 9 am-5 pm.  Pharmacy : Monday -Thursday 8 am-8 pm; Friday 8 am-6 pm; Saturday and Sunday 9 am-5 pm.     Clinic: [phone]   Pharmacy: [phone]        Before Your Surgery      Call your surgeon if there is any change in your health. This includes signs of a cold or flu (such as a sore throat, runny nose, cough, rash or fever).    Do not smoke, drink alcohol or take over the counter medicine (unless your surgeon or primary care doctor tells you to) for the 24 hours before and after surgery.    If you take prescribed drugs: Follow your doctor s orders about which medicines to take and which to stop until after surgery.    Eating and drinking prior to surgery: follow the instructions from your surgeon    Take a shower or bath the night before surgery. Use the soap your surgeon gave you to gently clean your skin. If you do not have soap from your surgeon, use your regular soap. Do not shave or scrub the surgery site.  Wear clean pajamas and have clean  sheets on your bed.

## 2019-11-22 ENCOUNTER — TELEPHONE (OUTPATIENT)
Dept: FAMILY MEDICINE | Facility: CLINIC | Age: 54
End: 2019-11-22

## 2019-11-22 NOTE — TELEPHONE ENCOUNTER
Panel Management Review   One phone call and send letter if unable to reach them or Skaihart message and send letter if not read after 2 weeks (You will get a message to your inbasket)          Health Maintenance Due   Topic Date Due     PREVENTIVE CARE VISIT  1965     HF ACTION PLAN  1964     ADVANCE CARE PLANNING  1964     DEPRESSION ACTION PLAN  1964     COLONOSCOPY  09/05/1974     PNEUMOCOCCAL IMMUNIZATION 19-64 MEDIUM RISK (1 of 1 - PPSV23) 09/05/1983     ZOSTER IMMUNIZATION (1 of 2) 09/05/2014     LIPID  06/07/2019     INFLUENZA VACCINE (1) 09/01/2019     PHQ-9  11/06/2019     BMP  11/13/2019        Fail List measure:         Patient is due/failing the following:   COLONOSCOPY    Action needed:   none    Type of outreach:    Sent letter.    Questions for provider review:    None                                                                                                                           Regulo Turner MA

## 2019-11-22 NOTE — LETTER
November 22, 2019    Marlon Mackey  [address]  [place] [address]    Dear Marlon Mackey,     At [facility] we care about your health and are committed to providing quality patient care.    Which includes staying current on preventive cancer screenings.  You can increase your chances of finding and treating cancers through regular screenings.      Our records indicate you may be due for the following preventive screening(s):    Colonoscopy    Colonoscopy is recommended every ten years for everyone age 50 and older. We strongly urge our patient's to consider having a colonoscopy done, which is the best screening test available and only needs to be done every 10 years if normal. If you are unwilling or unable to have a colonoscopy then we recommend the annual stool testing for blood. This test is called a FIT test and it looks for blood in the stool.     To schedule an appointment for your colonoscopy, please see the attached referral.     To schedule an appointment or discuss this screening further, you may contact us by phone at the [facility] at [phone] or online through the patient portal/[email or web address] @ [email or web address]    If you have had any of the screenings listed above at another facility, please call us so that we may update your chart.      Your partners in health,      Quality Committee at [facility]

## 2019-11-25 ENCOUNTER — PATIENT OUTREACH (OUTPATIENT)
Dept: CARE COORDINATION | Facility: CLINIC | Age: 54
End: 2019-11-25

## 2019-11-25 NOTE — PROGRESS NOTES
Clinic Care Coordination Contact    Follow Up Progress Note      Assessment: RN CC contacted patient for follow up.  Patient was seen by PCP for follow up 11/15/19.  Patient was referred to general surgery for an umbilical hernia, future appointment 11/27/19.    Patient states he is scheduled for a paracentesis tomorrow and will plan on attending his hepatology appointment 12/3/19.  No other questions or concerns at this time.    Goals addressed this encounter:   Goals Addressed                 This Visit's Progress      #1 Medical (pt-stated)   On track     Goal Statement: I will schedule an appointment with the liver specialist.  Measure of Success: Patient will schedule and attend the appointment with the liver specialist.  Supportive Steps to Achieve: RN CC assisting patient in scheduling.  Barriers: multiple appointments due to paracentesis  Strengths: care coordination  Date to Achieve By: 12/5/19  Patient expressed understanding of goal: yes           #2 Improve chronic symptoms (pt-stated)   30%     Goal Statement: I want my stomach bloating to go down.  Measure of Success: Patient's will rate his abdomen bloating as tolerable.  Supportive Steps to Achieve: Patient working with [facility], paracentesis, medications, low sodium diet  Barriers: addressed  Strengths: care coordination  Date to Achieve By: 1/1/20  Patient expressed understanding of goal: yes           COMPLETED: #3 Medical (pt-stated)        Goal Statement: I will schedule an appointment with Dr. Childs.  Measure of Success: Appointment will be scheduled and attended.  Supportive Steps to Achieve: Reviewed appointment due.  Barriers: unknown  Strengths: care coordination  Date to Achieve By: 12/1/19  Patient expressed understanding of goal: yes                Intervention/Education provided during outreach: RN CC reviewed importance of attending future hepatology appointment.     Outreach Frequency: monthly    Plan:   1. Patient will attend general  surgery appointment 11/27/19 and hepatology appointment 12/3/19.  2. RN Care Coordinator will follow up in 3-4 weeks.  Melissa Behl BSN, RN, PHN, CCM  Primary Care Clinical RN Care Coordinator  [facility]   [phone]

## 2019-12-01 ENCOUNTER — NURSE TRIAGE (OUTPATIENT)
Dept: NURSING | Facility: CLINIC | Age: 54
End: 2019-12-01

## 2019-12-03 ENCOUNTER — PATIENT OUTREACH (OUTPATIENT)
Dept: CARE COORDINATION | Facility: CLINIC | Age: 54
End: 2019-12-03

## 2019-12-03 NOTE — PROGRESS NOTES
Clinic Care Coordination Contact  Care Team Conversations    RN CC received an update from Beckie from [facility] ([phone], ext [phone]).  Patient was scheduled to establish care with hepatology 12/3/19.  Patient called [facility] 12/2/19 and rescheduled his appointment for 1/7/20.  Patient cited having a paracentesis 12/3/19 as the reason.  Beckie states that the GI provider she works for had stated they would place no further paracentesis orders until patient established with hepatology after 12/3/19.  Beckie states she will talk with their provider and contact writer if there are any further updates.  FYI to PCP.    Melissa Behl BSN, RN, PHN, CCM  Primary Care Clinical RN Care Coordinator  [facility]   [phone]

## 2019-12-04 ENCOUNTER — TELEPHONE (OUTPATIENT)
Dept: FAMILY MEDICINE | Facility: CLINIC | Age: 54
End: 2019-12-04

## 2019-12-04 DIAGNOSIS — E87.6 HYPOKALEMIA: ICD-10-CM

## 2019-12-04 NOTE — TELEPHONE ENCOUNTER
Patient calling requesting paracentesis orders until he can be seen by [facility] 12/11/19.  Patient has these done at [facility], fax [phone].  Patient states he would like to have another paracentesis done prior to 12/11/19.    Please advise.    Melissa Behl BSN, RN, PHN, CCM  Primary Care Clinical RN Care Coordinator  [facility]   [phone]

## 2019-12-04 NOTE — TELEPHONE ENCOUNTER
Patient just had paracentesis yesterday (12/3/2019).    Review of records show that patient should establish care with [facility] prior to future paracentesis (indication: alcoholic liver cirrhosis).  Marlon has an appointment with [facility] on 12/11/2019.  He cancelled his appointment with Dr. Urena ([facility] ) last month.    I will not send another paracentesis orders for this week, rather wait until 12/9/2019.  Prior to 12/3/2019, paracentesis was performed last 11/26/2019.

## 2019-12-04 NOTE — TELEPHONE ENCOUNTER
"Requested Prescriptions   Pending Prescriptions Disp Refills     potassium chloride ER (K-DUR/KLOR-CON M) 20 MEQ CR tablet [Pharmacy Med Name: POTASSIUM CL 20MEQ ER TABLETS] 60 tablet 0     Sig: TAKE 1 TABLET(20 MEQ) BY MOUTH TWICE DAILY       Potassium Supplements Protocol Passed - 12/4/2019  3:24 PM        Passed - Recent (12 mo) or future (30 days) visit within the authorizing provider's department     Patient has had an office visit with the authorizing provider or a provider within the authorizing providers department within the previous 12 mos or has a future within next 30 days. See \"Patient Info\" tab in inbasket, or \"Choose Columns\" in Meds & Orders section of the refill encounter.              Passed - Medication is active on med list        Passed - Patient is age 18 or older        Passed - Normal serum potassium in past 12 months     Recent Labs   Lab Test 05/13/19  1525   POTASSIUM 4.0                    Last Written Prescription Date:  7/16/19  Last Fill Quantity: 60,  # refills: 1   Last office visit: No previous visit found with prescribing provider:  Frank Childs     Future Office Visit:      "

## 2019-12-05 RX ORDER — POTASSIUM CHLORIDE 1500 MG/1
TABLET, EXTENDED RELEASE ORAL
Qty: 60 TABLET | Refills: 0 | Status: SHIPPED | OUTPATIENT
Start: 2019-12-05

## 2019-12-05 NOTE — PROGRESS NOTES
Clinic Care Coordination Contact  Care Team Conversations    Late Entry:  RN CC spoke with Dr. Childs yesterday who informed writer he would place 1 additional order for a paracentesis, but not until 12/9/19 due to patient receiving a paracentesis 12/3/19.  Patient will only receive 1 more paracentesis order until he establishes with hepatology, scheduled for 12/10/19.  RN ANAHI attempted to contact patient 12/4/19, however, there was no answer.  Patient contacted writer this morning and writer informed patient of the above information.  Patient then contacted Dr. Rudolph's office at [facility] for sooner orders.  RN CC received a phone call from Beckie with [facility] that Dr. Rudolph was going to sign 1 more and 1 more order only until patient establishes with hepatology.    Melissa Behl BSN, RN, PHN, CCM  Primary Care Clinical RN Care Coordinator  [facility]   [phone]

## 2019-12-05 NOTE — TELEPHONE ENCOUNTER
Prescription approved per [facility] Refill Protocol.    Neris Friend RN

## 2019-12-06 ENCOUNTER — MEDICAL CORRESPONDENCE (OUTPATIENT)
Dept: HEALTH INFORMATION MANAGEMENT | Facility: CLINIC | Age: 54
End: 2019-12-06

## 2019-12-06 ENCOUNTER — TELEPHONE (OUTPATIENT)
Dept: FAMILY MEDICINE | Facility: CLINIC | Age: 54
End: 2019-12-06

## 2019-12-06 NOTE — TELEPHONE ENCOUNTER
Patient calling to [facility] has not received paracentesis orders for him to have done on 12/9/19.  Patient inquiring status of orders, as he wishes to schedule ASAP.  See 12/4/19 telephone encounter.    Please advise.  Patient would like to be contacted as soon as orders as been faxed.    Melissa Behl BSN, RN, PHN, CCM  Primary Care Clinical RN Care Coordinator  [facility]   [phone]

## 2019-12-06 NOTE — TELEPHONE ENCOUNTER
Routing to the provider to advise.  Samira Johnson MA  [facility]  2nd Floor  Primary Care

## 2019-12-09 NOTE — TELEPHONE ENCOUNTER
Order will be faxed today before 3p.  Papito Stahl,  For 1st Floor Primary Care ([facility])

## 2019-12-10 ENCOUNTER — TRANSFERRED RECORDS (OUTPATIENT)
Dept: HEALTH INFORMATION MANAGEMENT | Facility: CLINIC | Age: 54
End: 2019-12-10

## 2019-12-12 ENCOUNTER — PATIENT OUTREACH (OUTPATIENT)
Dept: CARE COORDINATION | Facility: CLINIC | Age: 54
End: 2019-12-12

## 2019-12-12 NOTE — PROGRESS NOTES
Clinic Care Coordination Contact  [facility]/Voicemail       Clinical Data: Care Coordinator Outreach  Outreach attempted x 1.  Left message on patient's voicemail with call back information and requested return call.  Plan:Care Coordinator will try to reach patient again in approximately 10 business days.    Melissa Behl BSN, RN, PHN, [facility]  Primary Care Clinical RN Care Coordinator  [facility]   [phone]

## 2019-12-24 ENCOUNTER — PATIENT OUTREACH (OUTPATIENT)
Dept: CARE COORDINATION | Facility: CLINIC | Age: 54
End: 2019-12-24

## 2019-12-24 ENCOUNTER — TRANSFERRED RECORDS (OUTPATIENT)
Dept: HEALTH INFORMATION MANAGEMENT | Facility: CLINIC | Age: 54
End: 2019-12-24

## 2019-12-24 NOTE — PROGRESS NOTES
Clinic Care Coordination Contact  [facility]/Voicemail       Clinical Data: Care Coordinator Outreach  Outreach attempted x 2.  Left message on patient's voicemail with call back information and requested return call.  Plan: Care Coordinator will send unable to contact letter with care coordinator contact information via mail. Care Coordinator will try to reach patient again in 1 month.    Melissa Behl BSN, RN, PHN, CCM  Primary Care Clinical RN Care Coordinator  [facility]   [phone]

## 2019-12-24 NOTE — LETTER
[facility] CARE COORDINATION  [address] [address]    December 24, 2019    Marlon Mackey  [address]  [place] [address]      Dear Trino,    I have been attempting to reach you since our last contact. I would like to continue to work with you and provide any additional support you may need on achieving your health care related goals. Please call me at [phone] to let me know if you would like to continue working together. I know that there are many things that can affect our ability to communicate and I hope we can continue to work together.    All of us at the [facility] are invested in your health and are here to assist you in meeting your goals.     Sincerely,    Melissa Behl BSN, RN, PHN, [facility]  Primary Care Clinical RN Care Coordinator  [facility]   [phone]

## 2020-01-02 ENCOUNTER — TRANSFERRED RECORDS (OUTPATIENT)
Dept: HEALTH INFORMATION MANAGEMENT | Facility: CLINIC | Age: 55
End: 2020-01-02

## 2020-01-06 ENCOUNTER — OFFICE VISIT (OUTPATIENT)
Dept: FAMILY MEDICINE | Facility: CLINIC | Age: 55
End: 2020-01-06
Payer: COMMERCIAL

## 2020-01-06 VITALS
HEIGHT: 71 IN | BODY MASS INDEX: 25.06 KG/M2 | HEART RATE: 85 BPM | SYSTOLIC BLOOD PRESSURE: 105 MMHG | DIASTOLIC BLOOD PRESSURE: 70 MMHG | OXYGEN SATURATION: 93 % | TEMPERATURE: 97.6 F | WEIGHT: 179 LBS | RESPIRATION RATE: 18 BRPM

## 2020-01-06 DIAGNOSIS — Z72.0 TOBACCO ABUSE: ICD-10-CM

## 2020-01-06 DIAGNOSIS — K42.9 UMBILICAL HERNIA WITHOUT OBSTRUCTION AND WITHOUT GANGRENE: ICD-10-CM

## 2020-01-06 DIAGNOSIS — F32.2 CURRENT SEVERE EPISODE OF MAJOR DEPRESSIVE DISORDER WITHOUT PSYCHOTIC FEATURES, UNSPECIFIED WHETHER RECURRENT (H): ICD-10-CM

## 2020-01-06 DIAGNOSIS — G62.1 ALCOHOLIC POLYNEUROPATHY (H): ICD-10-CM

## 2020-01-06 DIAGNOSIS — I42.6 CARDIOMYOPATHY, ALCOHOLIC (H): ICD-10-CM

## 2020-01-06 DIAGNOSIS — I50.22 CHRONIC SYSTOLIC CONGESTIVE HEART FAILURE (H): ICD-10-CM

## 2020-01-06 DIAGNOSIS — K70.31 ALCOHOLIC CIRRHOSIS OF LIVER WITH ASCITES (H): ICD-10-CM

## 2020-01-06 DIAGNOSIS — Z01.818 PREOP GENERAL PHYSICAL EXAM: Primary | ICD-10-CM

## 2020-01-06 LAB
APTT PPP: 33 SEC (ref 22–37)
INR PPP: 1.03 (ref 0.86–1.14)

## 2020-01-06 PROCEDURE — 93000 ELECTROCARDIOGRAM COMPLETE: CPT | Performed by: NURSE PRACTITIONER

## 2020-01-06 PROCEDURE — 85610 PROTHROMBIN TIME: CPT | Performed by: NURSE PRACTITIONER

## 2020-01-06 PROCEDURE — 99215 OFFICE O/P EST HI 40 MIN: CPT | Performed by: NURSE PRACTITIONER

## 2020-01-06 PROCEDURE — 36415 COLL VENOUS BLD VENIPUNCTURE: CPT | Performed by: NURSE PRACTITIONER

## 2020-01-06 PROCEDURE — 85730 THROMBOPLASTIN TIME PARTIAL: CPT | Performed by: NURSE PRACTITIONER

## 2020-01-06 ASSESSMENT — PAIN SCALES - GENERAL: PAINLEVEL: NO PAIN (0)

## 2020-01-06 ASSESSMENT — MIFFLIN-ST. JEOR: SCORE: 1669.07

## 2020-01-06 NOTE — PROGRESS NOTES
"Faxed Pre-op notes and Ekg, INR and PTT \"in process\" to [facility], [phone], right fax confirmed at 12:28 pm today, 1/6/2020. Will fax labs when final.  Samira Johnson [facility]  2nd Floor  Primary Care    "

## 2020-01-06 NOTE — PATIENT INSTRUCTIONS
Before Your Surgery      Call your surgeon if there is any change in your health. This includes signs of a cold or flu (such as a sore throat, runny nose, cough, rash or fever).    Do not smoke, drink alcohol or take over the counter medicine (unless your surgeon or primary care doctor tells you to) for the 24 hours before and after surgery.    If you take prescribed drugs: Follow your doctor s orders about which medicines to take and which to stop until after surgery.    Eating and drinking prior to surgery: follow the instructions from your surgeon    Take a shower or bath the night before surgery. Use the soap your surgeon gave you to gently clean your skin. If you do not have soap from your surgeon, use your regular soap. Do not shave or scrub the surgery site.  Wear clean pajamas and have clean sheets on your bed.     At [facility], we strive to deliver an exceptional experience to you, every time we see you.  If you receive a survey in the mail, please send us back your thoughts. We really do value your feedback.    Based on your medical history, these are the current health maintenance/preventive care services that you are due for (some may have been done at this visit.)  Health Maintenance Due   Topic Date Due     PREVENTIVE CARE VISIT  1965     HF ACTION PLAN  1965     ADVANCE CARE PLANNING  1965     DEPRESSION ACTION PLAN  1965     COLONOSCOPY  09/05/1974     PNEUMOCOCCAL IMMUNIZATION 19-64 MEDIUM RISK (1 of 1 - PPSV23) 09/05/1983     ZOSTER IMMUNIZATION (1 of 2) 09/05/2014     LIPID  06/07/2019     INFLUENZA VACCINE (1) 09/01/2019     PHQ-9  11/06/2019     BMP  11/13/2019         Suggested websites for health information:  Www.[employer].org : Up to date and easily searchable information on multiple topics.  Www.medlineplus.gov : medication info, interactive tutorials, watch real surgeries online  Www.familydoctor.org : good info from the Academy of Family  Physicians  Www.cdc.gov : public health info, travel advisories, epidemics (H1N1)  Www.aap.org : children's health info, normal development, vaccinations  Www.health.[facility].[place].us : [place] dept of health, public health issues in [place], N1N1    Your care team:                            Family Medicine Internal Medicine   MD Frank Barkley MD Shantel Branch-Fleming, MD Katya Georgiev PA-C Nam Ho, MD Pediatrics   CHRISTY Aranda, JEFF Combs APRN MD Hyacinth Polanco MD Deborah Mielke, MD Kim Thein, APRN CNP      Clinic hours: Monday - Thursday 7 am-7 pm; Fridays 7 am-5 pm.   Urgent care: Monday - Friday 11 am-9 pm; Saturday and Sunday 9 am-5 pm.  Pharmacy : Monday -Thursday 8 am-8 pm; Friday 8 am-6 pm; Saturday and Sunday 9 am-5 pm.     Clinic: [phone]   Pharmacy: [phone]    Patient Education     Presurgery Checklist  You are scheduled to have surgery. The healthcare staff will try to make your stay comfortable. Use the guidelines below to remind yourself what to do before surgery. Be sure to follow any specific pre-op instructions from your surgeon or nurse.  Preparing for surgery    If you are having abdominal surgery, ask what you need to do to clear your bowel.    Tell your surgeon if you have allergies to any medicines, latex, or foods.    Ask your surgeon if you might need a blood transfusion during surgery and if so, how to prepare for it. In some cases, you can donate blood before surgery. If needed, this blood can be given back (transfused) to you during or after surgery.    Arrange for an adult family member or friend to drive you home after surgery. If possible, have someone ready to help you at home as you recover.    Call the surgeon if you get a cold, fever, sore throat, diarrhea, or other health problem just before surgery. Your surgeon can decide whether or not to postpone the surgery.  Medicines    Tell your surgeon about  all medicines you take, including prescription and over-the-counter products such as herbal remedies and vitamins. Ask if you should continue taking them.    If you take ibuprofen, naproxen, or blood thinners (anticoagulants) such as aspirin, clopidogrel, or warfarin, ask your surgeon whether you should stop taking them and how long before surgery you should stop.    You may be told to take antibiotics just before surgery to prevent infection. If so, follow instructions carefully on how to take them.    If you are told to take blood thinners to help prevent blood clots after surgery, be sure to follow the instructions on how to take them.  Stop smoking  If you smoke, healing may take longer. So at least 2 week(s) before surgery, stop smoking.  Bathing or showering before surgery    If instructed, wash with antibacterial soap. Afterward, do not use lotions, oils, or powders.    If you are having surgery on the head, you may be asked to shampoo with antibacterial soap. Follow instructions for doing so.  Do not remove hair from the surgery site  Do not shave hair from the incision site, unless you are given specific instructions to do so. Usually, if hair needs to be removed, it will be done at the hospital right before surgery.  Don t eat or drink    Follow any directions you are given for not eating or drinking before surgery. If you don't follow instructions about when to stop eating and drinking, your procedure may be postponed or rescheduled for another day. This is a safety issue.    You can brush your teeth and rinse your mouth, but don t swallow any water.  Day of surgery    Don't wear makeup. Don't use perfume, deodorant, or hairspray. Remove nail polish and artificial nails.    Leave jewelry (including rings), watches, and other valuables at home.    Be sure to bring health insurance cards or forms and a photo ID.    Bring a list of your medicines (include the name, dose, how often you take them, and the time  last dose was taken).    Arrive on time at the hospital or surgery facility.  Date Last Reviewed: 12/1/2016 2000-2018 [employer]. [address] [place] [address], [place] [address]. All rights reserved. This information is not intended as a substitute for professional medical care. Always follow your healthcare professional's instructions.

## 2020-01-06 NOTE — Clinical Note
Fax note/EKG to [facility]. Ct scan today at 1pmKimberly R Kitty PABLO, CNP
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
## 2020-01-07 NOTE — PROGRESS NOTES
Faxed lab results to [facility], [phone], right fax confirmed at 8:50 am today, 1/7/2020.  Samira Johnson [facility]  2nd Floor  Primary Care

## 2020-01-08 ENCOUNTER — TELEPHONE (OUTPATIENT)
Dept: FAMILY MEDICINE | Facility: CLINIC | Age: 55
End: 2020-01-08

## 2020-01-08 NOTE — TELEPHONE ENCOUNTER
Left voicemail message to return our call.  Samira Johnson MA  [facility]  2nd Floor  Primary Care

## 2020-01-08 NOTE — TELEPHONE ENCOUNTER
Called and left a voicemail message that this has been faxed to [facility] and to return our call with the hospital location and fax #.  Samira Johnson MA  [facility]  2nd Floor  Primary Care

## 2020-01-08 NOTE — TELEPHONE ENCOUNTER
Reason for Call:  Other     Detailed comments: patient asking if the paperwork for the pre-op was sent to [facility]? Patient was seen 1/6/2020. [facility] told patient that they did not receive the information.    Phone Number Patient can be reached at: Cell number on file:    Telephone Information:   Mobile [phone]       Best Time: as soon as possible    Can we leave a detailed message on this number? YES    Call taken on 1/8/2020 at 8:38 AM by Saloni Posey

## 2020-01-09 NOTE — TELEPHONE ENCOUNTER
Called and spoke to the patient and he did confirm that this was to go to [facility]. I explained that the notes and the labs were faxed separately to them as the lab results were pending. Patient understands.  Samira Johnson MA  [facility]  2nd Floor  Primary Care

## 2020-01-28 ENCOUNTER — PATIENT OUTREACH (OUTPATIENT)
Dept: CARE COORDINATION | Facility: CLINIC | Age: 55
End: 2020-01-28

## 2020-01-28 NOTE — LETTER
[facility] CARE COORDINATION  [address] [address]    January 28, 2020    Marlon Mackey  [address]  [facility] [address]      Dear Marlon,    I have been unsuccessful in reaching you since our last contact. At this time I will make no further attempts to reach you, however this does not change your ability to continue receiving care from your providers at [place]. If you are needing additional support from a care coordinator in the future please contact me at [phone].    All of us at [facility] are invested in your health and are here to assist you in meeting your goals.    Sincerely,    Melissa Behl BSN, RN, PHN, [facility]  Primary Care Clinical RN Care Coordinator  [facility]   [phone]

## 2020-01-28 NOTE — PROGRESS NOTES
Clinic Care Coordination Contact  [facility]/Voicemail       Clinical Data: Care Coordinator Outreach  Outreach attempted x 3.  Left message on patient's voicemail with call back information and requested return call.  Plan: Care Coordinator will send disenrollment letter with care coordinator contact information via mail. Care Coordinator will do no further outreaches at this time.    Melissa Behl BSN, RN, PHN, [facility]  Primary Care Clinical RN Care Coordinator  [facility]   [phone]

## 2020-02-05 ENCOUNTER — PATIENT OUTREACH (OUTPATIENT)
Dept: CARE COORDINATION | Facility: CLINIC | Age: 55
End: 2020-02-05

## 2020-02-05 DIAGNOSIS — K70.31 ALCOHOLIC CIRRHOSIS OF LIVER WITH ASCITES (H): Primary | ICD-10-CM

## 2020-02-05 SDOH — HEALTH STABILITY: MENTAL HEALTH: HOW MANY STANDARD DRINKS CONTAINING ALCOHOL DO YOU HAVE ON A TYPICAL DAY?: 1 OR 2

## 2020-02-05 SDOH — ECONOMIC STABILITY: FOOD INSECURITY: WITHIN THE PAST 12 MONTHS, YOU WORRIED THAT YOUR FOOD WOULD RUN OUT BEFORE YOU GOT MONEY TO BUY MORE.: NEVER TRUE

## 2020-02-05 SDOH — HEALTH STABILITY: PHYSICAL HEALTH: ON AVERAGE, HOW MANY DAYS PER WEEK DO YOU ENGAGE IN MODERATE TO STRENUOUS EXERCISE (LIKE A BRISK WALK)?: 0 DAYS

## 2020-02-05 SDOH — ECONOMIC STABILITY: FOOD INSECURITY: WITHIN THE PAST 12 MONTHS, THE FOOD YOU BOUGHT JUST DIDN'T LAST AND YOU DIDN'T HAVE MONEY TO GET MORE.: NEVER TRUE

## 2020-02-05 SDOH — HEALTH STABILITY: MENTAL HEALTH: HOW OFTEN DO YOU HAVE A DRINK CONTAINING ALCOHOL?: 2-4 TIMES A MONTH

## 2020-02-05 SDOH — ECONOMIC STABILITY: INCOME INSECURITY: HOW HARD IS IT FOR YOU TO PAY FOR THE VERY BASICS LIKE FOOD, HOUSING, MEDICAL CARE, AND HEATING?: NOT VERY HARD

## 2020-02-05 SDOH — ECONOMIC STABILITY: TRANSPORTATION INSECURITY
IN THE PAST 12 MONTHS, HAS LACK OF TRANSPORTATION KEPT YOU FROM MEETINGS, WORK, OR FROM GETTING THINGS NEEDED FOR DAILY LIVING?: NO

## 2020-02-05 SDOH — HEALTH STABILITY: MENTAL HEALTH: HOW OFTEN DO YOU HAVE 6 OR MORE DRINKS ON ONE OCCASION?: LESS THAN MONTHLY

## 2020-02-05 SDOH — HEALTH STABILITY: PHYSICAL HEALTH: ON AVERAGE, HOW MANY MINUTES DO YOU ENGAGE IN EXERCISE AT THIS LEVEL?: 0 MIN

## 2020-02-05 SDOH — ECONOMIC STABILITY: TRANSPORTATION INSECURITY
IN THE PAST 12 MONTHS, HAS THE LACK OF TRANSPORTATION KEPT YOU FROM MEDICAL APPOINTMENTS OR FROM GETTING MEDICATIONS?: NO

## 2020-02-05 ASSESSMENT — ACTIVITIES OF DAILY LIVING (ADL): DEPENDENT_IADLS:: TRANSPORTATION;CLEANING;LAUNDRY

## 2020-02-05 NOTE — PROGRESS NOTES
Clinic Care Coordination Contact    Clinic Care Coordination Contact  OUTREACH    Referral Information:  Referral Source: PCP    Primary Diagnosis: GI Disorders    Chief Complaint   Patient presents with     Clinic Care Coordination - Initial     RN        Universal Utilization: Patient uses [facility] and is followed by gastroenterology at [facility].   Clinic Utilization  Difficulty keeping appointments:: Yes  Compliance Concerns: Yes  No-Show Concerns: Yes  No PCP office visit in Past Year: No  Utilization    Last refreshed: 2/5/2020  1:36 PM:  Hospital Admissions 0           Last refreshed: 2/5/2020  1:36 PM:  ED Visits 0           Last refreshed: 2/5/2020  1:36 PM:  No Show Count (past year) 6              Current as of: 2/5/2020  1:36 PM              Clinical Concerns:  Current Medical Concerns:  RN CC contacted writer requesting assistance in obtaining additional paracentesis orders and a referral to have an umbilical hernia repair with mesh.    Patient was seen by  [facility] 12/24/19 and has a scheduled TIPS procedure 2/19/20 at [facility].  Patient has been receiving paracentesis at [facility] approximately every 1-2 weeks.  Patient states he used his last paracentesis order yesterday and is concerned that he will need another paracentesis prior to his TIPS procedure on 2/19/20.  Patient also requests a referral to a [facility] surgeon to perform his umbilical hernia repair with mesh.  Patient declines to go to the  of  as referred by [facility] surgery.  RN CC contacted [facility] to request additional paracentesis orders and a surgical referral for patient's umbilical hernia.  Patient reports feeling overwhelmed due to the upcoming procedure and verbalized appreciation of RN CC follow up.  RN CC noted patient had last pre-op 1/6/20.  Patient will check with surgeon if an additional pre-op is needed prior to 2/19/20.  Patient Active Problem List   Diagnosis     CARDIOVASCULAR SCREENING; LDL GOAL  "LESS THAN 130     Overweight (BMI 25.0-29.9)     Tobacco abuse     Cervical radiculopathy     Cardiomyopathy, alcoholic (H)     Tinnitus, bilateral     Chronic systolic congestive heart failure (H)     Alcoholic cirrhosis of liver with ascites (H)     Alcohol abuse     Diarrhea, unspecified type     ROSIBEL (acute kidney injury) (H)     Current severe episode of major depressive disorder without psychotic features (H)     Ulcer of esophagus without bleeding     Duodenal ulcer without hemorrhage or perforation and without obstruction     Alcoholic polyneuropathy (H)      Current Behavioral Concerns: Patient reports significant decrease in alcohol use stating he only drinks a few times a month and only 1-2 drinks per occasion.      Education Provided to patient: RN CC reviewed care coordination services, as patient was previously enrolled with care coordination.  RN CC educated patient on importance of keeping appointments, as per 1/6/20 pre-op appointment his previous TIPS procedure was scheduled for 1/7/20.    Pain  Pain (GOAL):: Yes  Type: Chronic (>3mo)  Location of chronic pain:: abdomen   Radiating: No  Progression: Unchanged  Description of pain: Aching  Chronic pain severity:: (\"It's ok today, I just had a procedure yesterday\")  Limitation of routine activities due to chronic pain:: Yes  Description: Able to do light housework  Alleviating Factors: Rest  Aggravating Factors: Activity  Health Maintenance Reviewed: Due/Overdue   Health Maintenance Due   Topic Date Due     PREVENTIVE CARE VISIT  1965     HF ACTION PLAN  1965     ADVANCE CARE PLANNING  1965     DEPRESSION ACTION PLAN  1965     COLONOSCOPY  09/05/1974     PNEUMOCOCCAL IMMUNIZATION 19-64 MEDIUM RISK (1 of 1 - PPSV23) 09/05/1983     ZOSTER IMMUNIZATION (1 of 2) 09/05/2014     LIPID  06/07/2019     INFLUENZA VACCINE (1) 09/01/2019     PHQ-9  11/06/2019     BMP  11/13/2019      Clinical Pathway: None    Medication " Management:  Patient reports compliance with current medication regimen.     Functional Status:  Dependent ADLs:: Wheelchair-independent  Dependent IADLs:: Transportation, Cleaning, Laundry  Bed or wheelchair confined:: Yes  Mobility Status: Independent w/Device    Living Situation:  Current living arrangement:: I live alone  Type of residence:: Town home    Lifestyle & Psychosocial Needs:  Lifestyle     Physical activity:     Days per week: 0 days     Minutes per session: 0 min     Stress: Not on file     Social Needs     Financial resource strain: Not very hard     Food insecurity:     Worry: Never true     Inability: Never true     Transportation needs:     Medical: No     Non-medical: No     Diet:: No added salt, Low saturated fat  Inadequate nutrition (GOAL):: No  Tube Feeding: No  Inadequate activity/exercise (GOAL):: No  Significant changes in sleep pattern (GOAL): No  Medical transport, Public transportation, Family, Friend     Episcopal or spiritual beliefs that impact treatment:: No  Mental health DX:: No  Mental health management concern (GOAL):: No  Informal Support system:: Parent, Family, Friends, Children   Socioeconomic History     Marital status: Single     Spouse name: Not on file     Number of children: Not on file     Years of education: Not on file     Highest education level: Not on file     Tobacco Use     Smoking status: Current Every Day Smoker     Packs/day: 1.00     Smokeless tobacco: Never Used     Tobacco comment: it relaxes him when he is trying to sleep   Substance and Sexual Activity     Alcohol use: Yes     Frequency: 2-4 times a month     Drinks per session: 1 or 2     Binge frequency: Less than monthly     Comment: helps him sleep     Drug use: No     Sexual activity: Yes     Partners: Female        Resources and Interventions:  Current Resources:      Community Resources: PCA(Pt's daughter is his PCA)  Supplies used at home:: None  Equipment Currently Used at Home: wheelchair,  power    Advance Care Plan/Directive  Advanced Care Plans/Directives on file:: No  Advanced Care Plan/Directive Status: Considering Options  Patient would like to complete a health care directive.  Form and Advance Care Planning guide mailed to patient.    Referrals Placed: None     Goals:   Goals        General    1. Improve chronic symptoms (pt-stated)     Notes - Note created  2/5/2020  2:03 PM by Behl, Melissa K, RN    Goal Statement: I want to understand my liver disease.  Date Goal set: 2/5/2020   Barriers: complex diagnosis  Strengths: care coordination  Date to Achieve By: 5/5/20  Patient expressed understanding of goal: yes  Action steps to achieve this goal:  1. I will have my abdominal scan 2/12/20  2. I will have my TIPS procedure 2/19/20  3. I will have paracentesis as needed until my TIPS procedure (RN CC will contact GI office)         2. Medical (pt-stated)     Notes - Note created  2/5/2020  2:05 PM by Behl, Melissa K, RN    Goal Statement: I want to have umbilical hernia surgery.  Date Goal set: 2/5/2020   Barriers: ascites, undergoing paracentesis, future TIPS  Strengths: care coordination  Date to Achieve By: 5/5/20  Patient expressed understanding of goal: yes  Action steps to achieve this goal:  1. I will ask my GI and/or PCP for a surgery referral                 Patient/Caregiver understanding: Patient verbalizes understanding of care of plan.    Outreach Frequency: 2 weeks      Plan:   1. Patient will have abdominal CT 2/12/20 as scheduled.  2. Patient will have TIPS procedure 2/19/20 as scheduled.  3. Patient will contact surgeon to determine if another pre-op is needed.  4. RN CC will mail Health Care Directive and Advance Care Planning guide to patient with updated Complex Care Plan.  5. RN CC left a message with [facility] regarding patient's request for additional paracentesis orders and referral to [facility] surgeon for umbilical hernia repair.  6. RN CC will follow up with patient in 3  weeks following surgery.    Melissa Behl BSN, RN, PHN, CCM  Primary Care Clinical RN Care Coordinator  [facility]   [phone]

## 2020-02-05 NOTE — LETTER
[facility]  Complex Care Plan  About Me:    Patient Name:  Marlon Mackey    YOB: 1965  Age:         54 year old   Derek MRN:    [id number] Telephone Information:  Home Phone [phone]   Mobile [phone]       Address:  [address]  [place] [facility] [address] Email address:  No e-mail address on record      Emergency Contact(s)    Name Relationship Lgl Grd Work Phone Home Phone Mobile Phone   1. ESVIN MACKEY Mother   [phone]    2. DECLINE, PER PT Other   None            Primary language:  English     needed? No   [facility] [employer] [facility]:  [phone] op. 1  Other communication barriers: Physical impairment  Preferred Method of Communication:  Mail  Current living arrangement: I live alone  Mobility Status/ Medical Equipment: Independent w/Device    Health Maintenance  Health Maintenance Reviewed: Due/Overdue   Health Maintenance Due   Topic Date Due     PREVENTIVE CARE VISIT  1965     HF ACTION PLAN  1965     ADVANCE CARE PLANNING  1965     DEPRESSION ACTION PLAN  1964     COLONOSCOPY  09/05/1974     PNEUMOCOCCAL IMMUNIZATION 19-64 MEDIUM RISK (1 of 1 - PPSV23) 09/05/1983     ZOSTER IMMUNIZATION (1 of 2) 09/05/2014     LIPID  06/07/2019     INFLUENZA VACCINE (1) 09/01/2019     PHQ-9  11/06/2019     BMP  11/13/2019        My Access Plan  Medical Emergency 911   Primary Clinic Line [facility] - [phone]   24 Hour Appointment Line [phone] or  [phone]) (toll-free)   24 Hour Nurse Line [phone] (toll-free)   Preferred Urgent Care [facility], [phone]   [facility]  [phone]   Preferred Pharmacy [facility] [place], [place] - [address]     Behavioral Health Crisis Line The National Suicide Prevention Lifeline at 1-108.565.6384 or 911             My Care Team  Members  Patient Care Team       Relationship Specialty Notifications Start End    Frank Childs MD PCP - General Internal Medicine  10/14/15     Phone: [phone] Fax: [phone]         [address] [facility] [address]    Frank Childs MD Assigned PCP   4/1/18     Phone: [phone] Fax: [phone]         [address] [facility] [address]    Robert Rudolph MD Physician Gastroenterology  7/10/19     Phone: [phone] Fax: [phone]         [facility] [address] [address] [facility] [address]    Behl, Melissa K, RN Lead Care Coordinator Primary Care - CC Admissions 2/5/20     Phone: [phone] Fax: [phone]        Maximiliano Jean Baptiste MD MD Gastroenterology  2/5/20     Phone: [phone] Fax: [phone]         [facility] [address] [facility] [address]            My Care Plans  Self Management and Treatment Plan  Goals and (Comments)  Goals        General    1. Improve chronic symptoms (pt-stated)     Notes - Note created  2/5/2020  2:03 PM by Behl, Melissa K, RN    Goal Statement: I want to understand my liver disease.  Date Goal set: 2/5/2020   Barriers: complex diagnosis  Strengths: care coordination  Date to Achieve By: 5/5/20  Patient expressed understanding of goal: yes  Action steps to achieve this goal:  1. I will have my abdominal scan 2/12/20  2. I will have my TIPS procedure 2/19/20  3. I will have paracentesis as needed until my TIPS procedure (RN CC will contact GI office)         2. Medical (pt-stated)     Notes - Note created  2/5/2020  2:05 PM by Behl, Melissa K, RN    Goal Statement: I want to have umbilical hernia surgery.  Date Goal set: 2/5/2020   Barriers: ascites, undergoing paracentesis, future TIPS  Strengths: care coordination  Date to Achieve By: 5/5/20  Patient expressed understanding of goal: yes  Action steps to achieve this goal:  1. I will ask my GI and/or PCP for a surgery referral                   Action Plans on File:                       Advance Care Plans/Directives Type:        My Medical and Care Information  Problem List   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)     Ulcer of esophagus without bleeding     Duodenal ulcer without hemorrhage or perforation and without obstruction     Alcoholic polyneuropathy (H)      Current Medications and Allergies:  See printed Medication Report.    Care Coordination Start Date: 2/5/2020   Frequency of Care Coordination: 2 weeks   Form Last Updated: 02/05/2020

## 2020-02-10 ENCOUNTER — PATIENT OUTREACH (OUTPATIENT)
Dept: CARE COORDINATION | Facility: CLINIC | Age: 55
End: 2020-02-10

## 2020-02-10 NOTE — PROGRESS NOTES
Clinic Care Coordination Contact    Follow Up Progress Note      Assessment: Patient contacted writer to state he had not heard back from [facility] regarding his requests for additional paracentesis orders and a surgery referral.  Patient reports his abdomen is becoming more bloated.  RN CC contacted [facility] and was informed Dr. Jean Baptiste had left a message for patient 2/6/20 requesting a call back, but no additional paracentesis orders or surgery orders had been placed.  RN CC relayed this information to patient and provided patient with the contact information to [facility].  Patient will need to schedule a pre-op prior to his TIPS procedure on 2/19/20.  Patient made a goal to schedule this appointment, but declined assistance from writer.    Goals addressed this encounter:   Goals Addressed                 This Visit's Progress      1. Improve chronic symptoms (pt-stated)   10%     Goal Statement: I want to understand my liver disease.  Date Goal set: 2/5/2020   Barriers: complex diagnosis  Strengths: care coordination  Date to Achieve By: 5/5/20  Patient expressed understanding of goal: yes  Action steps to achieve this goal:  1. I will have my abdominal scan 2/12/20  2. I will have my TIPS procedure 2/19/20  3. I will have paracentesis as needed until my TIPS procedure (RN CC will contact GI office)  4. I will schedule a pre-op exam           2. Medical (pt-stated)   Not on track     Goal Statement: I want to have umbilical hernia surgery.  Date Goal set: 2/5/2020   Barriers: ascites, undergoing paracentesis, future TIPS  Strengths: care coordination  Date to Achieve By: 5/5/20  Patient expressed understanding of goal: yes  Action steps to achieve this goal:  1. I will ask my GI and/or PCP for a surgery referral                  Intervention/Education provided during outreach: RN CC assisted patient in contacting gastroenterology office to determine outcome of request for paracentesis and surgery referral.  Reviewed plan of  care.     Outreach Frequency: 2 weeks    Plan:   1. Patient will return call to [facility] provider Dr. Jean Baptiste.  2. Patient will schedule pre-op appointment.  3. Patient will complete abdominal CT 2/12/20 as scheduled.  4. Patient will have TIPS procedure as scheduled 2/19/20.  5. RN CC will follow up with patient in 2 weeks.    Melissa Behl BSN, RN, PHN, [facility]  Primary Care Clinical RN Care Coordinator  [facility]   [phone]

## 2020-02-14 ENCOUNTER — PATIENT OUTREACH (OUTPATIENT)
Dept: CARE COORDINATION | Facility: CLINIC | Age: 55
End: 2020-02-14

## 2020-02-14 ENCOUNTER — OFFICE VISIT (OUTPATIENT)
Dept: FAMILY MEDICINE | Facility: CLINIC | Age: 55
End: 2020-02-14
Payer: COMMERCIAL

## 2020-02-14 VITALS
WEIGHT: 191.2 LBS | HEART RATE: 89 BPM | BODY MASS INDEX: 26.77 KG/M2 | SYSTOLIC BLOOD PRESSURE: 122 MMHG | OXYGEN SATURATION: 100 % | HEIGHT: 71 IN | RESPIRATION RATE: 16 BRPM | TEMPERATURE: 98.1 F | DIASTOLIC BLOOD PRESSURE: 79 MMHG

## 2020-02-14 DIAGNOSIS — I50.22 CHRONIC SYSTOLIC CONGESTIVE HEART FAILURE (H): ICD-10-CM

## 2020-02-14 DIAGNOSIS — Z72.0 TOBACCO ABUSE: ICD-10-CM

## 2020-02-14 DIAGNOSIS — G62.1 ALCOHOLIC POLYNEUROPATHY (H): ICD-10-CM

## 2020-02-14 DIAGNOSIS — K70.31 ASCITES DUE TO ALCOHOLIC CIRRHOSIS (H): ICD-10-CM

## 2020-02-14 DIAGNOSIS — Z01.818 PREOP GENERAL PHYSICAL EXAM: Primary | ICD-10-CM

## 2020-02-14 DIAGNOSIS — I42.6 CARDIOMYOPATHY, ALCOHOLIC (H): ICD-10-CM

## 2020-02-14 DIAGNOSIS — K42.9 UMBILICAL HERNIA WITHOUT OBSTRUCTION AND WITHOUT GANGRENE: ICD-10-CM

## 2020-02-14 LAB
BASOPHILS # BLD AUTO: 0.1 10E9/L (ref 0–0.2)
BASOPHILS NFR BLD AUTO: 0.9 %
DIFFERENTIAL METHOD BLD: ABNORMAL
EOSINOPHIL # BLD AUTO: 0.2 10E9/L (ref 0–0.7)
EOSINOPHIL NFR BLD AUTO: 1.7 %
ERYTHROCYTE [DISTWIDTH] IN BLOOD BY AUTOMATED COUNT: 14.4 % (ref 10–15)
HCT VFR BLD AUTO: 34 % (ref 40–53)
HGB BLD-MCNC: 11.5 G/DL (ref 13.3–17.7)
INR PPP: 1.06 (ref 0.86–1.14)
LYMPHOCYTES # BLD AUTO: 1.3 10E9/L (ref 0.8–5.3)
LYMPHOCYTES NFR BLD AUTO: 12 %
MCH RBC QN AUTO: 36.9 PG (ref 26.5–33)
MCHC RBC AUTO-ENTMCNC: 33.8 G/DL (ref 31.5–36.5)
MCV RBC AUTO: 109 FL (ref 78–100)
MONOCYTES # BLD AUTO: 1 10E9/L (ref 0–1.3)
MONOCYTES NFR BLD AUTO: 9.1 %
NEUTROPHILS # BLD AUTO: 8.1 10E9/L (ref 1.6–8.3)
NEUTROPHILS NFR BLD AUTO: 76.3 %
PLATELET # BLD AUTO: 176 10E9/L (ref 150–450)
RBC # BLD AUTO: 3.12 10E12/L (ref 4.4–5.9)
WBC # BLD AUTO: 10.6 10E9/L (ref 4–11)

## 2020-02-14 PROCEDURE — 85025 COMPLETE CBC W/AUTO DIFF WBC: CPT | Performed by: PREVENTIVE MEDICINE

## 2020-02-14 PROCEDURE — 85610 PROTHROMBIN TIME: CPT | Performed by: PREVENTIVE MEDICINE

## 2020-02-14 PROCEDURE — 99215 OFFICE O/P EST HI 40 MIN: CPT | Performed by: PREVENTIVE MEDICINE

## 2020-02-14 PROCEDURE — 36415 COLL VENOUS BLD VENIPUNCTURE: CPT | Performed by: PREVENTIVE MEDICINE

## 2020-02-14 ASSESSMENT — MIFFLIN-ST. JEOR: SCORE: 1724.41

## 2020-02-14 ASSESSMENT — ANXIETY QUESTIONNAIRES
1. FEELING NERVOUS, ANXIOUS, OR ON EDGE: NOT AT ALL
7. FEELING AFRAID AS IF SOMETHING AWFUL MIGHT HAPPEN: NOT AT ALL
5. BEING SO RESTLESS THAT IT IS HARD TO SIT STILL: NOT AT ALL
2. NOT BEING ABLE TO STOP OR CONTROL WORRYING: NOT AT ALL
6. BECOMING EASILY ANNOYED OR IRRITABLE: NOT AT ALL
IF YOU CHECKED OFF ANY PROBLEMS ON THIS QUESTIONNAIRE, HOW DIFFICULT HAVE THESE PROBLEMS MADE IT FOR YOU TO DO YOUR WORK, TAKE CARE OF THINGS AT HOME, OR GET ALONG WITH OTHER PEOPLE: SOMEWHAT DIFFICULT
GAD7 TOTAL SCORE: 1
3. WORRYING TOO MUCH ABOUT DIFFERENT THINGS: NOT AT ALL

## 2020-02-14 ASSESSMENT — PATIENT HEALTH QUESTIONNAIRE - PHQ9
5. POOR APPETITE OR OVEREATING: SEVERAL DAYS
SUM OF ALL RESPONSES TO PHQ QUESTIONS 1-9: 5

## 2020-02-14 ASSESSMENT — PAIN SCALES - GENERAL: PAINLEVEL: EXTREME PAIN (8)

## 2020-02-14 NOTE — Clinical Note
Please fax pre op, procedure is tomorrow. Sorry about the delay! Thank you, Demetra Weinstein MD MPH

## 2020-02-14 NOTE — LETTER
February 17, 2020      Marlon Mackey  [address] [place] [address]          Dear Maroln Mackey,     Labs are stable for you. Pre op clearance will be sent to your surgeon.   Please let me know if you have any questions and thank you for choosing [place] [facility].     Regards,     Demetra Weinstein MD MPH     Resulted Orders   CBC with platelets differential   Result Value Ref Range    WBC 10.6 4.0 - 11.0 10e9/L    RBC Count 3.12 (L) 4.4 - 5.9 10e12/L    Hemoglobin 11.5 (L) 13.3 - 17.7 g/dL    Hematocrit 34.0 (L) 40.0 - 53.0 %     (H) 78 - 100 fl    MCH 36.9 (H) 26.5 - 33.0 pg    MCHC 33.8 31.5 - 36.5 g/dL    RDW 14.4 10.0 - 15.0 %    Platelet Count 176 150 - 450 10e9/L    % Neutrophils 76.3 %    % Lymphocytes 12.0 %    % Monocytes 9.1 %    % Eosinophils 1.7 %    % Basophils 0.9 %    Absolute Neutrophil 8.1 1.6 - 8.3 10e9/L    Absolute Lymphocytes 1.3 0.8 - 5.3 10e9/L    Absolute Monocytes 1.0 0.0 - 1.3 10e9/L    Absolute Eosinophils 0.2 0.0 - 0.7 10e9/L    Absolute Basophils 0.1 0.0 - 0.2 10e9/L    Diff Method Automated Method    INR   Result Value Ref Range    INR 1.06 0.86 - 1.14

## 2020-02-14 NOTE — PROGRESS NOTES
Clinic Care Coordination Contact    Follow Up Progress Note      Assessment: RN CC met with patient in clinic while he was being seen for his pre-op exam.   Patient states he was able to talk with Dr. Jean Baptiste's office and has 2 additional paracentesis orders.  Patient will call for an appointment today, as he reports increase in abdominal bloating.    Patient reports completing his abdominal CT scan 2/12/20 in preparation for his TIPS procedure on 2/19/20.  Patient states he was told he would be in patient for 2-3 days following his surgery.  Patient states Dr. Jean Baptiste would not refer him to surgery for his umbilical hernia.  RN CC advised patient to discuss with PCP today.    Goals addressed this encounter:   Goals Addressed                 This Visit's Progress      1. Improve chronic symptoms (pt-stated)   10%     Goal Statement: I want to understand my liver disease.  Date Goal set: 2/5/2020   Barriers: complex diagnosis  Strengths: care coordination  Date to Achieve By: 5/5/20  Patient expressed understanding of goal: yes  Action steps to achieve this goal:  1. I will have my TIPS procedure 2/19/20  2. I will have paracentesis as needed until my TIPS procedure (RN CC will contact GI office)  3. I will schedule a pre-op exam           2. Medical (pt-stated)   Not on track     Goal Statement: I want to have umbilical hernia surgery.  Date Goal set: 2/5/2020   Barriers: ascites, undergoing paracentesis, future TIPS  Strengths: care coordination  Date to Achieve By: 5/5/20  Patient expressed understanding of goal: yes  Action steps to achieve this goal:  1. I will ask my GI and/or PCP for a surgery referral                  Intervention/Education provided during outreach: RN CC reviewed plan of care and advised patient to discuss surgery referral with primary care.     Outreach Frequency: 2 weeks    Plan:   1. Patient will schedule paracentesis prior to TIPS procedure.  2. Patient will undergo TIPS procedure  2/19/29.  3. RN Care Coordinator will monitor hospital chart following TIPS procedure for patient disposition.    Melissa Behl BSN, RN, PHN, [facility]  Primary Care Clinical RN Care Coordinator  [facility]   [phone]

## 2020-02-14 NOTE — PATIENT INSTRUCTIONS
At [facility], we strive to deliver an exceptional experience to you, every time we see you. If you receive a survey, please complete it as we do value your feedback.  If you have MyChart, you can expect to receive results automatically within 24 hours of their completion.  Your provider will send a note interpreting your results as well.   If you do not have MyChart, you should receive your results in about a week by mail.    Your care team:                            Family Medicine Internal Medicine   MD Frank Barkley MD Shantel Branch-Fleming, MD Katya Georgiev PA-C Megan Hill, APRN CNP    Kaveh Quintanilla, MD Pediatrics   Trino Coy, PAVioletC  Gilma Barnes, MD Gypsy Ulloa APRN CNP   MD Hyacinth Pepper MD Deborah Mielke, MD Kim Thein, APRN CNP      Clinic hours: Monday - Thursday 7 am-7 pm; Fridays 7 am-5 pm.   Urgent care: Monday - Friday 11 am-9 pm; Saturday and Sunday 9 am-5 pm.  Pharmacy : Monday -Thursday 8 am-8 pm; Friday 8 am-6 pm; Saturday and Sunday 9 am-5 pm.     Clinic: [phone]   Pharmacy: [phone]      Before Your Surgery      Call your surgeon if there is any change in your health. This includes signs of a cold or flu (such as a sore throat, runny nose, cough, rash or fever).    Do not smoke, drink alcohol or take over the counter medicine (unless your surgeon or primary care doctor tells you to) for the 24 hours before and after surgery.    If you take prescribed drugs: Follow your doctor s orders about which medicines to take and which to stop until after surgery.    Eating and drinking prior to surgery: follow the instructions from your surgeon    Take a shower or bath the night before surgery. Use the soap your surgeon gave you to gently clean your skin. If you do not have soap from your surgeon, use your regular soap. Do not shave or scrub the surgery site.  Wear clean pajamas and have clean  sheets on your bed.
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
## 2020-02-15 ASSESSMENT — ANXIETY QUESTIONNAIRES: GAD7 TOTAL SCORE: 1

## 2020-02-17 NOTE — RESULT ENCOUNTER NOTE
Please send a letter:    Dear Marlon Mackey,    Labs are stable for you. Pre op clearance will be sent to your surgeon.  Please let me know if you have any questions and thank you for choosing [facility].    Regards,    Demetra Weinstein MD MPH

## 2020-02-18 ENCOUNTER — TELEPHONE (OUTPATIENT)
Dept: FAMILY MEDICINE | Facility: CLINIC | Age: 55
End: 2020-02-18

## 2020-02-18 NOTE — TELEPHONE ENCOUNTER
.Reason for Call:  PRE-OP(done 02-14) - surgery - tomorrow -  Wed 2-      Detailed comments: please fax to [facility] // Shelia Tian @ [phone]    Phone Number Patient can be reached at: Other phone number:  [phone]    Best Time: any    Can we leave a detailed message on this number? Not Applicable    Call taken on 2/18/2020 at 8:58 AM by Mary Herman

## 2020-02-18 NOTE — TELEPHONE ENCOUNTER
Faxed pre-op notes, labs and Ekg (from 1/6/2020) to [facility], atten: Dolly, [phone], right fax confirmed at 9:13 am today, 2/18/2020.  [facility]  2nd Floor  Primary Care

## 2020-02-18 NOTE — PROGRESS NOTES
Faxed pre-op notes, labs and Ekg (from 1/6/2020) to [facility], atten: Dolly, [phone], right fax confirmed at 9:13 am today, 2/18/2020.  Samira Johnson [facility]  2nd Floor  Primary Care

## 2020-02-21 ENCOUNTER — PATIENT OUTREACH (OUTPATIENT)
Dept: CARE COORDINATION | Facility: CLINIC | Age: 55
End: 2020-02-21

## 2020-02-21 NOTE — PROGRESS NOTES
Clinic Care Coordination Contact  [facility]/Voicemail       Clinical Data: Care Coordinator Outreach    Lead RN Care Coordinator met with patient face to face on 2/14 in conjunction with PCP moraimat.  He was planning to have TIPS procedure on 2/19/20.  Writer reviewed CareEverywhere and the procedure(s) scheduled at [facility] were cancelled.    Outreach attempted x 1.  Left message on patient's voicemail with call back information and requested return call, either to covering RN CC today or to Lead RN CC.    Plan:  Care Coordinator will try to reach patient again within 10 business days.    Maki Mckenna, JAMN, RN (assisting in coverage for Melissa Behl, RN CC on 2/21/20)  [facility]  - [facility] Care Coordinator  Phone: [phone]

## 2020-03-06 ENCOUNTER — PATIENT OUTREACH (OUTPATIENT)
Dept: CARE COORDINATION | Facility: CLINIC | Age: 55
End: 2020-03-06

## 2020-03-06 NOTE — PROGRESS NOTES
Clinic Care Coordination Contact    Situation: Patient chart reviewed by care coordinator.    Background: RN CC reviewing chart for follow up.    Assessment: Patient inpatient at [facility] 3/5/20-3/6/20 for TIPS procedure.    Plan/Recommendations: RN CC will follow up with patient 1-2 business following hospital discharge.    Melissa Behl BSN, RN, PHN, CCM  Primary Care Clinical RN Care Coordinator     [phone]

## 2020-03-06 NOTE — LETTER
[facility]  Complex Care Plan  About Me:    Patient Name:  Marlon Mackey    YOB: 1965  Age:         54 year old   Derek MRN:    [id number] Telephone Information:  Home Phone [phone]   Mobile [phone]       Address:  [address]  [place] [facility] [place] [address] Email address:  No e-mail address on record      Emergency Contact(s)    Name Relationship Lgl Grd Work Phone Home Phone Mobile Phone   1. ESVIN MACKEY Mother   [phone]    2. DECLINE, PER PT Other   None            Primary language:  English     needed? No   [place] [facility]:  [phone] op. 1  Other communication barriers: Physical impairment  Preferred Method of Communication:  Mail  Current living arrangement: I live alone  Mobility Status/ Medical Equipment: Independent w/Device    Health Maintenance  Health Maintenance Reviewed: Due/Overdue   Health Maintenance Due   Topic Date Due     PREVENTIVE CARE VISIT  1965     HF ACTION PLAN  1965     TSH W/FREE T4 REFLEX  1965     ADVANCE CARE PLANNING  1965     DEPRESSION ACTION PLAN  1965     PNEUMOCOCCAL IMMUNIZATION 19-64 MEDIUM RISK (1 of 1 - PPSV23) 09/05/1983     ZOSTER IMMUNIZATION (1 of 2) 09/05/2014     LIPID  06/07/2019     INFLUENZA VACCINE (1) 09/01/2019     BMP  11/13/2019     COLORECTAL CANCER SCREENING  04/04/2020        My Access Plan  Medical Emergency 911   Primary Clinic Line [facility] - [phone]   24 Hour Appointment Line [phone] or  [phone]) (toll-free)   24 Hour Nurse Line [phone] (toll-free)   Preferred Urgent Care [facility], [phone]   [facility]  [phone]   Preferred Pharmacy [facility] [place] [place] - [address]     Behavioral Health Crisis Line The National Suicide Prevention Lifeline at 1-389.364.8203 or 511              My Care Team Members  Patient Care Team       Relationship Specialty Notifications Start End    Vocal, Frank Haddad MD PCP - General Internal Medicine  10/14/15     Phone: [phone] Fax: [phone]         [address] [place] [address]    Robert Rudolph MD Physician Gastroenterology  7/10/19     Phone: [phone] Fax: [phone]         [facility] [address] [address] [facility] [address]    Behl, Melissa K, RN Lead Care Coordinator Primary Care - CC Admissions 2/5/20     Phone: [phone] Fax: [phone]        Maximiliano Jean Baptiste MD MD Gastroenterology  2/5/20     Phone: [phone] Fax: [phone]         [facility] [address] [facility] [address]    Elizabeth Tnag APRN CNP Assigned PCP   2/23/20     Phone: [phone] Fax: [phone]         [address] [facility] [address]            My Care Plans  Self Management and Treatment Plan  Goals and (Comments)  Goals        General    1. Improve chronic symptoms (pt-stated)     Notes - Note edited  3/9/2020  3:26 PM by Behl, Melissa K, RN    Goal Statement: I want to understand my liver disease.  Date Goal set: 2/5/2020   Barriers: complex diagnosis  Strengths: care coordination  Date to Achieve By: 5/5/20  Patient expressed understanding of goal: yes  Action steps to achieve this goal:  1. I will schedule a hospital follow up appointment.  2. I will have a liver ultrasound April 6th at [facility].         2. Medical (pt-stated)     Notes - Note created  2/5/2020  2:05 PM by Behl, Melissa K, RN    Goal Statement: I want to have umbilical hernia surgery.  Date Goal set: 2/5/2020   Barriers: ascites, undergoing paracentesis, future TIPS  Strengths: care coordination  Date to Achieve By: 5/5/20  Patient expressed understanding of goal: yes  Action steps to achieve this goal:  1. I will ask my GI and/or PCP for a surgery referral                  Action Plans on File:                        Advance Care Plans/Directives Type:        My Medical and Care Information  Problem List   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)     Ulcer of esophagus without bleeding     Duodenal ulcer without hemorrhage or perforation and without obstruction     Alcoholic polyneuropathy (H)      Current Medications and Allergies:  See printed Medication Report.    Care Coordination Start Date: 2/5/2020   Frequency of Care Coordination: 2 weeks   Form Last Updated: 03/09/2020

## 2020-03-07 ENCOUNTER — NURSE TRIAGE (OUTPATIENT)
Dept: NURSING | Facility: CLINIC | Age: 55
End: 2020-03-07

## 2020-03-07 NOTE — TELEPHONE ENCOUNTER
He had surgery 2 days ago. He had pain pills in the hospital, and he felt fine. He now is in severe pain, and can't sleep. He has not taken anything for the pain at home. Care advice is for him to go to the ED to be seen, which he doesn't want to go and sit there and wait all day. He will see what happens and maybe call his kids.    Sherry Davis RN/ [facility]        Reason for Disposition    [1] SEVERE abdominal pain AND [2] present > 1 hour    Additional Information    Negative: Passed out (i.e., lost consciousness, collapsed and was not responding)    Negative: Shock suspected (e.g., cold/pale/clammy skin, too weak to stand, low BP, rapid pulse)    Negative: Sounds like a life-threatening emergency to the triager    Negative: [1] SEVERE back pain (e.g., excruciating) AND [2] sudden onset AND [3] age > 60    Negative: [1] Unable to urinate (or only a few drops) > 4 hours AND     [2] bladder feels very full (e.g., palpable bladder or strong urge to urinate)    Negative: [1] Urinary or bowel incontinence (i.e., loss of bladder or bowel control) AND [2] new onset    Negative: Numbness in groin or rectal area (i.e., loss of sensation)    Protocols used: BACK PAIN-A-AH

## 2020-03-09 ENCOUNTER — TELEPHONE (OUTPATIENT)
Dept: FAMILY MEDICINE | Facility: CLINIC | Age: 55
End: 2020-03-09

## 2020-03-09 ASSESSMENT — ACTIVITIES OF DAILY LIVING (ADL): DEPENDENT_IADLS:: TRANSPORTATION;CLEANING;LAUNDRY

## 2020-03-09 NOTE — TELEPHONE ENCOUNTER
.Reason for Call:  Requesting pain med    Detailed comments: was released from [facility] on Friday 03-06-20 after having surgery, the whole time he was hospitalized he was given pain medications and sent home without any prescription; is in a lot of pain and is requesting pain medication; thank you      Phone Number Patient can be reached at: Cell number on file:    Telephone Information:   Mobile [phone]       Best Time: anytime    Can we leave a detailed message on this number? YES if vm isn't full, but will try to answer;     Call taken on 3/9/2020 at 12:10 PM by Mayr Herman

## 2020-03-09 NOTE — PROGRESS NOTES
Clinic Care Coordination Contact    Clinic Care Coordination Contact  OUTREACH    Referral Information:  Referral Source: PCP    Primary Diagnosis: GI Disorders    Chief Complaint   Patient presents with     Clinic Care Coordination - [facility]     RN        Universal Utilization: [facility]ology at [facility].   Clinic Utilization  Difficulty keeping appointments:: Yes  Compliance Concerns: Yes  No-Show Concerns: Yes  No PCP office visit in Past Year: No  Utilization    Last refreshed: 3/9/2020  3:11 PM:  Hospital Admissions 0           Last refreshed: 3/9/2020  3:11 PM:  ED Visits 0           Last refreshed: 3/9/2020  3:11 PM:  No Show Count (past year) 6              Current as of: 3/9/2020  3:11 PM              Clinical Concerns:  Current Medical Concerns:  Patient was inpatient at [facility] 3/5/20-3/6/20 for a planned TIPS procedure.  Patient reports uncontrolled pain since arriving home from the hospital and his pain medications wearing off, see 3/7/20 nurse triage note and 3/9/20 telephone encounters.  Patient denies signs/symptoms of infection of his surgical incisions.  Patient denies abdominal bloating.  Patient states he can have paracentesis for the next 3 months and has left a message for his GI provider for orders.    RN CC offered to schedule a hospital follow up with PCP for patient, however, he declined stating he would wait until his PCP prescribed him pain medication.  Patient stated he would accept a call to schedule this appointment once he has pain medicine.  Patient Active Problem List   Diagnosis     CARDIOVASCULAR SCREENING; LDL GOAL LESS THAN 130     Overweight (BMI 25.0-29.9)     Tobacco abuse     Cervical radiculopathy     Cardiomyopathy, alcoholic (H)     Tinnitus, bilateral     Chronic systolic congestive heart failure (H)     Alcoholic cirrhosis of liver with ascites (H)     Alcohol abuse     Diarrhea, unspecified type     ROSIBEL  Verified Results  MICROALBUMIN, URINE [id number] 12:01AM DEBBIE ROACH   [Nov 30, 2018 6:02PM DEBBIE ROACH]  1. Call patient she is anemic. I would like for her to see a hematologist. Please help patient make an appointment with Dr. White  2. Kidney function is slightly diminished. Patient appears to be dehydrated. She is spilling some protein in her urine. This may account for her anemia.  3. Thyroid test is normal. Diabetes is under good control at 6.4%.     Test Name Result Flag Reference   MICROALBUMIN 2.61 mg/dl     CREATININE RANDOM URINE 59.30 mg/dl     MICROALB/CREAT RATIO 44.0 mg/g H <30   Short-term hyperglycemia, exercise, urinary tract infections, marked hypertension, heart failure, and acute febrile illness can cause transient elevations in urinary albumin excretion.  Due to marked day-to-day variability in albumin excretion, at least two of three collections done in a 3 to 6 month period should show elevated levels before initially designating a patient as having microalbuminuria.     CBC WITH AUTOMATED DIFFERENTIAL [id number] 12:01AM DEBBIE ROACH   [Nov 30, 2018 6:02PM DEBBIE ROACH]  1. Call patient she is anemic. I would like for her to see a hematologist. Please help patient make an appointment with Dr. White  2. Kidney function is slightly diminished. Patient appears to be dehydrated. She is spilling some protein in her urine. This may account for her anemia.  3. Thyroid test is normal. Diabetes is under good control at 6.4%.     Test Name Result Flag Reference   WHITE BLOOD COUNT 6.1 K/mcL  4.2-11.0   RED CELL COUNT 3.53 mil/mcL L 4.00-5.20   HEMOGLOBIN 10.0 g/dl L 12.0-15.5   HEMATOCRIT 32.6 % L 36.0-46.5   MEAN CORPUSCULAR VOLUME 92.4 fL  78.0-100.0   MEAN CORPUSCULAR HEMOGLOBIN 28.3 pg  26.0-34.0   MEAN CORPUSCULAR HGB CONC 30.7 g/dl L 32.0-36.5   RDW-CV 13.4 %  11.0-15.0   PLATELET COUNT 309 K/mcL  140-450   CLAUDIA% 52 %     LYM% 35 %     MON% 7 %     EOS% 5 %     BASO% 1 %     CLAUDIA ABS 3.2  "(acute kidney injury) (H)     Current severe episode of major depressive disorder without psychotic features (H)     Ulcer of esophagus without bleeding     Duodenal ulcer without hemorrhage or perforation and without obstruction     Alcoholic polyneuropathy (H)      Current Behavioral Concerns: No concerns at this time.    Education Provided to patient: RN CC reviewed hospital discharge instructions and plan of care.     Pain  Pain (GOAL):: Yes  Type: Chronic (>3mo)  Location of chronic pain:: abdomen   Radiating: No  Progression: Unchanged  Description of pain: Aching  Chronic pain severity:: 10(\"It's ok today, I just had a procedure yesterday\")  Limitation of routine activities due to chronic pain:: Yes  Description: Unable to perform most daily activities (chores, hobbies, social activities, driving)  Alleviating Factors: Rest  Aggravating Factors: Activity  Health Maintenance Reviewed: Due/Overdue   Health Maintenance Due   Topic Date Due     PREVENTIVE CARE VISIT  1965     HF ACTION PLAN  1965     TSH W/FREE T4 REFLEX  1964     ADVANCE CARE PLANNING  1964     DEPRESSION ACTION PLAN  1964     PNEUMOCOCCAL IMMUNIZATION 19-64 MEDIUM RISK (1 of 1 - PPSV23) 09/05/1983     ZOSTER IMMUNIZATION (1 of 2) 09/05/2014     LIPID  06/07/2019     INFLUENZA VACCINE (1) 09/01/2019     BMP  11/13/2019     COLORECTAL CANCER SCREENING  04/04/2020      Clinical Pathway: None    Medication Management:  Patient independent in medication management and verbalizes adherence and understanding of medication regimen.   Patient declines to take ibuprofen or Tylenol for pain control.    Functional Status:  Dependent ADLs:: Wheelchair-independent  Dependent IADLs:: Transportation, Cleaning, Laundry  Bed or wheelchair confined:: Yes  Mobility Status: Independent w/Device    Living Situation:  Current living arrangement:: I live alone  Type of residence:: Town home    Lifestyle & Psychosocial Needs:  Lifestyle " K/mcL  1.8-7.7   LYM ABS 2.1 K/mcL  1.0-4.0   MON ABS 0.4 K/mcL  0.3-0.9   EOS ABS 0.3 K/mcL  0.1-0.5   BASO ABS 0.0 K/mcL  0.0-0.3   DIFF TYPE      AUTOMATED DIFFERENTIAL   NRBC 0 /100 WBC  0   PERCENT IMMATURE GRANULOCYTES 0 %     ABSOLUTE IMMATURE GRANULOCYTES 0.0 K/mcL  0-0.2     COMP METABOLIC PNL [id number] 12:01AM DEBBIE ROACH   [Nov 30, 2018 6:02PM DEBBIE ROACH]  1. Call patient she is anemic. I would like for her to see a hematologist. Please help patient make an appointment with Dr. White  2. Kidney function is slightly diminished. Patient appears to be dehydrated. She is spilling some protein in her urine. This may account for her anemia.  3. Thyroid test is normal. Diabetes is under good control at 6.4%.     Test Name Result Flag Reference   SODIUM 140 mmol/L  135-145   POTASSIUM 4.9 mmol/L  3.4-5.1   CHLORIDE 106 mmol/L     CARBON DIOXIDE 27 mmol/L  21-32   ANION GAP 12 mmol/L  10-20   GLUCOSE 90 mg/dl  65-99   BUN 36 mg/dl H 6-20   CREATININE 1.13 mg/dl H 0.51-0.95   GFR EST.AFRICAN AMER 57     eGFR 30-59 mL/min/1.73m2 = Moderate decrease in kidney function. Stage 3 CKD (chronic kidney disease) or moderate kidney disease.   GFR EST.NONAFRI AMER 49     eGFR 30-59 mL/min/1.73m2 = Moderate decrease in kidney function. Stage 3 CKD (chronic kidney disease) or moderate kidney disease.   BUN/CREATININE RATIO 32 H 7-25   BILIRUBIN TOTAL 0.2 mg/dl  0.2-1.0   GOT/AST 26 Units/L  <38   ALKALINE PHOSPHATASE 48 Units/L     ALBUMIN 3.9 g/dl  3.6-5.1   TOTAL PROTEIN 8.3 g/dl H 6.4-8.2   GLOBULIN (CALCULATED) 4.4 g/dl H 2.0-4.0   A/G RATIO 0.9 L 1.0-2.4   CALCIUM 9.5 mg/dl  8.4-10.2   GPT/ALT 32 Units/L  <79   FASTING STATUS UNKNOWN hrs       HEMOGLOBIN A1C GLYCOSYLATED [id number] 12:01AM DEBBIE ROACH   [Nov 30, 2018 6:02PM DEBBIE ROACH]  1. Call patient she is anemic. I would like for her to see a hematologist. Please help patient make an appointment with Dr. White  2. Kidney function is slightly      Physical activity     Days per week: 0 days     Minutes per session: 0 min     Stress: Not on file     Social Needs     Financial resource strain: Not very hard     Food insecurity     Worry: Never true     Inability: Never true     Transportation needs     Medical: No     Non-medical: No     Diet:: No added salt, Low saturated fat  Inadequate nutrition (GOAL):: No  Tube Feeding: No  Inadequate activity/exercise (GOAL):: No  Significant changes in sleep pattern (GOAL): No  Transportation means:: Medical transport, Public transportation, Family, Friend     Mormonism or spiritual beliefs that impact treatment:: No  Mental health DX:: No  Mental health management concern (GOAL):: No  Informal Support system:: Parent, Family, Friends, Children   Socioeconomic History     Marital status: Single     Spouse name: Not on file     Number of children: Not on file     Years of education: Not on file     Highest education level: Not on file     Tobacco Use     Smoking status: Current Every Day Smoker     Packs/day: 1.00     Smokeless tobacco: Never Used     Tobacco comment: it relaxes him when he is trying to sleep   Substance and Sexual Activity     Alcohol use: Yes     Frequency: 2-4 times a month     Drinks per session: 1 or 2     Binge frequency: Less than monthly     Comment: helps him sleep     Drug use: No     Sexual activity: Yes     Partners: Female               Resources and Interventions:  Current Resources:      Community Resources: PCA, County Worker(Pt's daughter is his PCA)  Supplies used at home:: None  Equipment Currently Used at Home: wheelchair, power    Advance Care Plan/Directive  Advanced Care Plans/Directives on file:: No  Advanced Care Plan/Directive Status: Considering Options    Referrals Placed: None     Goals:   Goals        General    1. Improve chronic symptoms (pt-stated)     Notes - Note edited  3/9/2020  3:26 PM by Behl, Melissa K, RN    Goal Statement: I want to understand my liver  diminished. Patient appears to be dehydrated. She is spilling some protein in her urine. This may account for her anemia.  3. Thyroid test is normal. Diabetes is under good control at 6.4%.     Test Name Result Flag Reference   HEMOGLOBIN A1C GLYH 6.4 % H 4.5-5.6   ----DIABETIC SCREENING---  NON DIABETIC                 <5.7%  INCREASED RISK                5.7-6.4%  DIAGNOSTIC FOR DIABETES      >6.4%     ----DIABETIC CONTROL---     A1C%           eAG mg/dL  6.0            126  6.5            140  7.0            154  7.5            169  8.0            183  8.5            197  9.0            212  9.5            226  10.0           240     TSH [id number] 12:01AM DEBBIE ROACH   [Nov 30, 2018 6:02PM DEBBIE ROACH]  1. Call patient she is anemic. I would like for her to see a hematologist. Please help patient make an appointment with Dr. White  2. Kidney function is slightly diminished. Patient appears to be dehydrated. She is spilling some protein in her urine. This may account for her anemia.  3. Thyroid test is normal. Diabetes is under good control at 6.4%.     Test Name Result Flag Reference   TSH 2.418 mcUnits/mL  0.350-5.000        disease.  Date Goal set: 2/5/2020   Barriers: complex diagnosis  Strengths: care coordination  Date to Achieve By: 5/5/20  Patient expressed understanding of goal: yes  Action steps to achieve this goal:  1. I will schedule a hospital follow up appointment.  2. I will have a liver ultrasound April 6th at [facility].         2. Medical (pt-stated)     Notes - Note created  2/5/2020  2:05 PM by Behl, Melissa K, RN    Goal Statement: I want to have umbilical hernia surgery.  Date Goal set: 2/5/2020   Barriers: ascites, undergoing paracentesis, future TIPS  Strengths: care coordination  Date to Achieve By: 5/5/20  Patient expressed understanding of goal: yes  Action steps to achieve this goal:  1. I will ask my GI and/or PCP for a surgery referral                 Patient/Caregiver understanding: Patient verbalized understanding of information provided.    Outreach Frequency: 2 weeks      Plan:   1. Patient will schedule a hospital follow up with PCP once PCP addresses his request for pain medication.  2. Patient will attend 4/6/20 liver ultrasound.  3. Patient will await call back from GI provider regarding request for paracentesis orders for the next 3 months.  4. Patient will continue to work on scheduling with a surgeon for hernia repair.  5. RN CC will follow up with patient in 2 weeks.    Melissa Behl BSN, RN, PHN, CCM  Primary Care Clinical RN Care Coordinator  [facility]   [phone]

## 2020-03-09 NOTE — TELEPHONE ENCOUNTER
This writer attempted to contact patient on 03/09/20      Reason for call pain medication denied and left message.      If patient calls back:   1st floor [place] Care Team (MA/TC) called. Inform patient that someone from the team will contact them, document that pt called and route to care team.         Regulo Turner MA

## 2020-03-09 NOTE — TELEPHONE ENCOUNTER
"S-(situation): Patient requesting pain medication.    B-(background): Patient had TIPS procedure 3/5/20 at [facility].    A-(assessment): Patient states since hospital discharge 3/6/20 \"when my pain medication wore off\" his pain has been 9-10/10 \"through my whole torso.\"  Patient denies abdominal bloating.  No signs/symptoms of infection of abdominal surgical incision reported.  Patient declined to schedule a hospital visit as offered by RN CC \"until my pain is take care of.\"    R-(recommendations/plan): Please advise regarding pain medication request.    Melissa Behl BSN, RN, PHN, CCM  Primary Care Clinical RN Care Coordinator  [facility]   [phone]      "

## 2020-03-10 NOTE — TELEPHONE ENCOUNTER
RN CC received a call from patient.  Writer updated patient on PCP response below.  Patient verbalized understanding.    Melissa Behl BSN, RN, PHN, CCM  Primary Care Clinical RN Care Coordinator  [facility]   [phone]

## 2020-03-11 ENCOUNTER — OFFICE VISIT (OUTPATIENT)
Dept: FAMILY MEDICINE | Facility: CLINIC | Age: 55
End: 2020-03-11
Payer: COMMERCIAL

## 2020-03-11 VITALS
HEART RATE: 89 BPM | BODY MASS INDEX: 25.9 KG/M2 | WEIGHT: 185 LBS | OXYGEN SATURATION: 99 % | DIASTOLIC BLOOD PRESSURE: 51 MMHG | TEMPERATURE: 98 F | RESPIRATION RATE: 16 BRPM | HEIGHT: 71 IN | SYSTOLIC BLOOD PRESSURE: 89 MMHG

## 2020-03-11 DIAGNOSIS — R10.84 GENERALIZED COLICKY ABDOMINAL PAIN: ICD-10-CM

## 2020-03-11 DIAGNOSIS — Z95.828 S/P TIPS (TRANSJUGULAR INTRAHEPATIC PORTOSYSTEMIC SHUNT): ICD-10-CM

## 2020-03-11 DIAGNOSIS — K70.31 ALCOHOLIC CIRRHOSIS OF LIVER WITH ASCITES (H): Primary | ICD-10-CM

## 2020-03-11 PROCEDURE — 99214 OFFICE O/P EST MOD 30 MIN: CPT | Performed by: NURSE PRACTITIONER

## 2020-03-11 RX ORDER — OXYCODONE HYDROCHLORIDE 5 MG/1
5 TABLET ORAL EVERY 6 HOURS PRN
Qty: 8 TABLET | Refills: 0 | Status: SHIPPED | OUTPATIENT
Start: 2020-03-11 | End: 2020-04-24

## 2020-03-11 ASSESSMENT — MIFFLIN-ST. JEOR: SCORE: 1696.28

## 2020-03-11 ASSESSMENT — PAIN SCALES - GENERAL: PAINLEVEL: EXTREME PAIN (9)

## 2020-03-11 NOTE — PATIENT INSTRUCTIONS
At [facility], we strive to deliver an exceptional experience to you, every time we see you. If you receive a survey, please complete it as we do value your feedback.  If you have MyChart, you can expect to receive results automatically within 24 hours of their completion.  Your provider will send a note interpreting your results as well.   If you do not have MyChart, you should receive your results in about a week by mail.    Your care team:                            Family Medicine Internal Medicine   MD Frank Barkley MD Shantel Branch-Fleming, MD Katya Georgiev PA-C Megan Hill, APRN CNP    Kaveh Quintanilla, MD Pediatrics   Trino Coy, PANESSA Barnes, MD Gypsy Ulloa APRN CNP   MD Hyacinth Pepper MD Deborah Mielke, MD Kim Thein, APRN [facility]      Clinic hours: Monday - Thursday 7 am-7 pm; Fridays 7 am-5 pm.   Urgent care: Monday - Friday 11 am-9 pm; Saturday and Sunday 9 am-5 pm.    Clinic: [phone]       [facility]: Monday - Thursday 8 am - 7 pm; Friday 8 am - 6 pm  [facility]: [phone]     Use www.[email or web address].org for 24/7 diagnosis and treatment of dozens of conditions.    Patient Education     Abdominal Pain    Abdominal pain is pain in the stomach or belly area. Everyone has this pain from time to time. In many cases it goes away on its own. But abdominal pain can sometimes be due to a serious problem, such as appendicitis. So it s important to know when to get help.  Causes of abdominal pain  There are many possible causes of abdominal pain. Common causes in adults include:    Constipation, diarrhea, or gas    Stomach acid flowing back up into the esophagus (acid reflux or heartburn)    Severe acid reflux, called GERD (gastroesophageal reflux disease)    A sore in the lining of the stomach or small intestine (peptic ulcer)    Inflammation of the gallbladder, liver, or  pancreas    Gallstones or kidney stones    Appendicitis     Intestinal blockage     An internal organ pushing through a muscle or other tissue (hernia)    Urinary tract infections    In women, menstrual cramps, fibroids, ovarian cysts, pelvic inflammatory disease, or endometriosis    Inflammation or infection of the intestines, including Crohn's disease and ulcerative colitis    Irritable bowel syndrome  Diagnosing the cause of abdominal pain  Your healthcare provider will give you a physical exam help find the cause of your pain. If needed, you will have tests. Belly pain has many possible causes. So it can be hard to find the reason for your pain. Giving details about your pain can help. Tell your provider where and when you feel the pain, and what makes it better or worse. Also let your provider know if you have other symptoms such as:    Fever    Tiredness    Upset stomach (nausea)    Vomiting    Changes in bathroom habits    Blood in the stool or black, tarry stool    Weight loss that you can't explain (involuntary weight loss?)  Also report any family history of stomach or intestinal problems, or cancers. Tell your provider about all your alcohol use and drug use. Tell your provider about all medicines you use, including herbs, vitamins, and supplements.   Treating abdominal pain  Some causes of pain need emergency medical treatment right away. These include appendicitis or a bowel blockage. Other problems can be treated with rest, fluids, or medicines. Your healthcare provider can give you specific instructions for treatment or self-care based on what is causing your pain.     If you have vomiting or diarrhea, sip water or other clear fluids. When you are ready to eat solid foods again, start with small amounts of easy-to-digest, low-fat foods. These include apple sauce, toast, or crackers.  When to get medical care  Call 911 or go to the hospital right away if you:    Can t pass stool and are vomiting    Are  vomiting blood or have bloody diarrhea or black, tarry diarrhea    Have chest, neck, or shoulder pain    Feel like you might pass out    Have pain in your shoulder blades with nausea    Have sudden, severe belly pain    Have new, severe pain unlike any you have felt before    Have a belly that is rigid, hard, and hurts to touch  Call your healthcare provider if you have:    Pain for more than 5 days    Bloating for more than 2 days    Diarrhea for more than 5 days    A fever of 100.4 F (38 C) or higher, or as directed by your healthcare provider    Pain that gets worse    Weight loss for no reason    Continued lack of appetite    Blood in your stool  How to prevent abdominal pain  Here are some tips to help prevent abdominal pain:    Eat smaller amounts of food at each meal.    Don't eat greasy, fried, or other high-fat foods.    Don't eat foods that give you gas.    Exercise regularly.    Drink plenty of fluids.  To help prevent GERD symptoms:    Quit smoking.    Reduce alcohol and foods that increase stomach acid.    Don't use aspirin or over-the-counter pain and fever medicines, if possible. This includes nonsteroidal anti-inflammatory drugs (NSAIDs).    Lose excess weight.    Finish eating at least 2 hours before you go to bed or lie down.    Raise the head of your bed.  Date Last Reviewed: 7/1/2016 2000-2019 [employer]. [address] [place] [address], [place] [address]. All rights reserved. This information is not intended as a substitute for professional medical care. Always follow your healthcare professional's instructions.

## 2020-03-11 NOTE — PROGRESS NOTES
Subjective     Marlon Mackey is a 54 year old male who presents to clinic today for the following health issues:    HPI       Hospital Follow-up Visit:    Hospital/Nursing Home/IP Rehab Facility: [facility]  Date of Admission: 03/05/2020  Date of Discharge: 03/06/2020  Reason(s) for Admission: surgery/procedure            Problems taking medications regularly:  None       Medication changes since discharge: None       Problems adhering to non-medication therapy:  None    Summary of hospitalization:  CareEverywhere information obtained and reviewed  Diagnostic Tests/Treatments reviewed.  Follow up needed: GI on 4/6 for TIPS US  Other Healthcare Providers Involved in Patient s Care:         GI,  Update since discharge: improved.     Post Discharge Medication Reconciliation: discharge medications reconciled, continue medications without change.  Plan of care communicated with patient     Coding guidelines for this visit:  Type of Medical   Decision Making Face-to-Face Visit       within 7 Days of discharge Face-to-Face Visit        within 14 days of discharge   Moderate Complexity [id number]   High Complexity [id number]          Patient comes in today requesting something for pain- states he got 1 Oxycodone in the Er last night but it is not controlling his generalized abdominal pain.  He feels as if he's going to need another paracentesis soon, denies cp, shortness of breath, palpitations.  His abdominal pain is worse with movement and his umbilical hernia has enlarged but is still reducible.  He complains of constipation, has lactulose.     He also complains of bilateral LE pain/swelling.  He had negative RLE DVT US yesterday.  He is not elevating his feet regularly.   He is not sleeping well due to LE  And abdominal pain. He has appt with Dr. Childs on Friday, will have TIPS US on 4/6/2020.      Patient Active Problem List   Diagnosis     CARDIOVASCULAR SCREENING; LDL GOAL LESS THAN 130      Overweight (BMI 25.0-29.9)     Tobacco abuse     Cervical radiculopathy     Cardiomyopathy, alcoholic (H)     Tinnitus, bilateral     Chronic systolic congestive heart failure (H)     Alcoholic cirrhosis of liver with ascites (H)     Alcohol abuse     Diarrhea, unspecified type     ROSIBEL (acute kidney injury) (H)     Current severe episode of major depressive disorder without psychotic features (H)     Ulcer of esophagus without bleeding     Duodenal ulcer without hemorrhage or perforation and without obstruction     Alcoholic polyneuropathy (H)     Past Surgical History:   Procedure Laterality Date     ORTHOPEDIC SURGERY Right     arm       Social History     Tobacco Use     Smoking status: Current Every Day Smoker     Packs/day: 1.00     Smokeless tobacco: Never Used     Tobacco comment: it relaxes him when he is trying to sleep   Substance Use Topics     Alcohol use: Yes     Frequency: 2-4 times a month     Drinks per session: 1 or 2     Binge frequency: Less than monthly     Comment: helps him sleep     Family History   Problem Relation Age of Onset     Cancer Father      Family History Negative Other          Current Outpatient Medications   Medication Sig Dispense Refill     bismuth subsalicylate (PEPTO BISMOL) 262 MG/15ML suspension Take 15 mLs by mouth every 6 hours as needed for indigestion 180 mL 0     bumetanide (BUMEX) 1 MG tablet Take 1 mg by mouth daily Take with Spironolactone.       carvedilol (COREG) 3.125 MG tablet TAKE 1 TABLET BY MOUTH TWICE DAILY WITH MEALS 180 tablet 1     lactulose (CHRONULAC) 10 GM/15ML solution Take 20 g by mouth       Multiple Vitamins-Minerals (CENTROVITE) TABS Take 1 tablet by mouth       multivitamins w/minerals tablet Take 1 tablet by mouth daily 90 tablet 1     oxyCODONE (ROXICODONE) 5 MG tablet Take 1 tablet (5 mg) by mouth every 6 hours as needed for severe pain 8 tablet 0     pantoprazole (PROTONIX) 40 MG EC tablet Take 1 tablet (40 mg) by mouth daily 90 tablet 1  "    potassium chloride ER (K-DUR/KLOR-CON M) 20 MEQ CR tablet TAKE 1 TABLET(20 MEQ) BY MOUTH TWICE DAILY 60 tablet 0     ramipril (ALTACE) 2.5 MG capsule TAKE ONE CAPSULE BY MOUTH TWICE DAILY. TAKE WITH CARVEDILOL AND BUMETANIDE 60 capsule 7     rifaximin (XIFAXAN) 550 MG TABS tablet Take 200 mg by mouth 2 times daily       spironolactone (ALDACTONE) 25 MG tablet Take 1 tablet (25 mg) by mouth daily 90 tablet 1     tamsulosin (FLOMAX) 0.4 MG capsule Take 1 capsule (0.4 mg) by mouth daily 90 capsule 1     BP Readings from Last 3 Encounters:   03/11/20 (!) 89/51   02/14/20 122/79   01/06/20 105/70    Wt Readings from Last 3 Encounters:   03/11/20 83.9 kg (185 lb)   02/14/20 86.7 kg (191 lb 3.2 oz)   01/06/20 81.2 kg (179 lb)           Reviewed and updated as needed this visit by Provider         Review of Systems   ROS COMP: Constitutional, HEENT, cardiovascular, pulmonary, gi and gu systems are negative, except as otherwise noted.      Objective    BP (!) 89/51 (BP Location: Left arm, Patient Position: Chair, Cuff Size: Adult Regular)   Pulse 89   Temp 98  F (36.7  C) (Oral)   Resp 16   Ht 1.803 m (5' 11\")   Wt 83.9 kg (185 lb)   SpO2 99%   BMI 25.80 kg/m    Body mass index is 25.8 kg/m .  Physical Exam   GENERAL: healthy, alert and no distress  EYES: Eyes grossly normal to inspection, PERRL and conjunctivae and sclerae normal  HENT: ear canals and TM's normal, nose and mouth without ulcers or lesions  NECK: no adenopathy, no asymmetry, masses, or scars and thyroid normal to palpation  RESP: normal respiratory effort, lungs clear to auscultation - no rales, rhonchi or wheezes  CV: regular rate and rhythm, normal S1 S2, no S3 or S4, no murmur, click or rub, no peripheral edema and peripheral pulses strong  ABDOMEN: soft, nontender, large umbilical hernia that is reducible, no peritoneal signs. There is mild RUQ bruising present.  MS: no gross musculoskeletal defects noted, no edema  SKIN: no suspicious lesions " or rashes  NEURO: Normal strength and tone, mentation intact and speech normal  PSYCH: mentation appears normal, affect normal/bright  LYMPH: normal ant/post cervical, supraclavicular nodes    Diagnostic Test Results:  Labs reviewed in [facility]  Labs done yesterday at [facility] as below:  CBC/DIFF - Abnormal; Notable for the following components:   Result Value   RBC 2.80 (low)   HEMOGLOBIN 10.1 (low)   HEMATOCRIT 30.0 (low)    (high)   MCH 36 (high)   PLATELET COUNT 104 (low)   IG% 2.1 (high)   LYMPH ABSOLUTE 0.86 (low)   MONO ABSOLUTE 1.85 (high)   All other components within normal limits: WBC 7.7   BASIC METAB PROFILE - Abnormal; Notable for the following components:   CREATININE 0.63 (low)   CALCIUM, SERUM 7.8 (low)   All other components within normal limits   LIVER PROFILE - Abnormal; Notable for the following components:    (high)   ALKALINE P'TASE 380 (high)   AST (SGOT) 253 (high)   PROTEIN TOTAL 6.0 (low)   ALBUMIN 2.3 (low)   BILIRUBIN-DIRECT 1.93 (high)   BILIRUBIN-TOTAL 2.9 (high)   All other components within normal limits   LIPASE - Normal (344)   ALCOHOL (ETOH), BLOOD - Normal (<3)     Imaging:   US VENOUS LOWER EXT RIGHT   Final Result   IMPRESSION:     Normal exam. No evidence of right lower extremity deep venous thrombosis.           Assessment & Plan     1. Alcoholic cirrhosis of liver with ascites (H)  Gave #8 Oxycodone tabs for pain, he understand that I will not write any further narcotic pain medications for him and that he'll have to address his pain with his GI provider and Dr. Childs.   - oxyCODONE (ROXICODONE) 5 MG tablet; Take 1 tablet (5 mg) by mouth every 6 hours as needed for severe pain  Dispense: 8 tablet; Refill: 0    2. S/P TIPS (transjugular intrahepatic portosystemic shunt)      3. Generalized colicky abdominal pain  Follow with Dr. Childs on Friday, patients abdominal exam is benign today.  His umbilical hernia is soft and reducible. Follow with GI.  He is  instructed to call his GI for follow up. Continue Lactulose for constipation.  - oxyCODONE (ROXICODONE) 5 MG tablet; Take 1 tablet (5 mg) by mouth every 6 hours as needed for severe pain  Dispense: 8 tablet; Refill: 0       See Patient Instructions    Return in about 1 week (around 3/18/2020), or if symptoms worsen or fail to improve, for Routine Visit.    SAMY Mullen [facility]

## 2020-03-13 ENCOUNTER — OFFICE VISIT (OUTPATIENT)
Dept: FAMILY MEDICINE | Facility: CLINIC | Age: 55
End: 2020-03-13
Payer: COMMERCIAL

## 2020-03-13 VITALS
SYSTOLIC BLOOD PRESSURE: 92 MMHG | RESPIRATION RATE: 12 BRPM | HEART RATE: 98 BPM | WEIGHT: 192 LBS | TEMPERATURE: 96.8 F | DIASTOLIC BLOOD PRESSURE: 59 MMHG | HEIGHT: 71 IN | OXYGEN SATURATION: 95 % | BODY MASS INDEX: 26.88 KG/M2

## 2020-03-13 DIAGNOSIS — K70.31 ALCOHOLIC CIRRHOSIS OF LIVER WITH ASCITES (H): Primary | ICD-10-CM

## 2020-03-13 DIAGNOSIS — Z74.09 IMPAIRED MOBILITY AND ACTIVITIES OF DAILY LIVING: ICD-10-CM

## 2020-03-13 DIAGNOSIS — I50.22 CHRONIC SYSTOLIC HEART FAILURE (H): ICD-10-CM

## 2020-03-13 DIAGNOSIS — Z78.9 IMPAIRED MOBILITY AND ACTIVITIES OF DAILY LIVING: ICD-10-CM

## 2020-03-13 PROCEDURE — 99214 OFFICE O/P EST MOD 30 MIN: CPT | Performed by: INTERNAL MEDICINE

## 2020-03-13 RX ORDER — SPIRONOLACTONE 50 MG/1
50 TABLET, FILM COATED ORAL DAILY
Qty: 30 TABLET | Refills: 5 | Status: SHIPPED | OUTPATIENT
Start: 2020-03-13 | End: 2020-04-09

## 2020-03-13 RX ORDER — FUROSEMIDE 40 MG
40 TABLET ORAL DAILY
Qty: 30 TABLET | Refills: 5 | Status: SHIPPED | OUTPATIENT
Start: 2020-03-13 | End: 2020-04-09

## 2020-03-13 ASSESSMENT — PAIN SCALES - GENERAL: PAINLEVEL: EXTREME PAIN (8)

## 2020-03-13 ASSESSMENT — MIFFLIN-ST. JEOR: SCORE: 1728.04

## 2020-03-13 NOTE — PROGRESS NOTES
Subjective     Marlon Mackey is a 54 year old male who presents to clinic today for the following health issues:    HPI   Swelling in feet      Duration: few days    Description (location/character/radiation): swelling in both feet after undergoing TIPs procedure but patient doesn't take his medications as instructed.    Intensity:  moderate    Accompanying signs and symptoms: pain from abdomen to legs.     History (similar episodes/previous evaluation): None    Precipitating or alleviating factors: standing and walking     Therapies tried and outcome: elevating feet    Patient claims that his appetite has improved after TIPS procedure. Weight gain of 7 pounds within 48 hours. Since December 2019, patient gained 14 pounds. He did not take his morning medications.   Last drink yesterday when a claudia came over. I don't really drink much.    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)     Ulcer of esophagus without bleeding     Duodenal ulcer without hemorrhage or perforation and without obstruction     Alcoholic polyneuropathy (H)     Past Surgical History:   Procedure Laterality Date     ORTHOPEDIC SURGERY Right     arm       Social History     Tobacco Use     Smoking status: Current Every Day Smoker     Packs/day: 1.00     Smokeless tobacco: Never Used     Tobacco comment: it relaxes him when he is trying to sleep   Substance Use Topics     Alcohol use: Yes     Frequency: 2-4 times a month     Drinks per session: 1 or 2     Binge frequency: Less than monthly      Comment: helps him sleep     Family History   Problem Relation Age of Onset     Cancer Father      Family History Negative Other          Current Outpatient Medications   Medication Sig Dispense Refill     bismuth subsalicylate (PEPTO BISMOL) 262 MG/15ML suspension Take 15 mLs by mouth every 6 hours as needed for indigestion 180 mL 0     bumetanide (BUMEX) 1 MG tablet Take 1 mg by mouth daily Take with Spironolactone.       carvedilol (COREG) 3.125 MG tablet TAKE 1 TABLET BY MOUTH TWICE DAILY WITH MEALS 180 tablet 1     lactulose (CHRONULAC) 10 GM/15ML solution Take 20 g by mouth       Multiple Vitamins-Minerals (CENTROVITE) TABS Take 1 tablet by mouth       multivitamins w/minerals tablet Take 1 tablet by mouth daily 90 tablet 1     oxyCODONE (ROXICODONE) 5 MG tablet Take 1 tablet (5 mg) by mouth every 6 hours as needed for severe pain 8 tablet 0     pantoprazole (PROTONIX) 40 MG EC tablet Take 1 tablet (40 mg) by mouth daily 90 tablet 1     potassium chloride ER (K-DUR/KLOR-CON M) 20 MEQ CR tablet TAKE 1 TABLET(20 MEQ) BY MOUTH TWICE DAILY 60 tablet 0     ramipril (ALTACE) 2.5 MG capsule TAKE ONE CAPSULE BY MOUTH TWICE DAILY. TAKE WITH CARVEDILOL AND BUMETANIDE 60 capsule 7     rifaximin (XIFAXAN) 550 MG TABS tablet Take 200 mg by mouth 2 times daily       spironolactone (ALDACTONE) 25 MG tablet Take 1 tablet (25 mg) by mouth daily 90 tablet 1     tamsulosin (FLOMAX) 0.4 MG capsule Take 1 capsule (0.4 mg) by mouth daily 90 capsule 1     No Known Allergies  Recent Labs   Lab Test 05/13/19  1525 04/04/19  1202 02/19/19  1216  06/07/18  1216 06/07/18  1207   LDL  --   --   --   --  73 69   HDL  --   --   --   --  51 53   TRIG  --   --   --   --  136 135   ALT 23 41 58   < >  --   --    CR 1.14 1.84*  --    < >  --  0.77   GFRESTIMATED 72 40*  --    < >  --  >90   GFRESTBLACK 84 47*  --    < >  --  >90   POTASSIUM 4.0 2.8*  --    < >  --  4.2    < > = values in this interval not displayed.      BP Readings from Last 3  "Encounters:   03/13/20 92/59   03/11/20 (!) 89/51   02/14/20 122/79    Wt Readings from Last 3 Encounters:   03/13/20 87.1 kg (192 lb)   03/11/20 83.9 kg (185 lb)   02/14/20 86.7 kg (191 lb 3.2 oz)                      Reviewed and updated as needed this visit by Provider         Review of Systems   ROS COMP: CONSTITUTIONAL: NEGATIVE for fever, chills, change in weight  INTEGUMENTARY/SKIN: NEGATIVE for worrisome rashes, moles or lesions  EYES: NEGATIVE for vision changes or irritation  ENT/MOUTH: NEGATIVE for ear, mouth and throat problems  RESP: NEGATIVE for significant cough or SOB  CV: NEGATIVE for chest pain, palpitations or peripheral edema  GI: NEGATIVE for nausea, abdominal pain, heartburn, or change in bowel habits  : NEGATIVE for frequency, dysuria, or hematuria  MUSCULOSKELETAL: NEGATIVE for significant arthralgias or myalgia  NEURO: NEGATIVE for weakness, dizziness or paresthesias  ENDOCRINE: NEGATIVE for temperature intolerance, skin/hair changes  HEME: NEGATIVE for bleeding problems  PSYCHIATRIC: NEGATIVE for changes in mood or affect      Objective    BP 92/59 (BP Location: Left arm, Patient Position: Sitting, Cuff Size: Adult Large)   Pulse 98   Temp 96.8  F (36  C) (Tympanic)   Resp 12   Ht 1.803 m (5' 11\")   Wt 87.1 kg (192 lb)   SpO2 95%   BMI 26.78 kg/m    Body mass index is 26.78 kg/m .  Physical Exam   GENERAL: healthy, alert and no distress  EYES: Eyes grossly normal to inspection and conjunctivae and sclerae normal  HENT: normal cephalic/atraumatic and oral mucous membranes moist  RESP: lungs clear to auscultation - no rales, rhonchi or wheezes  CV: regular rate and rhythm, normal S1 S2, no S3 or S4, no murmur, click or rub, no peripheral edema and peripheral pulses strong  ABDOMEN: Distended, soft, positive fluid wave and non-tender.  MS: Pitting edema, 3+ on both lower extremities.  NEURO: Normal strength and tone, mentation intact and speech normal  PSYCH: mentation appears normal, " affect normal/bright    Diagnostic Test Results:  Labs reviewed in Epic        Assessment & Plan     1. Alcoholic cirrhosis of liver with ascites (H)    - spironolactone (ALDACTONE) 50 MG tablet; Take 1 tablet (50 mg) by mouth daily Take with Furosemide.  Dispense: 30 tablet; Refill: 5  - furosemide (LASIX) 40 MG tablet; Take 1 tablet (40 mg) by mouth daily  Dispense: 30 tablet; Refill: 5    2. Chronic systolic heart failure (H)    - spironolactone (ALDACTONE) 50 MG tablet; Take 1 tablet (50 mg) by mouth daily Take with Furosemide.  Dispense: 30 tablet; Refill: 5  - furosemide (LASIX) 40 MG tablet; Take 1 tablet (40 mg) by mouth daily  Dispense: 30 tablet; Refill: 5    3. Impaired mobility and activities of daily living  Recommend power wheelchair due to difficulty ambulating secondary to exertional dyspnea (heart failure), exacerbated by ascites (alcoholic cirrhosis).        Return in about 4 days (around 3/17/2020).    Frank Childs MD  [facility]

## 2020-03-13 NOTE — PATIENT INSTRUCTIONS
At [facility], we strive to deliver an exceptional experience to you, every time we see you.  If you receive a survey in the mail, please send us back your thoughts. We really do value your feedback.    Based on your medical history, these are the current health maintenance/preventive care services that you are due for (some may have been done at this visit.)  Health Maintenance Due   Topic Date Due     PREVENTIVE CARE VISIT  1965     HF ACTION PLAN  1965     TSH W/FREE T4 REFLEX  1965     ADVANCE CARE PLANNING  1965     DEPRESSION ACTION PLAN  1965     PNEUMOCOCCAL IMMUNIZATION 19-64 MEDIUM RISK (1 of 1 - PPSV23) 09/05/1983     ZOSTER IMMUNIZATION (1 of 2) 09/05/2014     LIPID  06/07/2019     INFLUENZA VACCINE (1) 09/01/2019     BMP  11/13/2019     COLORECTAL CANCER SCREENING  04/04/2020         Suggested websites for health information:  Www.[facility], [facility].org : Up to date and easily searchable information on multiple topics.  Www.medlineplus.gov : medication info, interactive tutorials, watch real surgeries online  Www.familydoctor.org : good info from the Academy of Family Physicians  Www.cdc.gov : public health info, travel advisories, epidemics (H1N1)  Www.aap.org : children's health info, normal development, vaccinations  Www.health.[facility].[place].us : [place] dept of health, public health issues in [place], N1N1    Your care team:                            Family Medicine Internal Medicine   MD Frank Barkley MD Shantel Branch-Fleming, MD Katya Georgiev PA-C Nam Ho, MD Pediatrics   CHRISTY Aranda CNP Amelia Massimini APRN CNP Shaista Malik, MD Bethany Templen, MD Deborah Mielke, MD Kim Thein, APRN CNP      Clinic hours: Monday - Thursday 7 am-7 pm; Fridays 7 am-5 pm.   Urgent care: Monday - Friday 11 am-9 pm; Saturday and Sunday 9 am-5 pm.  Pharmacy : Monday -Thursday 8 am-8 pm; Friday 8 am-6 pm; Saturday and Sunday 9 am-5 pm.      Clinic: [phone]   Pharmacy: [phone]

## 2020-03-16 ENCOUNTER — TELEPHONE (OUTPATIENT)
Dept: FAMILY MEDICINE | Facility: CLINIC | Age: 55
End: 2020-03-16

## 2020-03-16 DIAGNOSIS — I50.22 CHRONIC SYSTOLIC HEART FAILURE (H): ICD-10-CM

## 2020-03-16 DIAGNOSIS — Z78.9 IMPAIRED MOBILITY AND ACTIVITIES OF DAILY LIVING: ICD-10-CM

## 2020-03-16 DIAGNOSIS — Z74.09 IMPAIRED MOBILITY AND ACTIVITIES OF DAILY LIVING: ICD-10-CM

## 2020-03-16 NOTE — TELEPHONE ENCOUNTER
Reason for Call:  Other Order     Detailed comments: Pt would like to request a referral for a power wheelchair to the below company, as the first company would not accept his insurance. Thank you.  Pt did not have any more info other than the name.    [facility]     Phone Number Patient can be reached at: Cell number on file:    Telephone Information:   Mobile [phone]       Best Time: Any    Can we leave a detailed message on this number? NO    Call taken on 3/16/2020 at 1:22 PM by Desi Rivera

## 2020-03-17 NOTE — TELEPHONE ENCOUNTER
Faxing Rx DME for the Power Wheelchair to [facility], [phone], right fax confirmed at 2:10 pm today, 3/17/2020.  Samira Johnson MA  [facility]  2nd Floor  Primary Care

## 2020-03-25 ENCOUNTER — PATIENT OUTREACH (OUTPATIENT)
Dept: CARE COORDINATION | Facility: CLINIC | Age: 55
End: 2020-03-25

## 2020-03-25 NOTE — PROGRESS NOTES
Clinic Care Coordination Contact    Follow Up Progress Note      Assessment: RN CC spoke with patient for follow up.  Patient reported not feeling like talking at beginning of conversation. Patient unwilling to schedule f/u from 3/13/20 office visit or paracentesis.  Patient reporting his abdomen is increasing in bloating.  Patient states he has not had paracentesis since his TIPS procedure.  Patient states his hepatologist saw him and informed him he would need to schedule paracentesis intermittently for the first few months post TIPS.  Patient rating pain 9/10, but is able to carry on full conversations.  Patient was able to talk with writer about past work history, family and concerns with current covid-19 pandemic.  Patient agreeable at the end of conversation to schedule a paracentesis and was open to a call from writer at the end of the week and would then be open to scheduling a f/u with PCP.  Patient is living with his son, states he has all of his medications and has plenty of food during the current pandemic.    Goals addressed this encounter:   Goals Addressed                 This Visit's Progress      1. Improve chronic symptoms (pt-stated)   10%     Goal Statement: I want to understand my liver disease.  Date Goal set: 2/5/2020   Barriers: complex diagnosis  Strengths: care coordination  Date to Achieve By: 5/5/20  Patient expressed understanding of goal: yes  Action steps to achieve this goal:  1. I will schedule a paracentesis.  2. I will have a liver ultrasound April 6th at [facility].           2. Medical (pt-stated)   Not on track     Goal Statement: I want to have umbilical hernia surgery.  Date Goal set: 2/5/2020   Barriers: ascites, undergoing paracentesis, covid-19 precautions  Strengths: care coordination  Date to Achieve By: 7/25/20  Patient expressed understanding of goal: yes  Action steps to achieve this goal:  1. I will ask my GI and/or PCP for a surgery referral                   Intervention/Education provided during outreach: RN CC advised patient to call to schedule paracentesis as advised by hepatologist and to schedule a f/u with PCP.     Outreach Frequency: 2 weeks    Plan:   1. Patient will schedule paracentesis.  2. Patient will schedule PCP follow up appointment.  3. RN Care Coordinator will follow up in 3 days.    Melissa Behl BSN, RN, PHN, [facility]  Primary Care Clinical RN Care Coordinator  [facility]   [phone]

## 2020-03-27 NOTE — PROGRESS NOTES
"Clinic Care Coordination Contact    Follow Up Progress Note      Assessment: RN ANAHI spoke with patient for follow up.  Patient reports no change in symptoms.  He has not scheduled a paracentesis or f/u with PCP.  Patient admitted to writer today to not taking his medications for the past week, \"I thought maybe they were making me worse.\"  RN CC educated patient on importance of taking medications as directed and purpose/rationale of his medications.  Patient verbalized understanding and stated he would begin taking his medications today.  Patient informed writer when scheduling a f/u PCP appointment that he has a \"chronic smoker's cough.\"    Goals addressed this encounter:   Goals Addressed                 This Visit's Progress      1. Improve chronic symptoms (pt-stated)   10%     Goal Statement: I want to understand my liver disease.  Date Goal set: 2/5/2020   Barriers: complex diagnosis  Strengths: care coordination  Date to Achieve By: 5/5/20  Patient expressed understanding of goal: yes  Action steps to achieve this goal:  1. I will schedule a paracentesis.  2. I will have a liver ultrasound April 6th at [facility].           2. Taking Medication        Goal Statement: I will take my medications as directed.  Date Goal set: 3/27/2020   Barriers: not feeling well  Strengths: care coordination  Date to Achieve By: 5/27/20  Patient expressed understanding of goal: yes  Action steps to achieve this goal:  1. I will take my medications every day as directed.          3. Medical (pt-stated)   Not on track     Goal Statement: I want to have umbilical hernia surgery.  Date Goal set: 2/5/2020   Barriers: ascites, undergoing paracentesis, covid-19 precautions  Strengths: care coordination  Date to Achieve By: 7/25/20  Patient expressed understanding of goal: yes  Action steps to achieve this goal:  1. I will ask my GI and/or PCP for a surgery referral                  Intervention/Education provided during outreach: " RN CC educated patient on importance of medication adherence, purpose and rationale of medications related to his chronic diseases.  RN CC assisted patient in scheduling a virtual PCP f/u appointment for 3/31/20.  FYI to PCP that patient had stopped his medications.     Outreach Frequency: 2 weeks    Plan:   1. Patient will take medications as prescribed.  2. Patient will attend virtual PCP appointment 3/31/20.  3. RN Care Coordinator will follow up in 1-2 weeks.    Melissa Behl BSN, RN, PHN, [facility]  Primary Care Clinical RN Care Coordinator  [facility]   [phone]

## 2020-03-30 ENCOUNTER — PATIENT OUTREACH (OUTPATIENT)
Dept: CARE COORDINATION | Facility: CLINIC | Age: 55
End: 2020-03-30

## 2020-03-30 NOTE — PROGRESS NOTES
Clinic Care Coordination Contact    Follow Up Progress Note      Assessment: Patient contacted writer to report worsening symptoms.  Patient reports his abdomen is increased in bloating and for the past day he has had pale yellow liquid draining from his umbilicus.  Patient reports severe bilateral lower extremity edema and states he is barely able to walk at this time.  Patient states he has been taking his medications as prescribed since last contact with writer.  Patient has not scheduled a paracentesis.  RN CC advised patient to seek emergency care due to abdominal skin weeping fluid and patient reported severe BLE edema.  Patient agreeable towards the end of conversation and agreed to call 911 if unable to secure transportation.    Goals addressed this encounter:   Goals Addressed                 This Visit's Progress      1. Improve chronic symptoms (pt-stated)   10%     Goal Statement: I want to understand my liver disease.  Date Goal set: 2/5/2020   Barriers: complex diagnosis  Strengths: care coordination  Date to Achieve By: 5/5/20  Patient expressed understanding of goal: yes  Action steps to achieve this goal:  1. I will schedule a paracentesis.  2. I will have a liver ultrasound April 6th at [facility].  3. I will seek emergency care 3/30/20           2. Taking Medication   20%     Goal Statement: I will take my medications as directed.  Date Goal set: 3/27/2020   Barriers: not feeling well  Strengths: care coordination  Date to Achieve By: 5/27/20  Patient expressed understanding of goal: yes  Action steps to achieve this goal:  1. I will take my medications every day as directed.          3. Medical (pt-stated)   Not on track     Goal Statement: I want to have umbilical hernia surgery.  Date Goal set: 2/5/2020   Barriers: ascites, undergoing paracentesis, covid-19 precautions  Strengths: care coordination  Date to Achieve By: 7/25/20  Patient expressed understanding of goal: yes  Action steps to  achieve this goal:  1. I will ask my GI and/or PCP for a surgery referral                  Intervention/Education provided during outreach: RN CC advised patient to seek emergency care to severity of symptoms.     Outreach Frequency: twice weekly    Plan:   1. Patient will seek emergency care.  2. RN CC will monitor for patient admission/disposition.      Melissa Behl BSN, RN, PHN, CCM  Primary Care Clinical RN Care Coordinator  [facility]   [phone]

## 2020-04-02 ENCOUNTER — PATIENT OUTREACH (OUTPATIENT)
Dept: CARE COORDINATION | Facility: CLINIC | Age: 55
End: 2020-04-02

## 2020-04-02 NOTE — PROGRESS NOTES
Clinic Care Coordination Contact  Care Team Conversations    RN CC received a voicemail from [facility] MEGAN May [phone] with concerns regarding patient's understanding of appointments, follow up and medications.  RN CC returned May's call, however, there was no answer.  RN CC left a message for May requesting a call back.  Would recommend home care for patient to assist with hospital recovery and medication management and education.    Melissa Behl BSN, RN, PHN, CCM  Primary Care Clinical RN Care Coordinator  [facility]   [phone]

## 2020-04-03 NOTE — PROGRESS NOTES
Clinic Care Coordination Contact  Care Team Conversations    RN CC received call back from May GUZMAN from [employer] [employer] ([phone]).  Patient will be discharged home today.  PT recommended TCU, however, patient declines.  Patient will be discharged home with [facility].  RN CC will follow up with patient on 4/6/20.    Melissa Behl BSN, RN, PHN, CCM  Primary Care Clinical RN Care Coordinator  [facility]   [phone]

## 2020-04-06 ENCOUNTER — PATIENT OUTREACH (OUTPATIENT)
Dept: CARE COORDINATION | Facility: CLINIC | Age: 55
End: 2020-04-06

## 2020-04-06 DIAGNOSIS — I42.6 CARDIOMYOPATHY, ALCOHOLIC (H): Primary | ICD-10-CM

## 2020-04-06 DIAGNOSIS — K26.9 DUODENAL ULCER WITHOUT HEMORRHAGE OR PERFORATION AND WITHOUT OBSTRUCTION: ICD-10-CM

## 2020-04-06 DIAGNOSIS — K70.31 ALCOHOLIC CIRRHOSIS OF LIVER WITH ASCITES (H): ICD-10-CM

## 2020-04-06 DIAGNOSIS — K22.10 ULCER OF ESOPHAGUS WITHOUT BLEEDING: ICD-10-CM

## 2020-04-06 DIAGNOSIS — G62.1 ALCOHOLIC POLYNEUROPATHY (H): ICD-10-CM

## 2020-04-06 DIAGNOSIS — I50.22 CHRONIC SYSTOLIC CONGESTIVE HEART FAILURE (H): ICD-10-CM

## 2020-04-06 DIAGNOSIS — I50.22 CHRONIC SYSTOLIC HEART FAILURE (H): ICD-10-CM

## 2020-04-06 DIAGNOSIS — R39.9 LOWER URINARY TRACT SYMPTOMS (LUTS): ICD-10-CM

## 2020-04-06 RX ORDER — LACTULOSE 10 G/15ML
20 SOLUTION ORAL
COMMUNITY
Start: 2020-04-03 | End: 2020-05-03

## 2020-04-06 RX ORDER — OXYCODONE HYDROCHLORIDE 5 MG/1
5 TABLET ORAL 3 TIMES DAILY PRN
COMMUNITY
Start: 2020-04-03 | End: 2020-04-24

## 2020-04-06 ASSESSMENT — ACTIVITIES OF DAILY LIVING (ADL): DEPENDENT_IADLS:: TRANSPORTATION;CLEANING;LAUNDRY

## 2020-04-06 NOTE — TELEPHONE ENCOUNTER
Patient was inpatient at [facility] 3/30/20-4/3/20 for umbilical hernia s/p repair, alcoholic cirrhosis of liver with ascites.  Hospital follow up scheduled for 4/14/20.    Patient states he was discharged on medications without prescriptions and needs prescriptions sent to his pharmacy:  Oxycodone  Pantoprazole  Furosemide  Rifaximin  Certavite  Spironolactone  tamsulosin    Please advise.    Melissa Behl BSN, RN, PHN, CCM  Primary Care Clinical RN Care Coordinator  [facility]   [phone]

## 2020-04-06 NOTE — PROGRESS NOTES
Clinic Care Coordination Contact  [facility]/Voicemail    Referral Source: PCP  Clinical Data: Care Coordinator Outreach  Outreach attempted x 1. No answer and voicemail was full.  Plan: Care Coordinator will try to reach patient again in 1-2 business days.    Melissa Behl BSN, RN, PHN, [facility]  Primary Care Clinical RN Care Coordinator  [facility]   [phone]

## 2020-04-06 NOTE — TELEPHONE ENCOUNTER
Team-Please contact patient and let him know that furosemide (LASIX) 40 MG tablet & spironolactone (ALDACTONE) 50 MG tablet were sent to his pharmacy on 3/13/20 with 5 refills, this should be enough to last until Sept. 2020      Provider please address the following:   Routing refill request to provider for review/approval because: pantoprazole (PROTONIX) 40 MG EC tablet &  tamsulosin (FLOMAX) 0.4 MG capsule  A break in medication    Routing refill request to provider for review/approval because: rifaximin (XIFAXAN) 550 MG TABS tablet & Centrovite  Medication is reported/historical    Routing refill request to provider for review/approval because: Oxycodone  Drug not on the [facility] refill protocol   Controlled Substance Refill Request for Oxycodone  Problem List Complete:  No     PROVIDER TO CONSIDER COMPLETION OF PROBLEM LIST AND OVERVIEW/CONTROLLED SUBSTANCE AGREEMENT    Last Written Prescription Date:  3/11/20  Last Fill Quantity: 8,   # refills: 0  Last Office Visit with [facility] primary care provider: 3/13/20    Future Office visit:   Next 5 appointments (look out 90 days)    Apr 14, 2020  4:40 PM CDT  Telephone Visit with [facility]s [facility]  [facility] [address] [place] [id number] [phone]          Controlled substance agreement:   Encounter-Level CSA:    There are no encounter-level csa.     Patient-Level CSA:    There are no patient-level csa.         Last Urine Drug Screen: No results found for: CDAUT, No results found for: COMDAT, No results found for: THC13, PCP13, COC13, MAMP13, OPI13, AMP13, BZO13, TCA13, MTD13, BAR13, OXY13, PPX13, BUP13     [email or web address]       checked in past 3 months? Problem list not complete      Siena Varner RN

## 2020-04-06 NOTE — PROGRESS NOTES
Clinic Care Coordination Contact    Clinic Care Coordination Contact  OUTREACH    Referral Information:  Referral Source: PCP    Primary Diagnosis: GI Disorders    Chief Complaint   Patient presents with     Clinic Care Coordination - [facility] Hospital     RN        Universal Utilization: Patient utilizes [facility].  Patient has had 2 admissions and 1 ED visit in the past 90 days.  Patient is followed by hepatology through [facility]  Clinic Utilization  Difficulty keeping appointments:: Yes  Compliance Concerns: Yes  No-Show Concerns: Yes  No PCP office visit in Past Year: No  Utilization    Last refreshed: 4/6/2020  4:03 PM:  Hospital Admissions 0           Last refreshed: 4/6/2020  4:03 PM:  ED Visits 0           Last refreshed: 4/6/2020  4:03 PM:  No Show Count (past year) 5              Current as of: 4/6/2020  4:03 PM              Clinical Concerns:  Current Medical Concerns:  Patient was inpatient at [facility] 3/30/20-4/3/20 for umbilical hernia repair, alcoholic cirrhosis of liver with ascites, GERD, hypokalemia, alcoholic intoxication with complication, alcohol withdrawal.  Patient reports being discharged without medications.  See 4/6/20 refill encounter sent to PCP.  Patient reports surgical pain 9/10.  See below for hypertension assessment.  Patient states he has not been contacted by home care yet.  RN CC offered to provide patient with the phone number to home care, however, patient declined.  Patient denies any sign/sympmtoms of infection of his surgical incision.  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  [facility] provided patient with a CD consult and provided patient with resources.      Education Provided to patient: RN CC educated patient on discharge instructions, reviewed importance of obtaining and taking medications as prescribed, reviewed importance of home care services for patient, reviewed plan of care.     Pain  Pain (GOAL):: Yes  Type: Acute (<3mo)  Location of chronic pain:: abdomen   Radiating: No  Progression: Unchanged  Description of pain: Stabbing  Chronic pain severity:: 9  Limitation of routine activities due to chronic pain:: Yes  Description: Unable to perform most daily activities (chores, hobbies, social activities, driving)  Alleviating Factors: Rest, Pain Medication  Aggravating Factors: Activity  Health Maintenance Reviewed: Due/Overdue   Health Maintenance Due   Topic Date Due     PREVENTIVE CARE VISIT  1965     HF ACTION PLAN  1965     TSH W/FREE T4 REFLEX  1965     ADVANCE CARE PLANNING  1965     DEPRESSION ACTION PLAN  1965     PNEUMOCOCCAL IMMUNIZATION 19-64 MEDIUM RISK (1 of 1 - PPSV23) 09/05/1983     ZOSTER IMMUNIZATION (1 of 2) 09/05/2014     LIPID  06/07/2019     INFLUENZA VACCINE (1) 09/01/2019     BMP  11/13/2019     COLORECTAL CANCER SCREENING  04/04/2020      Clinical Pathway: Clinic Care Coordination Hypertension Assessment    Discharge:  Hospital summary: See above  Day of hospital discharge: 4/3/20  What recommendations were made for follow up after your recent hospitalization? \"Take my medicine, I don't know\"  Have the follow up appointments been scheduled? No  If not, can I help you set up these appointments? Yes and writer assisted patient in " "scheduling a virtual f/u with PCP for 4/14/20     Do you have after-hour contact numbers for your providers? yes     Hypertension:                      Symptom Review:  Hypertension Symptoms  Anxiety: No  Blurred vision: No  Chest pain: : No  Cough: No  Numbness and Tingling in hands or feet: No  Weakness: Yes  Dizziness or Light-Headedness: No  Fatigue: Yes  Headaches: No  Neck pain: Yes(chronic neck pain)  Orthopnea: No  Palpitations: No  Fluid retention:: Yes  Location: : legs(feet to calves)  Shortness of breath: No  Nosebleeds: No  Sweats: No  Home BP monitoring: : No  Overall your hypertension symptoms are (GOAL):: Stable    Medications:   \"How many new medications are you on since your hospitalization?\"           2 or more -- [facility] MTM referral needed  \"How many of your current medicines changed (dose, timing, name, etc.) while you were in the hospital?\" Patient does not know -- Epic MTM referral needed  \"Do you have questions about your medications?\" No  For patients on insulin: \"Did you start on insulin in the hospital or did you have your insulin dose changed?\"No  Medication reconciliation completed? Yes    Activity:  Patient currently is not engaged in exercise:   Patient is not able to perform ADLS/IADLS without assistance.  Patient referred to Home Care by [facility].    Diet:  Reviewed well balanced diet with consideration to medical conditions/limitations.   Reviewed low sodium diet guidelines appropriate for patient.    Referred to NONE.    Emotions:  Patient does have adequate support.  Patient is not feeling down or depressed.    Medication Management:  Patient needing medications as prescribed upon hospital discharge.  Medication reconciliation complete per discharge list with patient.  Patient will  medications once filled by provider.  RN CC advised patient to have home care assist with medication set up.  MTM referral placed due to patient's need for assistance related to complexity " of medication management.    Functional Status:  Dependent ADLs:: Wheelchair-independent  Dependent IADLs:: Transportation, Cleaning, Laundry  Bed or wheelchair confined:: Yes  Mobility Status: Independent w/Device  Fallen 2 or more times in the past year?: No  Any fall with injury in the past year?: No  Patient's CADI  is Itzel Chiu ([phone])  Patient receives 1/2 hour of PCA services per day as well as homemaking.    Living Situation:  Current living arrangement:: I live alone  Type of residence:: Town home    Lifestyle & Psychosocial Needs:  Lifestyle     Physical activity     Days per week: 0 days     Minutes per session: 0 min     Stress: Not on file     Social Needs     Financial resource strain: Not very hard     Food insecurity     Worry: Never true     Inability: Never true     Transportation needs     Medical: No     Non-medical: No     Diet:: No added salt, Low saturated fat  Inadequate nutrition (GOAL):: No  Tube Feeding: No  Inadequate activity/exercise (GOAL):: No  Significant changes in sleep pattern (GOAL): No  Transportation means:: Medical transport, Public transportation, Family, Friend     Mosque or spiritual beliefs that impact treatment:: No  Mental health DX:: No  Mental health management concern (GOAL):: No  Informal Support system:: Parent, Family, Friends, Children   Socioeconomic History     Marital status: Single     Spouse name: Not on file     Number of children: Not on file     Years of education: Not on file     Highest education level: Not on file     Tobacco Use     Smoking status: Current Every Day Smoker     Packs/day: 1.00     Smokeless tobacco: Never Used     Tobacco comment: it relaxes him when he is trying to sleep   Substance and Sexual Activity     Alcohol use: Yes     Frequency: 2-4 times a month     Drinks per session: 1 or 2     Binge frequency: Less than monthly     Comment: helps him sleep     Drug use: No     Sexual activity: Yes     Partners:  Female        Resources and Interventions:  Current Resources:      Community Resources: PCA, County Worker, Home Care(Pt's daughter is his PCA)  Supplies used at home:: None  Equipment Currently Used at Home: wheelchair, power    Advance Care Plan/Directive  Advanced Care Plans/Directives on file:: No  Advanced Care Plan/Directive Status: Considering Options    Referrals Placed: MT     Goals:   Goals        General    1. Improve chronic symptoms (pt-stated)     Notes - Note edited  4/6/2020  4:27 PM by Behl, Melissa K, RN    Goal Statement: I want to understand my liver disease.  Date Goal set: 2/5/2020   Barriers: complex diagnosis  Strengths: care coordination  Date to Achieve By: 7/5/20  Patient expressed understanding of goal: yes  Action steps to achieve this goal:  1. I will have a liver ultrasound April 6th at [facility].  2. I will seek emergency care 3/30/20 (completed)  3. I will follow up with Dr. Childs 4/14/20         2. Taking Medication     Notes - Note edited  4/6/2020  4:28 PM by Behl, Melissa K, RN    Goal Statement: I will take my medications as directed.  Date Goal set: 3/27/2020   Barriers: not feeling well  Strengths: care coordination  Date to Achieve By: 5/27/20  Patient expressed understanding of goal: yes  Action steps to achieve this goal:  1. I will take my medications every day as directed.  2. I will  my prescriptions from the pharmacy.              Patient/Caregiver understanding: Patient has poor to fair understanding of plan of care and hospital discharge instructions and often needs items repeated.    Outreach Frequency: weekly  Future Appointments              In 1 week Frank Childs MD [facility]          Plan:   1. Patient will  medications and take as prescribed.  2. Patient will follow up with PCP as scheduled.  3. Patient will follow up with hepatology as advised.  4. Patient will have home care services  initiated.  5. RN CC placed MTM referral.  6. RN CC will follow up with patient in 1 week.    Melissa Behl BSN, RN, PHN, [facility]  Primary Care Clinical RN Care Coordinator  [facility]   [phone]

## 2020-04-06 NOTE — LETTER
[facility] CARE COORDINATION  [address] [address]   April 6, 2020        Marlon Mackey  [address] [facility] [address]          Dear Trino,     It was good to talk to you today.  I hope you recover quickly.  Attached is an updated Complex Care Plan for your continued enrollment in Care Coordination. Please let us know if you have additional questions, concerns, or goals that we can assist with.    Sincerely,    Melissa Behl BSN, RN, PHN, CCM  Primary Care Clinical RN Care Coordinator  [facility]   [phone]                                                    [facility]  Complex Care Plan  About Me:    Patient Name:  Marlon Mackey    YOB: 1965  Age:         54 year old   [place] MRN:    [id number] Telephone Information:  Home Phone [phone]   Mobile [phone]       Address:  [address] [facility] [address] Email address:  No e-mail address on record      Emergency Contact(s)    Name Relationship Lgl Grd Work Phone Home Phone Mobile Phone   1. ESVIN MACKEY Mother   [phone]    2. DECLINE, PER PT Other   None            Primary language:  English     needed? No   [employer]:  [phone] op. 1  Other communication barriers: Physical impairment  Preferred Method of Communication:  Mail  Current living arrangement: I live with family  Mobility Status/ Medical Equipment: Independent w/Device    Health Maintenance  Health Maintenance Reviewed: Due/Overdue   Health Maintenance Due   Topic Date Due     PREVENTIVE CARE VISIT  1965     HF ACTION PLAN  1965     TSH W/FREE T4 REFLEX  1964     ADVANCE CARE PLANNING  1965     DEPRESSION ACTION PLAN  1965     PNEUMOCOCCAL IMMUNIZATION 19-64 MEDIUM RISK (1 of 1 - PPSV23) 09/05/1983     ZOSTER IMMUNIZATION (1 of 2) 09/05/2014      LIPID  06/07/2019     INFLUENZA VACCINE (1) 09/01/2019     BMP  11/13/2019     COLORECTAL CANCER SCREENING  04/04/2020        My Access Plan  Medical Emergency 911   Primary Clinic Line [facility] - [phone]   24 Hour Appointment Line [phone] or  [phone]) (toll-free)   24 Hour Nurse Line [phone] (toll-free)   Preferred Urgent Care [facility] [address], [phone]   Preferred [facility] [place]  [phone]   Preferred Pharmacy [facility] [place], [place] - [address]     Behavioral Health Crisis Line The National Suicide Prevention Lifeline at 1-102.539.2065 or 911             My Care Team Members  Patient Care Team       Relationship Specialty Notifications Start End    Vocal, Frank Haddad MD PCP - General Internal Medicine  10/14/15     Phone: [phone] Fax: [phone] [address] [facility] [address]    Robert Rudolph MD Physician Gastroenterology  7/10/19     Phone: [phone] Fax: [phone]         [facility] [address] [address] [facility] [address]    Behl, Melissa K, RN Lead Care Coordinator Primary Care - CC Admissions 2/5/20     Phone: [phone] Fax: [phone]        Maximiliano Jean Baptiste MD MD Gastroenterology  2/5/20     Phone: [phone] Fax: [phone]         [facility] [address] [facility] [address]    Elizabeth Tang APRN CNP Assigned PCP   2/23/20     Phone: [phone] Fax: [phone]         [address] [facility] [address]            My Care Plans  Self Management and Treatment Plan  Goals and (Comments)  Goals        General    1. Improve chronic symptoms (pt-stated)     Notes - Note edited  4/6/2020  4:27 PM by Behl, Melissa K, RN    Goal Statement: I want to understand my liver disease.  Date Goal set: 2/5/2020   Barriers: complex diagnosis  Strengths: care  coordination  Date to Achieve By: 7/5/20  Patient expressed understanding of goal: yes  Action steps to achieve this goal:  1. I will have a liver ultrasound April 6th at [facility].  2. I will seek emergency care 3/30/20 (completed)  3. I will follow up with Dr. Childs 4/14/20         2. Taking Medication     Notes - Note edited  4/6/2020  4:28 PM by Behl, Melissa K, RN    Goal Statement: I will take my medications as directed.  Date Goal set: 3/27/2020   Barriers: not feeling well  Strengths: care coordination  Date to Achieve By: 5/27/20  Patient expressed understanding of goal: yes  Action steps to achieve this goal:  1. I will take my medications every day as directed.  2. I will  my prescriptions from the pharmacy.               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    Care Coordination Start Date: 2/5/2020   Frequency of Care Coordination: weekly   Form Last Updated: 04/06/2020

## 2020-04-06 NOTE — PROGRESS NOTES
Clinic Care Coordination Contact  Care Team Conversations    RN CC contacted [facility] [phone] to determine start of care date and RN Case Manager following [facility] hospitalization 3/30/20-4/3/20 for umbilical hernia without obstruction or gangrene.  Home care representative stated patient has not been assigned and did not have a start of care scheduled yet and advised for writer to call back at a later time.    Noted in hospital discharge notes patient was scheduled for a f/u TIPS ultrasound this morning, so will call later this afternoon for hospital f/u call.    Melissa Behl BSN, RN, PHN, CCM  Primary Care Clinical RN Care Coordinator  [facility]   [phone]

## 2020-04-06 NOTE — TELEPHONE ENCOUNTER
"Requested Prescriptions   Pending Prescriptions Disp Refills     furosemide (LASIX) 40 MG tablet  Last Written Prescription Date:  03/13/2020  Last Fill Quantity: 30,  # refills: 5   Last Office Visit with CONRAD, PRASAD or  Health prescribing provider:  03/13/2020-Amadeo   Future Office Visit:    Next 5 appointments (look out 90 days)    Apr 14, 2020  4:40 PM CDT  Telephone Visit with Frank Childs MD  [facility] [address] [place] [id number] [phone]        30 tablet 5     Sig: Take 1 tablet (40 mg) by mouth daily       Diuretics (Including Combos) Protocol Passed - 4/6/2020  4:25 PM        Passed - Blood pressure under 140/90 in past 12 months     BP Readings from Last 3 Encounters:   03/13/20 92/59   03/11/20 (!) 89/51   02/14/20 122/79                 Passed - Recent (12 mo) or future (30 days) visit within the authorizing provider's specialty     Patient has had an office visit with the authorizing provider or a provider within the authorizing providers department within the previous 12 mos or has a future within next 30 days. See \"Patient Info\" tab in inbasket, or \"Choose Columns\" in Meds & Orders section of the refill encounter.              Passed - Medication is active on med list        Passed - Patient is age 18 or older        Passed - Normal serum creatinine on file in past 12 months     Recent Labs   Lab Test 05/13/19  1525   CR 1.14              Passed - Normal serum potassium on file in past 12 months     Recent Labs   Lab Test 05/13/19  1525   POTASSIUM 4.0                    Passed - Normal serum sodium on file in past 12 months     Recent Labs   Lab Test 05/13/19  1525                    Multiple Vitamins-Minerals (CENTROVITE) TABS  Last Written Prescription Date:  06/10/19  Last Fill Quantity: 90,  # refills: 1   Last Office Visit with CONRAD, PRASAD or [facility] prescribing provider:  03/13/2020-Amadeo   Future " "Office Visit:    Next 5 appointments (look out 90 days)    Apr 14, 2020  4:40 PM CDT  Telephone Visit with Frank Childs MD  [facility] [address] [facility] [phone]        90 tablet      Sig: Take 1 tablet by mouth daily       Vitamin Supplements (Adult) Protocol Passed - 4/6/2020  4:25 PM        Passed - High dose Vitamin D not ordered        Passed - Recent (12 mo) or future (30 days) visit within the authorizing provider's specialty     Patient has had an office visit with the authorizing provider or a provider within the authorizing providers department within the previous 12 mos or has a future within next 30 days. See \"Patient Info\" tab in inbasket, or \"Choose Columns\" in Meds & Orders section of the refill encounter.              Passed - Medication is active on med list           oxyCODONE (ROXICODONE) 5 MG tablet        Last Written Prescription Date:  03/11/2020  Last Fill Quantity: 8,   # refills: 0  Last Office Visit: 03/13/2020  Future Office visit:    Next 5 appointments (look out 90 days)    Apr 14, 2020  4:40 PM CDT  Telephone Visit with Frank Childs MD  [facility] [address] [place] [id number] [phone]           Routing refill request to provider for review/approval because:  Drug not on the [facility] or [facility] refill protocol or controlled substance       Sig: Take 1 tablet (5 mg) by mouth 3 times daily as needed For severe breakthrough pain >7/10       There is no refill protocol information for this order        pantoprazole (PROTONIX) 40 MG EC tablet  Last Written Prescription Date:  06/10/19  Last Fill Quantity: 90,  # refills: 1   Last Office Visit with [facility], [facility] or [facility] prescribing provider:  03/13/2020   Future Office Visit:    Next 5 appointments (look out 90 days)    Apr 14, 2020  4:40 PM " "CDT  [facility]phone [facility] [address] [facility] [phone]        90 tablet 1     Sig: Take 1 tablet (40 mg) by mouth daily       PPI Protocol Passed - 4/6/2020  4:25 PM        Passed - Not on Clopidogrel (unless Pantoprazole ordered)        Passed - No diagnosis of osteoporosis on record        Passed - Recent (12 mo) or future (30 days) visit within the authorizing provider's specialty     Patient has had an office visit with the authorizing provider or a provider within the authorizing providers department within the previous 12 mos or has a future within next 30 days. See \"Patient Info\" tab in inbasket, or \"Choose Columns\" in Meds & Orders section of the refill encounter.              Passed - Medication is active on med list        Passed - Patient is age 18 or older           tamsulosin (FLOMAX) 0.4 MG capsule  Last Written Prescription Date:  06/10/19  Last Fill Quantity: 90,  # refills: 1   Last Office Visit with G, P or [facility] prescribing provider:  03/13/2020   Future Office Visit:    Next 5 appointments (look out 90 days)    Apr 14, 2020  4:40 PM CDT  Telephone Visit with [facility] [address] [facility] [phone] [phone]        90 capsule 1     Sig: Take 1 capsule (0.4 mg) by mouth daily       Alpha Blockers Passed - 4/6/2020  4:25 PM        Passed - Blood pressure under 140/90 in past 12 months     BP Readings from Last 3 Encounters:   03/13/20 92/59   03/11/20 (!) 89/51   02/14/20 122/79                 Passed - Recent (12 mo) or future (30 days) visit within the authorizing provider's specialty     Patient has had an office visit with the authorizing provider or a provider within the authorizing providers department within the previous 12 mos or has a future within next " "30 days. See \"Patient Info\" tab in inbasket, or \"Choose Columns\" in Meds & Orders section of the refill encounter.              Passed - Patient does not have Tadalafil, Vardenafil, or Sildenafil on their medication list        Passed - Medication is active on med list        Passed - Patient is 18 years of age or older           spironolactone (ALDACTONE) 50 MG tablet  Last Written Prescription Date:  03/13/2020  Last Fill Quantity: 30,  # refills: 5   Last Office Visit with G, P or [facility] prescribing provider:  03/13/2020   Future Office Visit:    Next 5 appointments (look out 90 days)    Apr 14, 2020  4:40 PM CDT  Telephone Visit with Frank Childs MD  [facility] ([facility]) [address] [place] [phone]        30 tablet 5     Sig: Take 1 tablet (50 mg) by mouth daily Take with Furosemide.       Diuretics (Including Combos) Protocol Passed - 4/6/2020  4:25 PM        Passed - Blood pressure under 140/90 in past 12 months     BP Readings from Last 3 Encounters:   03/13/20 92/59   03/11/20 (!) 89/51   02/14/20 122/79                 Passed - Recent (12 mo) or future (30 days) visit within the authorizing provider's specialty     Patient has had an office visit with the authorizing provider or a provider within the authorizing providers department within the previous 12 mos or has a future within next 30 days. See \"Patient Info\" tab in inbasket, or \"Choose Columns\" in Meds & Orders section of the refill encounter.              Passed - Medication is active on med list        Passed - Patient is age 18 or older        Passed - Normal serum creatinine on file in past 12 months     Recent Labs   Lab Test 05/13/19  1525   CR 1.14              Passed - Normal serum potassium on file in past 12 months     Recent Labs   Lab Test 05/13/19  1525   POTASSIUM 4.0                    Passed - Normal serum sodium on file in past 12 months    "  Recent Labs   Lab Test 05/13/19  1525                    rifaximin (XIFAXAN) 550 MG TABS tablet        Last Written Prescription Date:  na  Last Fill Quantity: na,   # refills: na  Last Office Visit: 03/13/2020  Future Office visit:    Next 5 appointments (look out 90 days)    Apr 14, 2020  4:40 PM CDT  Telephone Visit with Frank Childs MD  [facility] [address] [place] [phone]           Routing refill request to provider for review/approval because:  Drug not on the [facility], [facility] or  Health refill protocol or controlled substance  Historical 180 tablet      Sig: Take 1 tablet (550 mg) by mouth 2 times daily       There is no refill protocol information for this order

## 2020-04-07 ENCOUNTER — TELEPHONE (OUTPATIENT)
Dept: FAMILY MEDICINE | Facility: CLINIC | Age: 55
End: 2020-04-07

## 2020-04-07 NOTE — PROGRESS NOTES
Clinic Care Coordination Contact  Care Team Conversations    RN CC spoke with Tiana from [facility] [phone].  Patient will open to home care services 4/8/20.  RN Case Manager will be Sonja Dent [phone].  RN CC left a voicemail for Sonja with writer's contact information.    Melissa Behl BSN, RN, PHN, [facility]  Primary Care Clinical RN Care Coordinator  [facility]   [phone]

## 2020-04-07 NOTE — TELEPHONE ENCOUNTER
This writer attempted to contact Patient on 04/07/20      Reason for call message below and unable to leave message.      If patient calls back:   1st floor [facility] Care Team (MA/TC) called. Inform patient that someone from the team will contact them, document that pt called and route to care team.         Maty Martinez

## 2020-04-07 NOTE — TELEPHONE ENCOUNTER
This writer attempted to contact Patient on 04/07/20      Reason for call Message below and unable to leave message.      If patient calls back:   1st floor [facility] Care Team (MA/TC) called. Inform patient that someone from the team will contact them, document that pt called and route to care team.         Maty Martinez

## 2020-04-07 NOTE — TELEPHONE ENCOUNTER
Verbal Orders    Skilled nursing, PT/ OT , home healthcare- EVAL and TREAT for 0408/2020      Line secure

## 2020-04-07 NOTE — TELEPHONE ENCOUNTER
OK to approve with verbal order for home care since this was recommended by [facility]. He needs a follow up phone visit scheduled either this week with one of the virtual providers or next week with Dr. Childs.   Jael Soliman MD

## 2020-04-08 NOTE — TELEPHONE ENCOUNTER
Patient informed by phone of RN's message. Patient states he will check with his pharmacy again.    Britt Meyer MA

## 2020-04-08 NOTE — TELEPHONE ENCOUNTER
Tried calling the phone number 2 x and Man answers the phone and states that there is no Mandy at this phone number and not [facility].      This writer attempted to contact Mandy on 04/08/20      Reason for call orders and left message.      If patient calls back:   Registered Nurse called. Follow Triage Call workflow        Siena Varner RN

## 2020-04-09 ENCOUNTER — ALLIED HEALTH/NURSE VISIT (OUTPATIENT)
Dept: PHARMACY | Facility: CLINIC | Age: 55
End: 2020-04-09
Payer: COMMERCIAL

## 2020-04-09 DIAGNOSIS — R52 PAIN: Primary | ICD-10-CM

## 2020-04-09 DIAGNOSIS — I50.22 CHRONIC SYSTOLIC CONGESTIVE HEART FAILURE (H): ICD-10-CM

## 2020-04-09 DIAGNOSIS — K70.31 ALCOHOLIC CIRRHOSIS OF LIVER WITH ASCITES (H): ICD-10-CM

## 2020-04-09 DIAGNOSIS — K26.9 DUODENAL ULCER WITHOUT HEMORRHAGE OR PERFORATION AND WITHOUT OBSTRUCTION: ICD-10-CM

## 2020-04-09 PROCEDURE — 99605 MTMS BY PHARM NP 15 MIN: CPT | Performed by: PHARMACIST

## 2020-04-09 PROCEDURE — 99607 MTMS BY PHARM ADDL 15 MIN: CPT | Performed by: PHARMACIST

## 2020-04-09 RX ORDER — OXYCODONE HYDROCHLORIDE 5 MG/1
5 TABLET ORAL 3 TIMES DAILY PRN
OUTPATIENT
Start: 2020-04-09

## 2020-04-09 RX ORDER — SPIRONOLACTONE 50 MG/1
50 TABLET, FILM COATED ORAL DAILY
Qty: 30 TABLET | Refills: 5 | Status: SHIPPED | OUTPATIENT
Start: 2020-04-09

## 2020-04-09 RX ORDER — MULTIVITAMIN/IRON/FOLIC ACID 18MG-0.4MG
1 TABLET ORAL DAILY
Qty: 90 TABLET | Refills: 3 | Status: SHIPPED | OUTPATIENT
Start: 2020-04-09

## 2020-04-09 RX ORDER — FUROSEMIDE 40 MG
40 TABLET ORAL DAILY
Qty: 30 TABLET | Refills: 5 | Status: SHIPPED | OUTPATIENT
Start: 2020-04-09

## 2020-04-09 RX ORDER — TAMSULOSIN HYDROCHLORIDE 0.4 MG/1
0.4 CAPSULE ORAL DAILY
Qty: 90 CAPSULE | Refills: 1 | Status: SHIPPED | OUTPATIENT
Start: 2020-04-09

## 2020-04-09 RX ORDER — PANTOPRAZOLE SODIUM 40 MG/1
40 TABLET, DELAYED RELEASE ORAL DAILY
Qty: 90 TABLET | Refills: 1 | Status: SHIPPED | OUTPATIENT
Start: 2020-04-09

## 2020-04-09 NOTE — TELEPHONE ENCOUNTER
"Took call back from RN line.    Spoke with patient's mom, Vannesa.    Confirmed that patient contact information is the mobile line listed in chart, [phone]. Mom did confirm address on file is for patient as well. No CTC on file to speak with mom. Only discussed that staff are attempting to contact patient and have not been successful, wanted to make sure had correct number on file.    After further review of patient chart and recent encounters, did locate a RNCC outreach call from 4/6/20 where that RN documented that home care company with patient is [facility] and listed a contact number of [phone].   Writer attempted call to this number, was identified as \"Altagracia\" with [facility] in voicemail message. Instructions given to call [phone] as main office number (which is same # given for \"Altagracia\".) Was unable to leave any return call message, mailbox was full.  Writer did look on [employer] for [facility] company and [phone] is listed as main contact number on website.    Appears message taker of incoming original call may have typed in incorrect call back number for team ([phone])    Team to attempt call again to [phone] tomorrow, when likely to be open.    Helen Kahn RN  [facility]      "

## 2020-04-09 NOTE — TELEPHONE ENCOUNTER
This writer attempted to contact patient on 04/09/20      Reason for call verbal orders and left generic message on mother cell phone. Patients contact info is not current.       If patient calls back:   Registered Nurse called. Follow Triage Call workflow        Neris Camacho RN

## 2020-04-09 NOTE — Clinical Note
EJ GOMEZ note, thanks!    Klaudia Humphreys, PharmD  Medication Therapy Management Pharmacist  [phone]

## 2020-04-09 NOTE — PATIENT INSTRUCTIONS
Recommendations from today's MTM visit:                                                    MTM (medication therapy management) is a service provided by a clinical pharmacist designed to help you get the most of out of your medicines.   Today we reviewed what your medicines are for, how to know if they are working, that your medicines are safe and how to make your medicine regimen as easy as possible.     1. Attached is an updated medication list. Please take these every day and use a pill box to set them up - that way you don't have to open so many bottles every day!     It was great to speak with you today.  I value your experience and would be very thankful for your time with providing feedback on our clinic survey. You may receive a survey via email or text message in the next few days.     Next MTM visit: 1 month    To schedule another MTM appointment, please call the clinic directly or you may call the MTM scheduling line at [phone] or toll-free at [phone].     My Clinical Pharmacist's contact information:                                                      It was a pleasure talking with you today!  Please feel free to contact me with any questions or concerns you have.      Klaudia Humphreys, PharmD  Medication Therapy Management Pharmacist  [phone]

## 2020-04-09 NOTE — PROGRESS NOTES
MTM ENCOUNTER  SUBJECTIVE/OBJECTIVE:                           Marlon Mackey is a 54 year old male called for a transitions of care visit. He was discharged from [facility] on 4/3 for hernia surgery.      Patient consented to a telehealth visit: yes    Chief Complaint: none, medication review.    Allergies/ADRs: Reviewed in Epic  Tobacco:  reports that he has been smoking. He has been smoking about 1.00 pack per day. He has never used smokeless tobacco.Tobacco Cessation Action Plan: Information offered: Patient not interested at this time  Alcohol: 4-6 beverages / week, he drinks socially, does not think he drinks too much  Caffeine: no caffeine  Activity: minimal  PMH: Reviewed in Epic    Medication Adherence/Access:   Patient takes medications directly from bottles. His kids bought him a pill box, but he doesn't like it.  Patient takes medications 2 time(s) per day.   Per patient, misses medication 5-6 times per week.   The patient fills medications at [facility]: NO, fills medications at [facility].    [facility] has several prescriptions ready for , he hasn't gotten them yet, doesn't like taking pills.     Pain:    Hasn't gotten oxycodone 5mg prescription yet. Got frustrated so hasn't pursued this. He states he's still in pain though.        Cirrhosis of the liver:  .  Not taking Lactulose 30 mL (20 g) twice daily, Take to have 3 bowel movements a day  Not taking rifaXIMIN 550 mg twice daily  Not taking MIV daily    He doesn't think it's related to alcohol use.     GERD/PUD:   Not taking Protonix (pantoprazole) 40 mg daily    HFrEF:  Not taking Furosemide 40 mg daily   Not taking potassium chloride ER 20 mg twice daily   Not taking spironolactone 50 mg daily     Carvedilol and ramipril were stopped at discharge.   ECHO:  Date 12/11/17, EF < 10%    Prostate:    Not taking tamsulosin 0.4 mg daily.    Today's Vitals: There were no vitals taken for this visit. phone visit      ASSESSMENT:                               Medication Adherence: poor, see plan    Pain: Needs improvement. Options limited d/t liver disease and CHF. Short course of oxycodone preferred over continued fills d/t CNS depression with concomitant alcohol.    Cirrhosis of the liver:  Needs improvement. Pt would benefit from taking medications as prescribed.     GERD/PUD: Needs improvement. Pt would benefit from taking medications as prescribed.    HFrEF:Needs improvement. Pt would benefit from taking medications as prescribed.    Prostate:  Needs improvement. Pt would benefit from taking medications as prescribed.    PLAN:                          Post Discharge Medication Reconciliation Status: discharge medications reconciled, continue medications without change.    Pt plans to get prescriptions filled, I encouraged he ask his kids to pick them up for him.      Patient:    1. Attached is an updated medication list. Please take these every day and use a pill box to set them up - that way you don't have to open so many bottles every day!      I spent 30 minutes with this patient today. All changes were made via collaborative practice agreement with Frank Childs. A copy of the visit note was provided to the patient's primary care provider.    Will follow up in 1 month.    The patient was mailed a summary of these recommendations.     Klaudia Humphreys, PharmD  Medication Therapy Management Pharmacist  [phone]

## 2020-04-10 ENCOUNTER — PATIENT OUTREACH (OUTPATIENT)
Dept: CARE COORDINATION | Facility: CLINIC | Age: 55
End: 2020-04-10

## 2020-04-10 NOTE — TELEPHONE ENCOUNTER
Spoke to Mandy on the phone. Reviewed approval for home care orders with her. She will let patient know to scheduled for a visit.  Siena Varner RN

## 2020-04-10 NOTE — PROGRESS NOTES
"Clinic Care Coordination Contact    Follow Up Progress Note      Assessment: Patient reports new draining from his umbilicus since hospital discharge, last occurrence was 1.5 days ago.  Patient reporting copious amounts of clear/yellow drainage needing to use towels to soak of the liquid.  Patient denies redness, fever or chills.    Patient admits to only taking 1 dose of his medications since hospital discharge, \"I really think they make my gut rot worse.\"  RN CC reviewed importance of medication adherence, rationale of each medication and importance of not stopping a medication without first consulting a provider.  It is uncertain of patient will adhere to this advice.  Patient was agreeable to putting a call into his surgeon due to drainage for his umbilicus.  RN CC placed a conference call to [facility] [phone] and patient provided an update of his symptoms.  Patient was informed the on-call surgeon will return patient's call.  RN CC contacted [facility] RN Case Manager Sonja Dent [phone] and requested a call back with start of care plans for patient.  FYI to MTM and PCP.    Goals addressed this encounter:   Goals Addressed                 This Visit's Progress      1. Improve chronic symptoms (pt-stated)   10%     Goal Statement: I want to understand my liver disease.  Date Goal set: 2/5/2020   Barriers: complex diagnosis  Strengths: care coordination  Date to Achieve By: 7/5/20  Patient expressed understanding of goal: yes  Action steps to achieve this goal:  1. I will have a liver ultrasound April 6th at [facility].  2. I will seek emergency care 3/30/20 (completed)  3. I will follow up with Dr. Childs 4/14/20  4. I will contact my surgery provider (completed 4/10/20)           2. Taking Medication   10%     Goal Statement: I will take my medications as directed.  Date Goal set: 3/27/2020   Barriers: not feeling well  Strengths: care coordination  Date to " Achieve By: 5/27/20  Patient expressed understanding of goal: yes  Action steps to achieve this goal:  1. I will take my medications every day as directed.  2. I will  my prescriptions from the pharmacy.               Intervention/Education provided during outreach: RN CC reviewed importance of medication adherence and rationale for patient's medications, RN CC assisted patient in contacting patient's surgeon due to new symptoms since discharge, RN CC left a message for home care RN to inquire about start of care plans.     Outreach Frequency: weekly    Plan:   1. Patient will await call back from surgeon.  2. Patient will continue to work on medication adherence.  3. Patient will attend virtual visit with PCP 4/14/20.  4. RN Care Coordinator will follow up in 1 week.    Melissa Behl BSN, RN, PHN, CCM  Primary Care Clinical RN Care Coordinator  [facility]   [phone]

## 2020-04-10 NOTE — PROGRESS NOTES
Not sure what to have patient do at this point. Compliance is an issue and he needs home care to check and manage the wound until seen by his surgeon in 1-2 weeks. He will need to come into urgent care if it is looking worse.  Jael Soliman MD

## 2020-04-10 NOTE — PROGRESS NOTES
Clinic Care Coordination Contact  [facility]/Voicemail       Clinical Data: Care Coordinator Outreach  Outreach attempted x 1.  No answer and no voicemail available.  Plan: Care Coordinator will try to reach patient again in 1-2 business days.    Melissa Behl BSN, RN, PHN, [facility]  Primary Care Clinical RN Care Coordinator  [facility]   [phone]

## 2020-04-13 ENCOUNTER — TELEPHONE (OUTPATIENT)
Dept: FAMILY MEDICINE | Facility: CLINIC | Age: 55
End: 2020-04-13

## 2020-04-13 NOTE — PROGRESS NOTES
Clinic Care Coordination Contact  [facility]/Voicemail       Clinical Data: Care Coordinator Outreach  Outreach attempted x 1.  No answer and voicemail is full.  Plan: Care Coordinator will try to reach patient again in 3-5 business days.    Care Team Conversations    RN CC contacted [facility] [phone] to determine start of care date for patient.  RN CC was informed the home care nurse went out on 4/8/20 and the patient informed the nurse he was not ready.    Writer had left a message for the RN Case Manager Sonja Dent on 4/10/20.  Will await call back.    Melissa Behl BSN, RN, PHN, CCM  Primary Care Clinical RN Care Coordinator  [facility]   [phone]

## 2020-04-13 NOTE — TELEPHONE ENCOUNTER
Reason for Call:  Home Health Care    Tiana - LPN with [facility] called regarding (reason for call): orders from April 3.    Please discuss the home care orders at the Telephone visit on Tuesday, April 14 with patient.     Orders are needed for this patient.    PT:    OT:      Skilled Nursing:    Pt Provider:    Phone Number Homecare Nurse can be reached at: [phone]    Can we leave a detailed message on this number? YES    Phone number patient can be reached at:    Best Time:    Call taken on 4/13/2020 at 4:33 PM by Meena Ambrocio

## 2020-04-13 NOTE — PROGRESS NOTES
Clinic Care Coordination Contact  [facility]/Voicemail       Clinical Data: Care Coordinator Outreach  Patient left a voicemail for writer at 10:57 am.  Patient stated he would be taking his mother to the clinic.  Outreach attempted x 1. No answer and voicemail box is full.  Plan: Care Coordinator will try to reach patient again in 3-5 business days.    Melissa Behl BSN, RN, PHN, CCM  Primary Care Clinical RN Care Coordinator  [facility]   [phone]

## 2020-04-14 ENCOUNTER — TELEPHONE (OUTPATIENT)
Dept: FAMILY MEDICINE | Facility: CLINIC | Age: 55
End: 2020-04-14

## 2020-04-14 ENCOUNTER — VIRTUAL VISIT (OUTPATIENT)
Dept: FAMILY MEDICINE | Facility: CLINIC | Age: 55
End: 2020-04-14
Payer: COMMERCIAL

## 2020-04-14 DIAGNOSIS — Z87.19 S/P HERNIA REPAIR: ICD-10-CM

## 2020-04-14 DIAGNOSIS — Z91.148 NON COMPLIANCE W MEDICATION REGIMEN: ICD-10-CM

## 2020-04-14 DIAGNOSIS — Z98.890 S/P HERNIA REPAIR: ICD-10-CM

## 2020-04-14 DIAGNOSIS — K70.31 ALCOHOLIC CIRRHOSIS OF LIVER WITH ASCITES (H): Primary | ICD-10-CM

## 2020-04-14 DIAGNOSIS — Z87.19 HX OF UMBILICAL HERNIA REPAIR: ICD-10-CM

## 2020-04-14 DIAGNOSIS — Z98.890 HX OF UMBILICAL HERNIA REPAIR: ICD-10-CM

## 2020-04-14 DIAGNOSIS — K76.82 HEPATIC ENCEPHALOPATHY (H): ICD-10-CM

## 2020-04-14 PROCEDURE — 99442 ZZC PHYSICIAN TELEPHONE EVALUATION 11-20 MIN: CPT | Performed by: INTERNAL MEDICINE

## 2020-04-14 NOTE — TELEPHONE ENCOUNTER
Reason for Call:  Other call back    Detailed comments: PT was under the impression that a home care nurse would come visit today. Needs clarification on the plan of action.    Phone Number Patient can be reached at: Cell number on file:    Telephone Information:   Mobile [phone]       Best Time: Anytime.    Can we leave a detailed message on this number? YES    Call taken on 4/14/2020 at 5:59 PM by Mirna Patel

## 2020-04-14 NOTE — PROGRESS NOTES
"Maroln Mackey is a 54 year old male who is being evaluated via a billable telephone visit.      The patient has been notified of following:     \"This telephone visit will be conducted via a call between you and your physician/provider. We have found that certain health care needs can be provided without the need for a physical exam.  This service lets us provide the care you need with a short phone conversation.  If a prescription is necessary we can send it directly to your pharmacy.  If lab work is needed we can place an order for that and you can then stop by our lab to have the test done at a later time.    Telephone visits are billed at different rates depending on your insurance coverage. During this emergency period, for some insurers they may be billed the same as an in-person visit.  Please reach out to your insurance provider with any questions.    If during the course of the call the physician/provider feels a telephone visit is not appropriate, you will not be charged for this service.\"    Patient has given verbal consent for Telephone visit?  Yes    How would you like to obtain your AVS? Mail a copy    Subjective     Marlon Mackey is a 54 year old male who presents to clinic today for the following health issues:    \"I still got this gut ache. I've been sleeping all the time.  Belly is not big but it hurts, in the middle of the belly (S/P umbilical hernia repair).  \"I eat a little bit. I could barely walk and stand up due to swelling of both legs.\"  Patient has alcoholic liver cirrhosis complicated by ascites.  \"I sleep all the time. I didn't know that Rifaximin is to help avoid sleepiness.\" Mr. Mackey also has hepatic encephalopathy, and non-compliant with Lactulose nor Rifaximin.      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)     Ulcer of esophagus without bleeding     Duodenal ulcer without hemorrhage or perforation and without obstruction     Alcoholic polyneuropathy (H)     Hx of umbilical hernia repair     Past Surgical History:   Procedure Laterality Date     ORTHOPEDIC SURGERY Right     arm       Social History     Tobacco Use     Smoking status: Current Every Day Smoker     Packs/day: 1.00     Smokeless tobacco: Never Used     Tobacco comment: it relaxes him when he is trying to sleep   Substance Use Topics     Alcohol use: Yes     Frequency: 2-4 times a month     Drinks per session: 1 or 2     Binge frequency: Less than monthly     Comment: helps him sleep     Family History   Problem Relation Age of Onset     Cancer Father      Family History Negative Other          No Known Allergies  Recent Labs   Lab Test 05/13/19  1525 04/04/19  1202 02/19/19  1216  06/07/18  1216 06/07/18  1207   LDL  --   --   --   --  73 69   HDL  --   --   --   --  51 53   TRIG  --   --   --   --  136 135   ALT 23 41 58   < >  --   --    CR 1.14 1.84*  --    < >  --  0.77   GFRESTIMATED 72 40*  --    < >  --  >90   GFRESTBLACK 84 47*  --    < >  --  >90   POTASSIUM 4.0 2.8*  --    < >  --  4.2    < > = values in this interval not displayed.      BP Readings from Last 3 Encounters:   03/13/20 92/59   03/11/20 (!) 89/51   02/14/20 122/79    Wt Readings from Last 3 Encounters:   03/13/20 87.1 kg (192 lb)   03/11/20 83.9 kg (185 lb)   02/14/20 86.7 kg (191 lb 3.2 oz)                    Reviewed and updated as needed this visit by Provider         Review of Systems   ROS COMP: CONSTITUTIONAL: NEGATIVE for fever, chills, change in weight  INTEGUMENTARY/SKIN: NEGATIVE for  worrisome rashes, moles or lesions  EYES: NEGATIVE for vision changes or irritation  ENT/MOUTH: NEGATIVE for ear, mouth and throat problems  RESP: NEGATIVE for significant cough or SOB  CV: NEGATIVE for chest pain, palpitations or peripheral edema  GI: NEGATIVE for hematemesis, hematochezia and melena  : NEGATIVE for frequency, dysuria, or hematuria  MUSCULOSKELETAL: NEGATIVE for significant arthralgias or myalgia  NEURO: NEGATIVE for weakness, dizziness or paresthesias  ENDOCRINE: NEGATIVE for temperature intolerance, skin/hair changes  HEME: NEGATIVE for bleeding problems  PSYCHIATRIC: NEGATIVE for changes in mood or affect       Objective   Reported vitals:  There were no vitals taken for this visit.     Remainder of exam unable to be completed due to telephone visits    Diagnostic Test Results:  Labs reviewed in Epic        Assessment/Plan:  1. Alcoholic cirrhosis of liver with ascites (H)  Complications include ascites, but noncompliant with either Furosemide or Spironolactone. His complaints of sleepiness is due to hepatic encephalopathy, but he does not take Rifaximin (instructed by this provider to take drug tonight and maintain it at 1 tab BID until seen by home care nurses from [facility]). No hepatorenal syndrome, but patient has low normal blood pressure (Carvedilol and Ramipril) after discharge from [facility] last 4/4/2020).     2. Hx of umbilical hernia repair  Patient still has postoperative pain. Avoid opiates due to concomitant mental status changes from hepatic encephalopathy.    Follow-up in 2 - 4 weeks.      Phone call duration:  18 minutes  Call started: 5:00 PM  Call ended: 5:18 PM    Frank Childs MD

## 2020-04-14 NOTE — TELEPHONE ENCOUNTER
I spoke with [facility] to approve home care services.    I also discussed the need for these services with the patient during his telephone visit today. Patient finally agreed to obtain such services when he realized that he cannot manage his medications.

## 2020-04-15 PROBLEM — Z98.890 HX OF UMBILICAL HERNIA REPAIR: Status: ACTIVE | Noted: 2020-04-15

## 2020-04-15 PROBLEM — Z87.19 HX OF UMBILICAL HERNIA REPAIR: Status: ACTIVE | Noted: 2020-04-15

## 2020-04-15 NOTE — TELEPHONE ENCOUNTER
This writer attempted to contact Marlon on 04/15/20      Reason for call provider message and unable to leave message.      If patient calls back:   Registered Nurse called. Follow Triage Call workflow        Siena Varner RN

## 2020-04-15 NOTE — TELEPHONE ENCOUNTER
That's false.    I informed patient that a home health nurse from [facility] WILL start home care services (after a verbal order was given by this provider to [facility] - refer to telephone encounter last 4/13/2020), NOT LITERALLY ON 4/14/2020 AFTER HIS TELEPHONE ENCOUNTER.    Please educate the patient about the home care process and that a home care nurse will not just stop one-time to help his medications.    Patient is well-known to be very non-compliant with his medications despite his alcoholic liver cirrhosis.

## 2020-04-16 NOTE — TELEPHONE ENCOUNTER
This writer attempted to contact patient on 04/16/20      Reason for call discuss provider note and unable to leave message.    Mailbox full    If patient calls back:   Registered Nurse called. Follow Triage Call workflow        Neris Camacho RN

## 2020-04-17 ENCOUNTER — PATIENT OUTREACH (OUTPATIENT)
Dept: CARE COORDINATION | Facility: CLINIC | Age: 55
End: 2020-04-17

## 2020-04-17 ASSESSMENT — ACTIVITIES OF DAILY LIVING (ADL): DEPENDENT_IADLS:: TRANSPORTATION;CLEANING;LAUNDRY

## 2020-04-17 NOTE — TELEPHONE ENCOUNTER
This writer attempted to contact patient on 04/17/20      Reason for call discuss provider note and left message.      If patient calls back:   Registered Nurse called. Follow Triage Call workflow        Neris Camacho RN

## 2020-04-17 NOTE — PROGRESS NOTES
Clinic Care Coordination Contact    Follow Up Progress Note      Assessment: RN CC spoke with [facility] who informed writer patient did not answer their calls, so home care orders were closed 4/16/20.    RN CC spoke with patient who states he has not started taking any medications.  RN CC explored with patient reasons for non-compliance.  Patient admitted to not liking to take multiple medications, but then stated some of the tablets get stuck in his throat and cause him to gag.  Patient informed writer he had not picked up furosemide yet and was uncertain what all mediations were for.  RN CC will notify PCP and MTM to explore potential alternatives for patient, such as liquid or powder potassium instead of tablets, changing tablets to capsules and clarifying with patient which medications can be crushed in applesauce.  RN CC advised patient to begin taking all medications ASAP and to try taking them in yogurt, applesauce or ice cream if he has difficulty swallowing them.  Patient agreeable to trying this weekend, but is appreciative of writer exploring other options with PCP and MTM.  Patient states he didn't realize he missed home care's calls.  Patient is open to having home care.  RN CC will inquire with PCP if [facility] can be ordered.  Patient continues to report significant BLE edema and abdominal bloating.  RN  CC reinforced importance of taking medications and advised patient to elevate legs when sitting.  Patient verbalized understanding.    Goals addressed this encounter:   Goals Addressed                 This Visit's Progress      1. Improve chronic symptoms (pt-stated)   20%     Goal Statement: I want to understand my liver disease.  Date Goal set: 2/5/2020   Barriers: complex diagnosis  Strengths: care coordination  Date to Achieve By: 7/5/20  Patient expressed understanding of goal: yes  Action steps to achieve this goal:  1. I will have a liver ultrasound April 6th at [facility]. (completed)  2. I will seek emergency care 3/30/20 (completed)  3. I will follow up with Dr. Childs 4/14/20(completed)  4. I will contact my surgery provider (completed 4/10/20)  5. I will have home care services to assist with disease and medication management.           2. Taking Medication   0%     Goal Statement: I will take my medications as directed.  Date Goal set: 3/27/2020   Barriers: not feeling well  Strengths: care coordination  Date to Achieve By: 5/27/20  Patient expressed understanding of goal: yes  Action steps to achieve this goal:  1. I will take my medications every day as directed.  2. I will  my prescriptions from the pharmacy.               Intervention/Education provided during outreach:   RN CC educated patient on importance of medication adherence, reviewed purpose of medications, advised patient to  all medications or have pharmacy deliver medications, advised patient to elevate legs while sitting, reviewed home care services and explored reasons for medication non-compliance.     Outreach Frequency: weekly    Plan:   1. Patient will contact [facility] and  or have furosemide prescription delivered.  2. RN CC will forward message to PCP and MTM regarding medication non-compliance and to explore possibilities of changing medications from tablets to capsules or powders/liquids due to difficulty swallowing tablets.  3. RN CC will send message to PCP to request new home care orders from [facility].  4. RN CC will follow up with patient in 1 week.  Melissa Behl BSN, RN, PHN, CCM  Primary Care Clinical RN Care Coordinator  [facility]   [phone]

## 2020-04-20 ENCOUNTER — DOCUMENTATION ONLY (OUTPATIENT)
Dept: CARE COORDINATION | Facility: CLINIC | Age: 55
End: 2020-04-20

## 2020-04-20 PROBLEM — K76.82 HEPATIC ENCEPHALOPATHY (H): Status: ACTIVE | Noted: 2020-04-20

## 2020-04-20 PROBLEM — Z91.148 NON COMPLIANCE W MEDICATION REGIMEN: Status: ACTIVE | Noted: 2020-04-20

## 2020-04-20 NOTE — PROGRESS NOTES
Attempted to call patient x2, no answer, voicemail full.    In regards to his concern about pill size, the potassium and the rifaximin might be the larger ones and would need to speak with him to get more information.    Will attempt again this week.    Klaudia Humphreys, PharmD  Medication Therapy Management Pharmacist  [phone]

## 2020-04-20 NOTE — PROGRESS NOTES
Dear Dr. Childs,    We are notifying you that Marlon Mackey; MRN [id number]  has declined home care assessment.     When contacted about home care, patient stated he thought about it and decided he doesn't want anybody at the house.  He said his daughter in law sets up his pills and he has promised to take them on time.    Writer reinforced with Mr. Mackey  to schedule follow up appointment and or call MD if there are any further concerns he may have or if he desires home care services in the future.    Thank you for the referral  Sincerely [facility]  Bella Price, RN  [phone]

## 2020-04-23 ENCOUNTER — PATIENT OUTREACH (OUTPATIENT)
Dept: CARE COORDINATION | Facility: CLINIC | Age: 55
End: 2020-04-23

## 2020-04-23 NOTE — PROGRESS NOTES
Clinic Care Coordination Contact  [facility]/Voicemail       Clinical Data: Care Coordinator Outreach  Outreach attempted x 1.  No answer and voicemail was full.  Plan:  Care Coordinator will try to reach patient again in 1-2 business days.    Melissa Behl BSN, RN, PHN, [facility]  Primary Care Clinical RN Care Coordinator  [facility]   [phone]

## 2020-04-24 ENCOUNTER — VIRTUAL VISIT (OUTPATIENT)
Dept: FAMILY MEDICINE | Facility: CLINIC | Age: 55
End: 2020-04-24
Payer: COMMERCIAL

## 2020-04-24 DIAGNOSIS — K70.31 ALCOHOLIC CIRRHOSIS OF LIVER WITH ASCITES (H): Primary | ICD-10-CM

## 2020-04-24 PROCEDURE — 99443 ZZC PHYSICIAN TELEPHONE EVALUATION 21-30 MIN: CPT | Performed by: FAMILY MEDICINE

## 2020-04-24 NOTE — PROGRESS NOTES
Clinic Care Coordination Contact  Care Team Conversations    RN CC spoke with patient.  Patient requested call next week, as he has a visit with a provider today.    Melissa Behl BSN, RN, PHN, CCM  Primary Care Clinical RN Care Coordinator  [facility]   [phone]

## 2020-04-24 NOTE — PROGRESS NOTES
"Marlon Mackey is a 54 year old male who is being evaluated via a billable telephone visit.      The patient has been notified of following:     \"This telephone visit will be conducted via a call between you and your physician/provider. We have found that certain health care needs can be provided without the need for a physical exam.  This service lets us provide the care you need with a short phone conversation.  If a prescription is necessary we can send it directly to your pharmacy.  If lab work is needed we can place an order for that and you can then stop by our lab to have the test done at a later time.    Telephone visits are billed at different rates depending on your insurance coverage. During this emergency period, for some insurers they may be billed the same as an in-person visit.  Please reach out to your insurance provider with any questions.    If during the course of the call the physician/provider feels a telephone visit is not appropriate, you will not be charged for this service.\"    Patient has given verbal consent for Telephone visit?  Yes    How would you like to obtain your AVS? Mail a copy    Subjective     Marlon Mackey is a 54 year old male who presents to clinic today for the following health issues:    HPI    Patient with history of alcoholic cirrhosis with ascites c/o abdominal fullness and bilateral ankle swelling. Patient stated his last drink was \"2-3 weeks ago with friends.\" Patient was recently hospitalized for acute alcohol intoxication 3 days ago. Patient wondering if he can get some pain medication to help him with the abdominal fullness he is experiencing. He is eating and drinking okay. No fever. No cp/sob. Patient stated he does not take his medication everyday. 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)     Ulcer of esophagus without bleeding     Duodenal ulcer without hemorrhage or perforation and without obstruction     Alcoholic polyneuropathy (H)     Hx of umbilical hernia repair     Hepatic encephalopathy (H)     Non compliance w medication regimen     Past Surgical History:   Procedure Laterality Date     ORTHOPEDIC SURGERY Right     arm       Social History     Tobacco Use     Smoking status: Current Every Day Smoker     Packs/day: 1.00     Smokeless tobacco: Never Used     Tobacco comment: it relaxes him when he is trying to sleep   Substance Use Topics     Alcohol use: Yes     Frequency: 2-4 times a month     Drinks per session: 1 or 2     Binge frequency: Less than monthly     Comment: helps him sleep     Family History   Problem Relation Age of Onset     Cancer Father      Family History Negative Other          Current Outpatient Medications   Medication Sig Dispense Refill     bismuth subsalicylate (PEPTO BISMOL) 262 MG/15ML suspension Take 15 mLs by mouth every 6 hours as needed for indigestion 180 mL 5     furosemide (LASIX) 40 MG tablet Take 1 tablet (40 mg) by mouth daily 30 tablet 5     lactulose (CHRONULAC) 10 GM/15ML solution Take 20 g by mouth 2 times daily Take to have 3 bowel movements a day       Multiple Vitamins-Minerals (CENTROVITE) TABS Take 1 tablet by mouth daily 90 tablet 3     order for DME Equipment being ordered: Power wheelchair. Fax to [facility] [place]> 1 each 0     pantoprazole (PROTONIX) 40 MG EC tablet Take 1 tablet (40 mg) by mouth daily 90 tablet 1     potassium chloride ER (K-DUR/KLOR-CON M) 20 MEQ CR tablet TAKE 1  TABLET(20 MEQ) BY MOUTH TWICE DAILY 60 tablet 0     rifaximin (XIFAXAN) 550 MG TABS tablet Take 1 tablet (550 mg) by mouth 2 times daily 180 tablet 0     spironolactone (ALDACTONE) 50 MG tablet Take 1 tablet (50 mg) by mouth daily Take with Furosemide. 30 tablet 5     tamsulosin (FLOMAX) 0.4 MG capsule Take 1 capsule (0.4 mg) by mouth daily 90 capsule 1     No Known Allergies  BP Readings from Last 3 Encounters:   03/13/20 92/59   03/11/20 (!) 89/51   02/14/20 122/79    Wt Readings from Last 3 Encounters:   03/13/20 87.1 kg (192 lb)   03/11/20 83.9 kg (185 lb)   02/14/20 86.7 kg (191 lb 3.2 oz)                    Reviewed and updated as needed this visit by Provider         Review of Systems   ROS COMP: Constitutional, HEENT, cardiovascular, pulmonary, GI, , musculoskeletal, neuro, skin, endocrine and psych systems are negative, except as otherwise noted.       Objective   Reported vitals:  There were no vitals taken for this visit.   healthy, alert and no distress  PSYCH: Alert and oriented times 3; coherent speech, normal   rate and volume, able to articulate logical thoughts, able   to abstract reason, no tangential thoughts, no hallucinations   or delusions  His affect is normal  RESP: No cough, no audible wheezing, able to talk in full sentences  Remainder of exam unable to be completed due to telephone visits    Diagnostic Test Results:  Labs reviewed in Epic        Assessment/Plan:  1. Alcoholic cirrhosis of liver with ascites (H)  Advanced. Discussed would stay away from pain medications at this time due to liver issues. If really need to, hydromorphone or fentanyl low dose would be safe for this patient. Did not give anything for patient today. Patient has follow up with [facility] next week. He will likely benefit from a therapeutic paracentesis regularly. Discussed taking current medications to help with the swelling and ascites.      Return in about 1 week (around 5/1/2020) for with liver specialist,  [facility]..      Phone call duration:  21 minutes    Kaveh Quintanilla MD, MD

## 2020-04-29 ENCOUNTER — TRANSFERRED RECORDS (OUTPATIENT)
Dept: HEALTH INFORMATION MANAGEMENT | Facility: CLINIC | Age: 55
End: 2020-04-29

## 2020-04-30 NOTE — PROGRESS NOTES
Clinic Care Coordination Contact  [facility]/Voicemail       Clinical Data: Care Coordinator Outreach  Outreach attempted x 3.  No answer and no voicemail available.  Plan:  Care Coordinator will try to reach patient again in 5-10 business days.    Melissa Behl BSN, RN, PHN, [facility]  Primary Care Clinical RN Care Coordinator  [facility]   [phone]

## 2020-05-07 ENCOUNTER — PATIENT OUTREACH (OUTPATIENT)
Dept: CARE COORDINATION | Facility: CLINIC | Age: 55
End: 2020-05-07

## 2020-05-07 NOTE — LETTER
[facility] CARE COORDINATION  [address] [address]   May 7, 2020    Marlon Mackey  [address]  [place] [address]      Dear Marlon,    I have been unsuccessful in reaching you since our last contact. At this time the Care Coordination team will make no further attempts to reach you, however this does not change your ability to continue receiving care from your providers at your primary care clinic. If you need additional support from a care coordinator in the future please contact me at [phone].    All of us at [facility] are invested in your health and are here to assist you in meeting your goals.     Sincerely,    Melissa Behl BSN, RN, PHN, [facility]  Primary Care Clinical RN Care Coordinator  [facility]   [phone]

## 2020-05-07 NOTE — PROGRESS NOTES
Clinic Care Coordination Contact  [facility]/Voicemail       Clinical Data: Care Coordinator Outreach  Outreach attempted x 4.  No answer and no voicemail.  Plan: Care Coordinator will send disenrollment letter with care coordinator contact information via mail. Care Coordinator will do no further outreaches at this time.  FYI to PCP.    Melissa Behl BSN, RN, PHN, CCM  Primary Care Clinical RN Care Coordinator  [facility]   [phone]

## 2020-05-20 ENCOUNTER — TRANSFERRED RECORDS (OUTPATIENT)
Dept: HEALTH INFORMATION MANAGEMENT | Facility: CLINIC | Age: 55
End: 2020-05-20

## 2020-05-20 LAB
ALT SERPL-CCNC: 37 U/L (ref 0–78)
AST SERPL-CCNC: 170 U/L (ref 0–37)
CREAT SERPL-MCNC: 1.2 MG/DL (ref 0.7–1.3)
GLUCOSE SERPL-MCNC: 88 MG/DL (ref 74–100)
POTASSIUM SERPL-SCNC: 3.2 MMOL/L (ref 3.5–5.1)

## 2020-08-07 ENCOUNTER — TELEPHONE (OUTPATIENT)
Dept: PHARMACY | Facility: CLINIC | Age: 55
End: 2020-08-07

## 2020-08-07 NOTE — TELEPHONE ENCOUNTER
We have been unable to reach this patient for MTM follow-up after several attempts. We will stop reaching out to the patient at this time. Please let us know if we can assist in this patient's care in the future.     Routing to PCP as VIMAL Humphreys, PharmD  Medication Therapy Management Pharmacist  [phone]

## 2022-11-28 NOTE — PROGRESS NOTES
Clinic Care Coordination Contact    Situation: Patient chart reviewed by care coordinator.    Background: RN CC reviewing chart for ED follow up.    Assessment: Patient will be seen by primary care within 1 hour.    Plan/Recommendations: RN CC will follow up with patient in 1-2 business days.    Melissa Behl BSN, RN, PHN  Primary Care Clinical RN Care Coordinator  [facility]   [phone]        The patient has been re-examined and I agree with the above assessment or I updated with my findings. moderate

## 2024-07-25 NOTE — TELEPHONE ENCOUNTER
- Continue taking baclofen    You received Botox today for your diagnosis of   Spasticity     To prevent infection, do not take a bath or go in a pool today, you may shower though.  Call me if you experience any swelling, redness, fever, worsening pain, difficulty breathing, worsening weakness.  Go to the ER if you experience difficulty breathing, difficulty swallowing, chest pain, rash.        The goal of this medication is to relax the muscles so that your therapy/home exercise program is more effective and you are able to improve and maintain your overall function. Therapy/home exercise program are KEY in helping improve your function and without these, your function is much less likely to improve.     The Botox does not work right away - it takes generally 2-10 days to see an effect. The peak effect is in 4-6 weeks. The medication usually lasts about 2-3 months.     Things the Botox cannot do:   - it cannot add strength  - it cannot improve function by itself but it makes doing therapy and exercises easier  - while it can help with pain if tight muscles are painful, it does not help with every reason you might have pain.     If you experience the following, please let us know by calling [phone]:  - new muscle weakness in the area injected  - new muscle weakness distant to the area injected  - difficulty swallowing    Go to the ER and call us at [phone] if you experience any:  - shortness of breath  - chest pain  - hives    Some people do notice pain at the injection sites after the injections. Ice, heat, and Tylenol can be helpful for this.     To help us better care for you, please fill out the following weekly journal:     Week How do you feel overall as compared to right before the injection? How is your range of motion as compared to right before the injection?   (-5 is much worse, 0 is no change, +5 is much better)         Week 1   -5----------0----------+5    Week  Reason for Call: Request for an order or referral:    Order or referral being requested: Referral    Date needed: as soon as possible    Has the patient been seen by the PCP for this problem? YES    Additional comments: Pt calling  for he would like for Marlon Coy to fax a referral to [facility] to fax # [phone] for his stomach concerns.  He would like it faxed as soon as possible today.  Pt said he mentioned he needed this sent at his visit today and would like a call back to confirm Referral has been faxed so he can make an appointment there.       Phone number Patient can be reached at:  Cell number on file:    Telephone Information:   Mobile [phone]       Best Time:  anytime    Can we leave a detailed message on this number?  YES    Call taken on 6/26/2019 at 2:00 PM by Jose Rafael Browne           2  -5----------0----------+5     Week 3  -5----------0----------+5     Week 4  -5----------0----------+5      Week 5  -5----------0----------+5     Week 6  -5----------0----------+5     Week 7  -5----------0----------+5     Week 8  -5----------0----------+5      Week 9  -5----------0----------+5       Week 10  -5----------0----------+5     Week 11  -5----------0----------+5    Week 12  -5----------0----------+5